# Patient Record
Sex: MALE | Race: WHITE | NOT HISPANIC OR LATINO | Employment: OTHER | URBAN - METROPOLITAN AREA
[De-identification: names, ages, dates, MRNs, and addresses within clinical notes are randomized per-mention and may not be internally consistent; named-entity substitution may affect disease eponyms.]

---

## 2017-04-24 ENCOUNTER — GENERIC CONVERSION - ENCOUNTER (OUTPATIENT)
Dept: OTHER | Facility: OTHER | Age: 82
End: 2017-04-24

## 2017-05-26 ENCOUNTER — GENERIC CONVERSION - ENCOUNTER (OUTPATIENT)
Dept: OTHER | Facility: OTHER | Age: 82
End: 2017-05-26

## 2017-06-29 ENCOUNTER — GENERIC CONVERSION - ENCOUNTER (OUTPATIENT)
Dept: OTHER | Facility: OTHER | Age: 82
End: 2017-06-29

## 2017-07-20 ENCOUNTER — ALLSCRIPTS OFFICE VISIT (OUTPATIENT)
Dept: OTHER | Facility: OTHER | Age: 82
End: 2017-07-20

## 2017-08-29 ENCOUNTER — GENERIC CONVERSION - ENCOUNTER (OUTPATIENT)
Dept: OTHER | Facility: OTHER | Age: 82
End: 2017-08-29

## 2017-12-29 ENCOUNTER — GENERIC CONVERSION - ENCOUNTER (OUTPATIENT)
Dept: FAMILY MEDICINE CLINIC | Facility: CLINIC | Age: 82
End: 2017-12-29

## 2018-01-13 VITALS
HEART RATE: 60 BPM | WEIGHT: 184 LBS | SYSTOLIC BLOOD PRESSURE: 140 MMHG | BODY MASS INDEX: 29.57 KG/M2 | DIASTOLIC BLOOD PRESSURE: 80 MMHG | HEIGHT: 66 IN

## 2018-02-01 DIAGNOSIS — I25.10 DISEASE OF CARDIOVASCULAR SYSTEM: ICD-10-CM

## 2018-02-01 DIAGNOSIS — I42.9 CARDIOMYOPATHY, UNSPECIFIED TYPE (HCC): Primary | ICD-10-CM

## 2018-04-20 DIAGNOSIS — Z95.0 PACEMAKER: Primary | ICD-10-CM

## 2018-05-31 DIAGNOSIS — Z13.89 SCREENING FOR SKIN CONDITION: Primary | ICD-10-CM

## 2018-07-11 ENCOUNTER — OFFICE VISIT (OUTPATIENT)
Dept: FAMILY MEDICINE CLINIC | Facility: CLINIC | Age: 83
End: 2018-07-11
Payer: COMMERCIAL

## 2018-07-11 VITALS
HEIGHT: 66 IN | WEIGHT: 184 LBS | HEART RATE: 60 BPM | SYSTOLIC BLOOD PRESSURE: 138 MMHG | BODY MASS INDEX: 29.57 KG/M2 | RESPIRATION RATE: 14 BRPM | DIASTOLIC BLOOD PRESSURE: 78 MMHG

## 2018-07-11 DIAGNOSIS — I10 ESSENTIAL HYPERTENSION: ICD-10-CM

## 2018-07-11 DIAGNOSIS — T14.8XXA HEMATOMA: Primary | ICD-10-CM

## 2018-07-11 DIAGNOSIS — H60.541 ECZEMA OF RIGHT EXTERNAL EAR: ICD-10-CM

## 2018-07-11 PROBLEM — H61.23 BILATERAL IMPACTED CERUMEN: Status: ACTIVE | Noted: 2017-07-20

## 2018-07-11 PROBLEM — I44.30 AV BLOCK: Status: ACTIVE | Noted: 2017-10-04

## 2018-07-11 PROBLEM — R31.29 OTHER MICROSCOPIC HEMATURIA: Status: ACTIVE | Noted: 2017-08-23

## 2018-07-11 PROCEDURE — 99214 OFFICE O/P EST MOD 30 MIN: CPT | Performed by: FAMILY MEDICINE

## 2018-07-11 PROCEDURE — 3078F DIAST BP <80 MM HG: CPT | Performed by: FAMILY MEDICINE

## 2018-07-11 PROCEDURE — 3075F SYST BP GE 130 - 139MM HG: CPT | Performed by: FAMILY MEDICINE

## 2018-07-11 RX ORDER — DOXAZOSIN 8 MG/1
1 TABLET ORAL DAILY
COMMUNITY

## 2018-07-11 RX ORDER — METOPROLOL SUCCINATE 50 MG/1
2 TABLET, EXTENDED RELEASE ORAL DAILY
COMMUNITY

## 2018-07-11 RX ORDER — DABIGATRAN ETEXILATE 150 MG/1
2 CAPSULE, COATED PELLETS ORAL DAILY
COMMUNITY
End: 2019-12-12 | Stop reason: ALTCHOICE

## 2018-07-11 RX ORDER — LOSARTAN POTASSIUM 50 MG/1
1 TABLET ORAL DAILY
COMMUNITY

## 2018-07-11 RX ORDER — SIMVASTATIN 20 MG
TABLET ORAL
COMMUNITY

## 2018-07-11 NOTE — PROGRESS NOTES
Assessment/Plan:    Diagnoses and all orders for this visit:    Hematoma    Eczema of right external ear  -     neomycin-polymyxin-hydrocortisone (CORTISPORIN) otic solution; 2 drops in R ear once or twice a day as needed    Essential hypertension  No treatment for hematoma  Will CT area if worsens or becomes symptomatic  Cortisporin otic for ear  Continue other meds other conditions are stable            Subjective:     Chief Complaint   Patient presents with    Mass     patient states that there is a lump on his head for past 1 month  states he did hit is head in that area prior to the lump  Patient ID: Leonel Clifton is a 80 y o  male  Patient hit head on underside of Deck   By accident  Had a lump that formed  This was 6 weeks ago and still has lump would like looked at  Also complaining of right ear itching he  He was on medicine in the past but does not remember what it is for this  Otherwise all other conditions are stable he has no other acute complaints today        The following portions of the patient's history were reviewed and updated as appropriate: allergies, current medications, past family history, past medical history, past social history, past surgical history and problem list     Review of Systems   Constitutional: Negative  HENT: Negative  Eyes: Negative  Respiratory: Negative  Cardiovascular: Negative  Gastrointestinal: Negative  Endocrine: Negative  Genitourinary: Negative  Musculoskeletal: Negative  Skin: Negative  Allergic/Immunologic: Negative  Neurological: Negative  Hematological: Negative  Psychiatric/Behavioral: Negative  All other systems reviewed and are negative          Objective:    Vitals:    07/11/18 1527   BP: 138/78   BP Location: Left arm   Patient Position: Sitting   Cuff Size: Standard   Pulse: 60   Resp: 14   Weight: 83 5 kg (184 lb)   Height: 5' 6" (1 676 m)          Physical Exam   Constitutional: He is oriented to person, place, and time  He appears well-developed and well-nourished  No distress  HENT:   Head: Normocephalic  Right Ear: External ear normal    Left Ear: External ear normal    Nose: Nose normal    Mouth/Throat: Oropharynx is clear and moist    Kirksey sized hematoma on top of scalp   Eyes: Conjunctivae and EOM are normal  Pupils are equal, round, and reactive to light  Right eye exhibits no discharge  Left eye exhibits no discharge  Neck: Normal range of motion  Cardiovascular: Normal rate, regular rhythm and normal heart sounds  Pulmonary/Chest: Effort normal and breath sounds normal    Abdominal: Soft  Bowel sounds are normal  He exhibits no distension  There is no tenderness  Musculoskeletal: Normal range of motion  Neurological: He is alert and oriented to person, place, and time  No cranial nerve deficit  Skin: Skin is warm and dry  No rash noted  Psychiatric: He has a normal mood and affect  His behavior is normal  Judgment and thought content normal    Nursing note and vitals reviewed

## 2018-08-23 DIAGNOSIS — N40.0 BENIGN PROSTATIC HYPERPLASIA, UNSPECIFIED WHETHER LOWER URINARY TRACT SYMPTOMS PRESENT: Primary | ICD-10-CM

## 2018-09-11 DIAGNOSIS — Z95.0 PACEMAKER: Primary | ICD-10-CM

## 2018-10-04 ENCOUNTER — OFFICE VISIT (OUTPATIENT)
Dept: FAMILY MEDICINE CLINIC | Facility: CLINIC | Age: 83
End: 2018-10-04
Payer: COMMERCIAL

## 2018-10-04 VITALS
HEIGHT: 66 IN | HEART RATE: 66 BPM | SYSTOLIC BLOOD PRESSURE: 112 MMHG | DIASTOLIC BLOOD PRESSURE: 62 MMHG | WEIGHT: 180.6 LBS | BODY MASS INDEX: 29.02 KG/M2

## 2018-10-04 DIAGNOSIS — H61.23 BILATERAL IMPACTED CERUMEN: Primary | ICD-10-CM

## 2018-10-04 DIAGNOSIS — H91.93 BILATERAL HEARING LOSS, UNSPECIFIED HEARING LOSS TYPE: ICD-10-CM

## 2018-10-04 PROCEDURE — 99213 OFFICE O/P EST LOW 20 MIN: CPT | Performed by: FAMILY MEDICINE

## 2018-10-04 PROCEDURE — 1036F TOBACCO NON-USER: CPT | Performed by: FAMILY MEDICINE

## 2018-10-04 PROCEDURE — 4040F PNEUMOC VAC/ADMIN/RCVD: CPT | Performed by: FAMILY MEDICINE

## 2018-10-04 PROCEDURE — 69209 REMOVE IMPACTED EAR WAX UNI: CPT | Performed by: FAMILY MEDICINE

## 2018-10-04 PROCEDURE — 1160F RVW MEDS BY RX/DR IN RCRD: CPT | Performed by: FAMILY MEDICINE

## 2018-10-04 NOTE — PROGRESS NOTES
Assessment/Plan:     Diagnoses and all orders for this visit:    Bilateral impacted cerumen  -     Ear cerumen removal    Bilateral hearing loss, unspecified hearing loss type      cerumen removed with irrigation as well as instrumentation bilaterally  No complications  See procedure note for any further questions      Subjective:     Chief Complaint   Patient presents with    Cerumen Impaction        Patient ID: Augustine Hull is a 80 y o  male  Patient here for bilateral cerumen impactions  He was getting hearing aids and realized that his ears are full of wax and impeding fitting of his hearing aids   He states he is having hard time hearing with or without his hearing aids as well        The following portions of the patient's history were reviewed and updated as appropriate: allergies, current medications, past family history, past medical history, past social history, past surgical history and problem list     Review of Systems   Constitutional: Negative  HENT: Negative  Eyes: Negative  Respiratory: Negative  Cardiovascular: Negative  Gastrointestinal: Negative  Endocrine: Negative  Genitourinary: Negative  Musculoskeletal: Negative  Skin: Negative  Allergic/Immunologic: Negative  Neurological: Negative  Hematological: Negative  Psychiatric/Behavioral: Negative  All other systems reviewed and are negative  Objective:    Vitals:    10/04/18 1341   BP: 112/62   BP Location: Left arm   Patient Position: Sitting   Cuff Size: Standard   Pulse: 66   Weight: 81 9 kg (180 lb 9 6 oz)   Height: 5' 6" (1 676 m)          Physical Exam   Constitutional: He is oriented to person, place, and time  He appears well-developed and well-nourished  No distress  HENT:   Head: Normocephalic     Right Ear: External ear normal    Left Ear: External ear normal    Nose: Nose normal    Mouth/Throat: Oropharynx is clear and moist    Eyes: Pupils are equal, round, and reactive to light  Conjunctivae and EOM are normal  Right eye exhibits no discharge  Left eye exhibits no discharge  Neck: Normal range of motion  Cardiovascular: Normal rate, regular rhythm and normal heart sounds  Pulmonary/Chest: Effort normal and breath sounds normal    Abdominal: Soft  Bowel sounds are normal  He exhibits no distension  There is no tenderness  Musculoskeletal: Normal range of motion  Neurological: He is alert and oriented to person, place, and time  No cranial nerve deficit  Skin: Skin is warm and dry  No rash noted  Psychiatric: He has a normal mood and affect  His behavior is normal  Judgment and thought content normal    Nursing note and vitals reviewed  Ear cerumen removal  Date/Time: 10/4/2018 5:06 PM  Performed by: Miguel Guerrero by: Gisella Ingram     Patient location:  Clinic  Other Assisting Provider: No    Consent:     Consent obtained:  Verbal    Risks discussed:  Incomplete removal, TM perforation, pain and dizziness    Alternatives discussed:  No treatment  Universal protocol:     Procedure explained and questions answered to patient or proxy's satisfaction: yes      Relevant documents present and verified: yes      Test results available and properly labeled: yes      Radiology Images displayed and confirmed  If images not available, report reviewed: yes      Required blood products, implants, devices and special equipment available: yes      Site/side marked: yes      Immediately prior to procedure a time out was called: yes      Patient identity confirmed:  Verbally with patient  Procedure details:     Location:  L ear and R ear    Procedure type: irrigation      Approach:  External  Post-procedure details:     Complication:  None    Patient tolerance of procedure:   Tolerated well, no immediate complications  Comments:      Here flushed with irrigation as well as alligator forceps

## 2019-02-18 DIAGNOSIS — H35.3132 INTERMEDIATE STAGE NONEXUDATIVE AGE-RELATED MACULAR DEGENERATION OF BOTH EYES: ICD-10-CM

## 2019-02-18 DIAGNOSIS — H43.811 POSTERIOR VITREOUS DETACHMENT OF RIGHT EYE: ICD-10-CM

## 2019-02-18 DIAGNOSIS — H35.63 RETINAL HEMORRHAGE OF BOTH EYES: Primary | ICD-10-CM

## 2019-03-26 DIAGNOSIS — Z95.0 PACEMAKER: Primary | ICD-10-CM

## 2019-05-13 DIAGNOSIS — Z13.89 SKIN CONDITION SCREENING: Primary | ICD-10-CM

## 2019-08-22 ENCOUNTER — TELEPHONE (OUTPATIENT)
Dept: FAMILY MEDICINE CLINIC | Facility: CLINIC | Age: 84
End: 2019-08-22

## 2019-08-22 NOTE — TELEPHONE ENCOUNTER
REFERRAL  Facility: [facility]  Doctor: Dr Miki Stallings  Phone #:[phone]  NPI: [id number]  [address] [place] [address] Code: [id number], [id number]  Procedure Code:  Appt Date: 08/22/19  Insurance: [address] [place] [address]

## 2019-08-23 DIAGNOSIS — H35.3131 EARLY STAGE NONEXUDATIVE AGE-RELATED MACULAR DEGENERATION OF BOTH EYES: Primary | ICD-10-CM

## 2019-08-23 DIAGNOSIS — H43.813 POSTERIOR VITREOUS DETACHMENT OF BOTH EYES: ICD-10-CM

## 2019-09-03 DIAGNOSIS — N40.1 BENIGN PROSTATIC HYPERPLASIA WITH LOWER URINARY TRACT SYMPTOMS, SYMPTOM DETAILS UNSPECIFIED: Primary | ICD-10-CM

## 2019-09-12 ENCOUNTER — TELEPHONE (OUTPATIENT)
Dept: FAMILY MEDICINE CLINIC | Facility: CLINIC | Age: 84
End: 2019-09-12

## 2019-09-12 DIAGNOSIS — Z95.0 CARDIAC PACEMAKER IN SITU: Primary | ICD-10-CM

## 2019-09-12 NOTE — TELEPHONE ENCOUNTER
Jennifer Rosas  Doctor:  [facility]   Phone #:[phone]  NYF:[id number]  DX Code:Z 95 0   Procedure Code:CONSULT AND TREAT   Appt Date: 09/17/19  Insurance: Luis E Medina

## 2019-09-16 ENCOUNTER — TELEPHONE (OUTPATIENT)
Dept: FAMILY MEDICINE CLINIC | Facility: CLINIC | Age: 84
End: 2019-09-16

## 2019-09-16 NOTE — TELEPHONE ENCOUNTER
REFERRAL  Facility: [facility]  Doctor:   Phone #:[phone]  NPI: [id number]  DX Code:Z95 0  Procedure Code:  Appt Date: 09/17/19  Insurance: [employer]

## 2019-11-05 ENCOUNTER — TELEPHONE (OUTPATIENT)
Dept: FAMILY MEDICINE CLINIC | Facility: CLINIC | Age: 84
End: 2019-11-05

## 2019-11-08 ENCOUNTER — TELEPHONE (OUTPATIENT)
Dept: FAMILY MEDICINE CLINIC | Facility: CLINIC | Age: 84
End: 2019-11-08

## 2019-11-08 NOTE — TELEPHONE ENCOUNTER
REFERRAL  Facility: [facility]  Doctor:  Dr Demetrius Fisher  Phone #:[phone]  DG  DX Code:Z95 0  Procedure Code:  Appt Date:19, 19  Insurance: Hilaria Nichole [facility]    Needs an extra referral

## 2019-11-13 ENCOUNTER — TELEPHONE (OUTPATIENT)
Dept: FAMILY MEDICINE CLINIC | Facility: CLINIC | Age: 84
End: 2019-11-13

## 2019-11-13 NOTE — TELEPHONE ENCOUNTER
REFERRAL  [facility]   Doctor: Flower Handley  Phone #:[phone]  OQU:[id number]  DX Code:Z 13 89  Procedure Code:CONSULT AND TREAT   Appt Date: 12/04/19  Insurance: Roy Aschoff

## 2019-11-16 DIAGNOSIS — Z13.89 SCREENING FOR SKIN CONDITION: Primary | ICD-10-CM

## 2019-12-04 ENCOUNTER — TELEPHONE (OUTPATIENT)
Dept: FAMILY MEDICINE CLINIC | Facility: CLINIC | Age: 84
End: 2019-12-04

## 2019-12-04 NOTE — TELEPHONE ENCOUNTER
REFERRAL  Facility:[facility]   Doctor:   Phone #:  [phone]  DX Code:ECHO   Procedure [id number]  Appt Date: 12/23/19  Insurance: Terri Martinez

## 2019-12-09 ENCOUNTER — DOCUMENTATION (OUTPATIENT)
Dept: FAMILY MEDICINE CLINIC | Facility: CLINIC | Age: 84
End: 2019-12-09

## 2019-12-09 NOTE — PROGRESS NOTES
[employer] referral done for [facility] for ECHO--ordered by Dr Omayra Naqvi   Referral #[id number]    trb

## 2019-12-12 ENCOUNTER — OFFICE VISIT (OUTPATIENT)
Dept: FAMILY MEDICINE CLINIC | Facility: CLINIC | Age: 84
End: 2019-12-12
Payer: COMMERCIAL

## 2019-12-12 VITALS
TEMPERATURE: 97.4 F | HEART RATE: 67 BPM | WEIGHT: 181.2 LBS | DIASTOLIC BLOOD PRESSURE: 82 MMHG | HEIGHT: 67 IN | SYSTOLIC BLOOD PRESSURE: 140 MMHG | BODY MASS INDEX: 28.44 KG/M2 | OXYGEN SATURATION: 97 %

## 2019-12-12 DIAGNOSIS — Z00.00 MEDICARE ANNUAL WELLNESS VISIT, INITIAL: Primary | ICD-10-CM

## 2019-12-12 DIAGNOSIS — I48.11 LONGSTANDING PERSISTENT ATRIAL FIBRILLATION (HCC): ICD-10-CM

## 2019-12-12 DIAGNOSIS — I42.9 CARDIOMYOPATHY, UNSPECIFIED TYPE (HCC): ICD-10-CM

## 2019-12-12 PROCEDURE — 3725F SCREEN DEPRESSION PERFORMED: CPT | Performed by: FAMILY MEDICINE

## 2019-12-12 PROCEDURE — 1125F AMNT PAIN NOTED PAIN PRSNT: CPT | Performed by: FAMILY MEDICINE

## 2019-12-12 PROCEDURE — G0438 PPPS, INITIAL VISIT: HCPCS | Performed by: FAMILY MEDICINE

## 2019-12-12 PROCEDURE — 1170F FXNL STATUS ASSESSED: CPT | Performed by: FAMILY MEDICINE

## 2019-12-12 PROCEDURE — 1160F RVW MEDS BY RX/DR IN RCRD: CPT | Performed by: FAMILY MEDICINE

## 2019-12-12 PROCEDURE — 1036F TOBACCO NON-USER: CPT | Performed by: FAMILY MEDICINE

## 2019-12-12 NOTE — PATIENT INSTRUCTIONS

## 2019-12-12 NOTE — PROGRESS NOTES
Assessment/Plan:       Diagnoses and all orders for this visit:    Medicare annual wellness visit, initial    Cardiomyopathy, unspecified type [facility])  Patient follows with Cardiology   this condition is stable    Longstanding persistent atrial fibrillation  Patient follows with Cardiology  His condition is stable  Other orders  -     apixaban (ELIQUIS) 5 mg; Take 5 mg by mouth 2 (two) times a day      Medicare wellness visit completed  He has no acute complaints today  He will continue his current medications and follow up with his cardiologist  He can follow up with me in 1 year or sooner if needed      Subjective:     Chief Complaint   Patient presents with   [facility] Wellness Visit     Medicare Wellness Exam 80year old        Patient ID: Bridget Sellers is a 80 y o  male  Patient is here for Medicare wellness visit  He has no acute complaints today  We did review his interval history and is insignificant  He is doing well with no major complaints today      The following portions of the patient's history were reviewed and updated as appropriate: allergies, current medications, past family history, past medical history, past social history, past surgical history and problem list     Review of Systems   Constitutional: Negative  HENT: Negative  Eyes: Negative  Respiratory: Negative  Cardiovascular: Negative  Gastrointestinal: Negative  Endocrine: Negative  Genitourinary: Negative  Musculoskeletal: Negative  Skin: Negative  Allergic/Immunologic: Negative  Neurological: Negative  Hematological: Negative  Psychiatric/Behavioral: Negative  All other systems reviewed and are negative          Objective:    Vitals:    12/12/19 1604   BP: 140/82   BP Location: Left arm   Patient Position: Sitting   Cuff Size: Standard   Pulse: 67   Temp: (!) 97 4 °F (36 3 °C)   TempSrc: Tympanic   SpO2: 97%   Weight: 82 2 kg (181 lb 3 2 oz)   Height: 5' 7" (1 702 m)          Physical Exam Constitutional: He is oriented to person, place, and time  He appears well-developed and well-nourished  No distress  HENT:   Head: Normocephalic  Right Ear: External ear normal    Left Ear: External ear normal    Nose: Nose normal    Mouth/Throat: Oropharynx is clear and moist    Eyes: Pupils are equal, round, and reactive to light  Conjunctivae and EOM are normal  Right eye exhibits no discharge  Left eye exhibits no discharge  Neck: Normal range of motion  Cardiovascular: Normal rate, regular rhythm and normal heart sounds  Pulmonary/Chest: Effort normal and breath sounds normal    Abdominal: Soft  Bowel sounds are normal  He exhibits no distension  There is no tenderness  Musculoskeletal: Normal range of motion  Neurological: He is alert and oriented to person, place, and time  No cranial nerve deficit  Skin: Skin is warm and dry  No rash noted  Psychiatric: He has a normal mood and affect  His behavior is normal  Judgment and thought content normal    Nursing note and vitals reviewed  Assessment and Plan:     Problem List Items Addressed This Visit        Cardiovascular and Mediastinum    Atrial fibrillation ([facility] [address] )    Cardiomyopathy ([facility] )      Other Visit Diagnoses     Medicare annual wellness visit, initial    -  Primary        BMI Counseling: Body mass index is 28 38 kg/m²  The BMI is above normal  Nutrition recommendations include decreasing portion sizes, encouraging healthy choices of fruits and vegetables, decreasing fast food intake, consuming healthier snacks, limiting drinks that contain sugar, moderation in carbohydrate intake, increasing intake of lean protein, reducing intake of saturated and trans fat and reducing intake of cholesterol  Exercise recommendations include exercising 3-5 times per week  Preventive health issues were discussed with patient, and age appropriate screening tests were ordered as noted in patient's After Visit Summary    [facility] health advice and appropriate referrals for health education or preventive services given if needed, as noted in patient's After Visit Summary  History of Present Illness:     Patient presents for Medicare Annual Wellness visit    Patient Care Team:  Waqas Ely DO as PCP - General     Problem List:     Patient Active Problem List   Diagnosis    Age-related macular degeneration    Aortic regurgitation    Atrial fibrillation (HCC)    AV block    Benign prostatic hyperplasia    Bilateral dry eyes    Bilateral impacted cerumen    Carcinoma in situ of skin    Cardiomyopathy ([address] )    Cataract    Disorder of skin    Hearing loss    Hyperlipidemia    Hypertension    Mitral prolapse    Other microscopic hematuria    Hematoma      Past Medical and Surgical History:     Past Medical History:   Diagnosis Date    Cataract     resolved: 10/19/15    Other microscopic hematuria     resolved: 1/5/18     Past Surgical History:   Procedure Laterality Date    CARDIAC PACEMAKER PLACEMENT      Dual-Chamber    HERNIA REPAIR      KNEE ARTHROSCOPY        Family History:     Family History   Problem Relation Age of Onset    No Known Problems Mother     No Known Problems Father       Social History:     Social History     Socioeconomic History    Marital status:       Spouse name: None    Number of children: None    Years of education: None    Highest education level: None   Occupational History    None   Social Needs    Financial resource strain: None    Food insecurity:     Worry: None     Inability: None    Transportation needs:     Medical: None     Non-medical: None   Tobacco Use    Smoking status: Never Smoker    Smokeless tobacco: Never Used   Substance and Sexual Activity    Alcohol use: Yes     Comment: social    Drug use: None    Sexual activity: None   Lifestyle    Physical activity:     Days per week: None     Minutes per session: None    Stress: None   Relationships    Social connections:     Talks on phone: None     Gets together: None     Attends Qu[employer] service: None     Active member of club or organization: None     Attends meetings of clubs or organizations: None     Relationship status: None    Intimate partner violence:     Fear of current or ex partner: None     Emotionally abused: None     Physically abused: None     Forced sexual activity: None   Other Topics Concern    None   Social History Narrative    Exercising regularly    Working full time       Medications and Allergies:     Current Outpatient Medications   Medication Sig Dispense Refill    apixaban (ELIQUIS) 5 mg Take 5 mg by mouth 2 (two) times a day      doxazosin (CARDURA) 8 MG tablet Take 1 tablet by mouth daily      losartan (COZAAR) 50 mg tablet Take 1 tablet by mouth daily      metoprolol succinate (TOPROL-XL) 50 mg 24 hr tablet Take 2 tablets by mouth daily      simvastatin (ZOCOR) 20 mg tablet Take by mouth daily       No current facility-administered medications for this visit  No Known Allergies   Immunizations:     Immunization History   Administered Date(s) Administered    INFLUENZA 11/13/2012, 08/17/2015, 11/02/2016, 11/03/2017    Influenza Split High Dose Preservative Free IM 11/02/2016    Influenza TIV (IM) 10/24/2013, 08/17/2015    Pneumococcal Polysaccharide PPV23 10/18/2003, 05/06/2011    influenza, trivalent, adjuvanted 08/29/2019      Health Maintenance: There are no preventive care reminders to display for this patient  There are no preventive care reminders to display for this patient  Medicare Health Risk Assessment:     /82 (BP Location: Left arm, Patient Position: Sitting, Cuff Size: Standard)   Pulse 67   Temp (!) 97 4 °F (36 3 °C) (Tympanic)   Ht 5' 7" (1 702 m)   Wt 82 2 kg (181 lb 3 2 oz)   SpO2 97%   BMI 28 38 kg/m²      Morgan Whiteside is here for his Subsequent Wellness visit   Last Medicare Wellness visit information reviewed, patient interviewed and updates made to the record today  Health Risk Assessment:   Patient rates overall health as good  Patient feels that their physical health rating is same  Eyesight was rated as same  Hearing was rated as same  Patient feels that their emotional and mental health rating is same  Pain experienced in the last 7 days has been none  Patient states that he has experienced no weight loss or gain in last 6 months  Depression Screening:   PHQ-2 Score: 0      Fall Risk Screening: In the past year, patient has experienced: no history of falling in past year      Home Safety:  Patient does not have trouble with stairs inside or outside of their home  Patient has working smoke alarms and has working carbon monoxide detector  Home safety hazards include: none  Nutrition:   Current diet is Regular  Medications:   Patient is currently taking over-the-counter supplements  OTC medications include: see medication list  Patient is able to manage medications  Activities of Daily Living (ADLs)/Instrumental Activities of Daily Living (IADLs):   Walk and transfer into and out of bed and chair?: Yes  Dress and groom yourself?: Yes    Bathe or shower yourself?: Yes    Feed yourself? Yes  Do your laundry/housekeeping?: Yes  Manage your money, pay your bills and track your expenses?: Yes  Make your own meals?: Yes    Do your own shopping?: Yes    ADL comments: Still works full time     Previous Hospitalizations:   Any hospitalizations or ED visits within the last 12 months?: Yes      Advance Care Planning:   Living will: Yes    Durable POA for healthcare:  Yes    Advanced directive: Yes    Advanced directive counseling given: Yes    Five wishes given: No    End of Life Decisions reviewed with patient: Yes    Provider agrees with end of life decisions: Yes      Cognitive Screening:   Provider or family/friend/caregiver concerned regarding cognition?: No    PREVENTIVE SCREENINGS      Cardiovascular Screening:    General: History Lipid Disorder and Screening Current      Diabetes Screening:     General: Screening Current      Colorectal Cancer Screening:     General: Screening Not Indicated      Prostate Cancer Screening:    General: Screening Not Indicated      Osteoporosis Screening:    General: Screening Not Indicated      Abdominal Aortic Aneurysm (AAA) Screening:        General: Screening Not Indicated      Lung Cancer Screening:     General: Screening Not Indicated      Hepatitis C Screening:    General: Screening Not Indicated    Other Counseling Topics:   Alcohol use counseling, car/seat belt/driving safety, skin self-exam, sunscreen and regular weightbearing exercise and calcium and vitamin D intake         Weston Page, DO

## 2019-12-23 ENCOUNTER — TELEPHONE (OUTPATIENT)
Dept: FAMILY MEDICINE CLINIC | Facility: CLINIC | Age: 84
End: 2019-12-23

## 2019-12-23 DIAGNOSIS — I34.8 MITRAL VALVE FILAMENTOUS STRANDS: Primary | ICD-10-CM

## 2019-12-23 DIAGNOSIS — I10 ESSENTIAL HYPERTENSION, MALIGNANT: ICD-10-CM

## 2019-12-23 NOTE — TELEPHONE ENCOUNTER
Iraida Collado from [facility] called  She asked if we could call with the referral number  They are having computer trouble    [phone] [id number]

## 2019-12-23 NOTE — TELEPHONE ENCOUNTER
REFERRAL  Facility:[facility]  Doctor: DR Valarie Pearl  Phone #:  IMN:[id number]  DX Code: A FIB  Procedure [id number]  ECHO  Appt Date: 12/23/19  Insurance: [id number]  Fabiola Rogers

## 2019-12-23 NOTE — TELEPHONE ENCOUNTER
REFERRAL  Facility:[facility] for [facility]  Doctor:   Phone #:[phone]  IXK:[id number]  DX Code:I34 8 & I10  Procedure ZDES:[id number]  Appt Date: 12/23/19  Insurance: Makayla Bills

## 2020-03-20 ENCOUNTER — OFFICE VISIT (OUTPATIENT)
Dept: FAMILY MEDICINE CLINIC | Facility: CLINIC | Age: 85
End: 2020-03-20
Payer: COMMERCIAL

## 2020-03-20 VITALS
DIASTOLIC BLOOD PRESSURE: 84 MMHG | SYSTOLIC BLOOD PRESSURE: 136 MMHG | HEART RATE: 61 BPM | BODY MASS INDEX: 28.97 KG/M2 | OXYGEN SATURATION: 96 % | HEIGHT: 67 IN | TEMPERATURE: 96.4 F | WEIGHT: 184.6 LBS

## 2020-03-20 DIAGNOSIS — I42.9 CARDIOMYOPATHY, UNSPECIFIED TYPE (HCC): ICD-10-CM

## 2020-03-20 DIAGNOSIS — S81.809A NON-HEALING WOUND OF LOWER EXTREMITY, INITIAL ENCOUNTER: Primary | ICD-10-CM

## 2020-03-20 DIAGNOSIS — L29.9 PRURITUS: ICD-10-CM

## 2020-03-20 DIAGNOSIS — B86 SCABIES: ICD-10-CM

## 2020-03-20 DIAGNOSIS — I48.91 ATRIAL FIBRILLATION, UNSPECIFIED TYPE (HCC): ICD-10-CM

## 2020-03-20 PROCEDURE — 3008F BODY MASS INDEX DOCD: CPT | Performed by: FAMILY MEDICINE

## 2020-03-20 PROCEDURE — 99214 OFFICE O/P EST MOD 30 MIN: CPT | Performed by: FAMILY MEDICINE

## 2020-03-20 PROCEDURE — 1160F RVW MEDS BY RX/DR IN RCRD: CPT | Performed by: FAMILY MEDICINE

## 2020-03-20 PROCEDURE — 3288F FALL RISK ASSESSMENT DOCD: CPT | Performed by: FAMILY MEDICINE

## 2020-03-20 PROCEDURE — 3079F DIAST BP 80-89 MM HG: CPT | Performed by: FAMILY MEDICINE

## 2020-03-20 PROCEDURE — 4040F PNEUMOC VAC/ADMIN/RCVD: CPT | Performed by: FAMILY MEDICINE

## 2020-03-20 PROCEDURE — 1101F PT FALLS ASSESS-DOCD LE1/YR: CPT | Performed by: FAMILY MEDICINE

## 2020-03-20 PROCEDURE — 1036F TOBACCO NON-USER: CPT | Performed by: FAMILY MEDICINE

## 2020-03-20 PROCEDURE — 3075F SYST BP GE 130 - 139MM HG: CPT | Performed by: FAMILY MEDICINE

## 2020-03-20 RX ORDER — METHYLPREDNISOLONE 4 MG/1
TABLET ORAL
Qty: 21 EACH | Refills: 0 | Status: SHIPPED | OUTPATIENT
Start: 2020-03-20 | End: 2020-06-08 | Stop reason: ALTCHOICE

## 2020-03-20 RX ORDER — PERMETHRIN 50 MG/G
CREAM TOPICAL ONCE
Qty: 60 G | Refills: 0 | Status: SHIPPED | OUTPATIENT
Start: 2020-03-20 | End: 2020-03-20

## 2020-03-20 NOTE — PROGRESS NOTES
Assessment/Plan:     Diagnoses and all orders for this visit:    Non-healing wound of lower extremity, initial encounter  -     Ambulatory referral to Wound Care; Future    Cardiomyopathy, unspecified type [facility])  This is currently stable  Patient follows with Cardiology    Atrial fibrillation, unspecified type [facility])  This is currently stable  Patient follows with Cardiology  Scabies  -     permethrin (ELIMITE) 5 % cream; Apply topically once for 1 dose    Pruritus  -     methylPREDNISolone 4 MG tablet therapy pack; Use as directed on package      will refer to wound care for leg wound that is not healing  Is not currently open but is not fully resolved  Otherwise as far as his itchy rash goes is been off and on for weeks  It is worsened shower with hot water  The rash is consistent with scabies type outbreak  Will treat with permethrin as well as a prednisone pack  Follow-up as needed with this      Subjective:     Chief Complaint   Patient presents with    Skin Ulcer        Patient ID: Anupama Hernandez is a 80 y o  male  Patient is here for multiple complaints  His biggest complaint is a wound on his right anterior shin  He banged against a piece of furniture over a month ago  The wound is still present is sore but is not losing or appearing infected at this time is simply not healing  He also has a rash on his arms torso stomach for the past month that is extremely itchy and extremely faint      The following portions of the patient's history were reviewed and updated as appropriate: allergies, current medications, past family history, past medical history, past social history, past surgical history and problem list     Review of Systems   Constitutional: Negative  HENT: Negative  Eyes: Negative  Respiratory: Negative  Cardiovascular: Negative  Gastrointestinal: Negative  Endocrine: Negative  Genitourinary: Negative  Musculoskeletal: Negative  Skin: Positive for rash and wound  Allergic/Immunologic: Negative  Neurological: Negative  Hematological: Negative  Psychiatric/Behavioral: Negative  All other systems reviewed and are negative  Objective:    Vitals:    03/20/20 1447   BP: 136/84   BP Location: Left arm   Patient Position: Sitting   Cuff Size: Standard   Pulse: 61   Temp: (!) 96 4 °F (35 8 °C)   TempSrc: Tympanic   SpO2: 96%   Weight: 83 7 kg (184 lb 9 6 oz)   Height: 5' 7" (1 702 m)          Physical Exam   Constitutional: He is oriented to person, place, and time  He appears well-developed and well-nourished  No distress  HENT:   Head: Normocephalic  Right Ear: External ear normal    Left Ear: External ear normal    Nose: Nose normal    Mouth/Throat: Oropharynx is clear and moist    Eyes: Pupils are equal, round, and reactive to light  Conjunctivae and EOM are normal  Right eye exhibits no discharge  Left eye exhibits no discharge  Neck: Normal range of motion  Cardiovascular: Normal rate, regular rhythm and normal heart sounds  Pulmonary/Chest: Effort normal and breath sounds normal    Abdominal: Soft  Bowel sounds are normal  He exhibits no distension  There is no tenderness  Musculoskeletal: Normal range of motion  Neurological: He is alert and oriented to person, place, and time  No cranial nerve deficit  Skin: Skin is warm and dry  No rash noted  Small macular lesions throughout body  That her extremely itchy  Patient also has a half-dollar size wound on his anterior right shin that is not currently open but not healing   Psychiatric: He has a normal mood and affect  His behavior is normal  Judgment and thought content normal    Nursing note and vitals reviewed

## 2020-03-23 ENCOUNTER — TELEPHONE (OUTPATIENT)
Dept: FAMILY MEDICINE CLINIC | Facility: CLINIC | Age: 85
End: 2020-03-23

## 2020-03-23 NOTE — TELEPHONE ENCOUNTER
REFERRAL  Facility:[facility]  Doctor: Dr Beverly Henriquez  Phone #:[phone]  NPI for facility: [id number], NPI for doctor: [id number]  DX Code:S81 [id number]  Procedure Code:  Appt Date: 03/25/2020  Insurance: Cat Gann [address] [place] [address]

## 2020-03-24 DIAGNOSIS — S81.009D OPEN WOUND OF KNEE, LEG, AND ANKLE, COMPLICATED, UNSPECIFIED LATERALITY, SUBSEQUENT ENCOUNTER: Primary | ICD-10-CM

## 2020-03-24 DIAGNOSIS — S91.009D OPEN WOUND OF KNEE, LEG, AND ANKLE, COMPLICATED, UNSPECIFIED LATERALITY, SUBSEQUENT ENCOUNTER: Primary | ICD-10-CM

## 2020-03-24 DIAGNOSIS — S81.809D OPEN WOUND OF KNEE, LEG, AND ANKLE, COMPLICATED, UNSPECIFIED LATERALITY, SUBSEQUENT ENCOUNTER: Primary | ICD-10-CM

## 2020-03-25 ENCOUNTER — APPOINTMENT (OUTPATIENT)
Dept: WOUND CARE | Facility: HOSPITAL | Age: 85
End: 2020-03-25
Payer: COMMERCIAL

## 2020-03-25 PROCEDURE — 99212 OFFICE O/P EST SF 10 MIN: CPT

## 2020-04-08 ENCOUNTER — APPOINTMENT (OUTPATIENT)
Dept: WOUND CARE | Facility: HOSPITAL | Age: 85
End: 2020-04-08
Payer: COMMERCIAL

## 2020-04-08 PROCEDURE — 29580 STRAPPING UNNA BOOT: CPT

## 2020-04-09 ENCOUNTER — TELEPHONE (OUTPATIENT)
Dept: FAMILY MEDICINE CLINIC | Facility: CLINIC | Age: 85
End: 2020-04-09

## 2020-04-09 DIAGNOSIS — S81.809D OPEN WOUND OF KNEE, LEG, AND ANKLE, COMPLICATED, UNSPECIFIED LATERALITY, SUBSEQUENT ENCOUNTER: Primary | ICD-10-CM

## 2020-04-09 DIAGNOSIS — Z00.8 HEALTH EXAMINATION IN POPULATION SURVEY: Primary | ICD-10-CM

## 2020-04-09 DIAGNOSIS — S91.009D OPEN WOUND OF KNEE, LEG, AND ANKLE, COMPLICATED, UNSPECIFIED LATERALITY, SUBSEQUENT ENCOUNTER: Primary | ICD-10-CM

## 2020-04-09 DIAGNOSIS — S81.009D OPEN WOUND OF KNEE, LEG, AND ANKLE, COMPLICATED, UNSPECIFIED LATERALITY, SUBSEQUENT ENCOUNTER: Primary | ICD-10-CM

## 2020-04-15 ENCOUNTER — APPOINTMENT (OUTPATIENT)
Dept: WOUND CARE | Facility: HOSPITAL | Age: 85
End: 2020-04-15
Payer: COMMERCIAL

## 2020-04-15 PROCEDURE — 99213 OFFICE O/P EST LOW 20 MIN: CPT

## 2020-04-22 ENCOUNTER — APPOINTMENT (OUTPATIENT)
Dept: WOUND CARE | Facility: HOSPITAL | Age: 85
End: 2020-04-22
Payer: COMMERCIAL

## 2020-04-22 PROCEDURE — 99212 OFFICE O/P EST SF 10 MIN: CPT

## 2020-04-23 DIAGNOSIS — I47.2 PAROXYSMAL VENTRICULAR TACHYCARDIA (HCC): Primary | ICD-10-CM

## 2020-06-05 ENCOUNTER — OFFICE VISIT (OUTPATIENT)
Dept: URGENT CARE | Facility: CLINIC | Age: 85
End: 2020-06-05
Payer: COMMERCIAL

## 2020-06-05 VITALS
RESPIRATION RATE: 16 BRPM | SYSTOLIC BLOOD PRESSURE: 140 MMHG | HEART RATE: 70 BPM | DIASTOLIC BLOOD PRESSURE: 70 MMHG | BODY MASS INDEX: 27.47 KG/M2 | OXYGEN SATURATION: 97 % | WEIGHT: 175 LBS | HEIGHT: 67 IN | TEMPERATURE: 98.2 F

## 2020-06-05 DIAGNOSIS — L03.115 CELLULITIS OF RIGHT LOWER EXTREMITY: Primary | ICD-10-CM

## 2020-06-05 PROCEDURE — 99213 OFFICE O/P EST LOW 20 MIN: CPT | Performed by: PHYSICIAN ASSISTANT

## 2020-06-05 PROCEDURE — S9083 URGENT CARE CENTER GLOBAL: HCPCS | Performed by: PHYSICIAN ASSISTANT

## 2020-06-05 RX ORDER — SULFAMETHOXAZOLE AND TRIMETHOPRIM 800; 160 MG/1; MG/1
1 TABLET ORAL 2 TIMES DAILY
Qty: 14 TABLET | Refills: 0 | Status: SHIPPED | OUTPATIENT
Start: 2020-06-05 | End: 2020-06-08

## 2020-06-05 RX ORDER — SULFAMETHOXAZOLE AND TRIMETHOPRIM 800; 160 MG/1; MG/1
1 TABLET ORAL 2 TIMES DAILY
Qty: 14 TABLET | Refills: 0 | Status: SHIPPED | OUTPATIENT
Start: 2020-06-05 | End: 2020-06-05

## 2020-06-08 ENCOUNTER — OFFICE VISIT (OUTPATIENT)
Dept: FAMILY MEDICINE CLINIC | Facility: CLINIC | Age: 85
End: 2020-06-08
Payer: COMMERCIAL

## 2020-06-08 VITALS
TEMPERATURE: 97.4 F | HEART RATE: 63 BPM | DIASTOLIC BLOOD PRESSURE: 72 MMHG | SYSTOLIC BLOOD PRESSURE: 134 MMHG | OXYGEN SATURATION: 97 % | BODY MASS INDEX: 27.91 KG/M2 | HEIGHT: 67 IN | WEIGHT: 177.8 LBS

## 2020-06-08 DIAGNOSIS — L03.115 CELLULITIS OF RIGHT LOWER EXTREMITY: Primary | ICD-10-CM

## 2020-06-08 PROCEDURE — 3078F DIAST BP <80 MM HG: CPT | Performed by: FAMILY MEDICINE

## 2020-06-08 PROCEDURE — 4040F PNEUMOC VAC/ADMIN/RCVD: CPT | Performed by: FAMILY MEDICINE

## 2020-06-08 PROCEDURE — 1160F RVW MEDS BY RX/DR IN RCRD: CPT | Performed by: FAMILY MEDICINE

## 2020-06-08 PROCEDURE — 99213 OFFICE O/P EST LOW 20 MIN: CPT | Performed by: FAMILY MEDICINE

## 2020-06-08 PROCEDURE — 3075F SYST BP GE 130 - 139MM HG: CPT | Performed by: FAMILY MEDICINE

## 2020-06-08 PROCEDURE — 3008F BODY MASS INDEX DOCD: CPT | Performed by: FAMILY MEDICINE

## 2020-06-08 PROCEDURE — 1036F TOBACCO NON-USER: CPT | Performed by: FAMILY MEDICINE

## 2020-06-08 RX ORDER — CLINDAMYCIN HYDROCHLORIDE 300 MG/1
300 CAPSULE ORAL 3 TIMES DAILY
Qty: 21 CAPSULE | Refills: 0 | Status: SHIPPED | OUTPATIENT
Start: 2020-06-08 | End: 2020-06-15

## 2020-09-30 ENCOUNTER — OFFICE VISIT (OUTPATIENT)
Dept: FAMILY MEDICINE CLINIC | Facility: CLINIC | Age: 85
End: 2020-09-30
Payer: COMMERCIAL

## 2020-09-30 VITALS
DIASTOLIC BLOOD PRESSURE: 80 MMHG | HEART RATE: 70 BPM | BODY MASS INDEX: 28.41 KG/M2 | OXYGEN SATURATION: 97 % | TEMPERATURE: 96.9 F | HEIGHT: 67 IN | SYSTOLIC BLOOD PRESSURE: 148 MMHG | WEIGHT: 181 LBS

## 2020-09-30 DIAGNOSIS — H60.501 ACUTE OTITIS EXTERNA OF RIGHT EAR, UNSPECIFIED TYPE: Primary | ICD-10-CM

## 2020-09-30 DIAGNOSIS — H61.22 IMPACTED CERUMEN OF LEFT EAR: ICD-10-CM

## 2020-09-30 PROCEDURE — 3725F SCREEN DEPRESSION PERFORMED: CPT | Performed by: FAMILY MEDICINE

## 2020-09-30 PROCEDURE — 69210 REMOVE IMPACTED EAR WAX UNI: CPT | Performed by: FAMILY MEDICINE

## 2020-09-30 PROCEDURE — 1036F TOBACCO NON-USER: CPT | Performed by: FAMILY MEDICINE

## 2020-09-30 PROCEDURE — 3288F FALL RISK ASSESSMENT DOCD: CPT | Performed by: FAMILY MEDICINE

## 2020-09-30 PROCEDURE — 99213 OFFICE O/P EST LOW 20 MIN: CPT | Performed by: FAMILY MEDICINE

## 2020-09-30 PROCEDURE — 1101F PT FALLS ASSESS-DOCD LE1/YR: CPT | Performed by: FAMILY MEDICINE

## 2020-09-30 PROCEDURE — 1160F RVW MEDS BY RX/DR IN RCRD: CPT | Performed by: FAMILY MEDICINE

## 2020-09-30 NOTE — PROGRESS NOTES
Assessment/Plan:     Diagnoses and all orders for this visit:    Acute otitis externa of right ear, unspecified type  -     neomycin-polymyxin-hydrocortisone (CORTISPORIN) otic solution; Administer 3 drops to the right ear every 6 (six) hours    Impacted cerumen of left ear      left ear cerumen removed with instrumentation only  Right ear has otitis externa  Will start Cortisporin otic as above  Patient will need an follow-up appointment in December for his Medicare wellness visit or sooner if needed      Subjective:     Chief Complaint   Patient presents with    Earache     x2 weeks         Patient ID: Antoine Siegel is a 80 y o  male  Patient is here for right ear pain  States his ears very tender  Feels kind of wet and has a discharge  His left ear also feels full  He has no other complaints today      The following portions of the patient's history were reviewed and updated as appropriate: allergies, current medications, past family history, past medical history, past social history, past surgical history and problem list     Review of Systems   Constitutional: Negative  HENT: Positive for ear discharge and ear pain  Eyes: Negative  Respiratory: Negative  Cardiovascular: Negative  Gastrointestinal: Negative  Endocrine: Negative  Genitourinary: Negative  Musculoskeletal: Negative  Skin: Negative  Allergic/Immunologic: Negative  Neurological: Negative  Hematological: Negative  Psychiatric/Behavioral: Negative  All other systems reviewed and are negative  Objective:    Vitals:    09/30/20 1313   BP: 148/80   BP Location: Left arm   Patient Position: Sitting   Cuff Size: Large   Pulse: 70   Temp: (!) 96 9 °F (36 1 °C)   TempSrc: Tympanic   SpO2: 97%   Weight: 82 1 kg (181 lb)   Height: 5' 7" (1 702 m)          Physical Exam  Vitals signs and nursing note reviewed  Constitutional:       General: He is not in acute distress       Appearance: He is well-developed  HENT:      Head: Normocephalic  Right Ear: External ear normal       Left Ear: External ear normal       Ears:      Comments: Right ear has an otitis externa new left ear has a cerumen impaction that is affecting his hearing     Nose: Nose normal    Eyes:      General:         Right eye: No discharge  Left eye: No discharge  Conjunctiva/sclera: Conjunctivae normal       Pupils: Pupils are equal, round, and reactive to light  Neck:      Musculoskeletal: Normal range of motion  Cardiovascular:      Rate and Rhythm: Normal rate and regular rhythm  Heart sounds: Normal heart sounds  Pulmonary:      Effort: Pulmonary effort is normal       Breath sounds: Normal breath sounds  Abdominal:      General: Bowel sounds are normal  There is no distension  Palpations: Abdomen is soft  Tenderness: There is no abdominal tenderness  Musculoskeletal: Normal range of motion  Skin:     General: Skin is warm and dry  Findings: No rash  Neurological:      Mental Status: He is alert and oriented to person, place, and time  Cranial Nerves: No cranial nerve deficit  Psychiatric:         Behavior: Behavior normal          Thought Content: Thought content normal          Judgment: Judgment normal          Ear cerumen removal    Date/Time: 9/30/2020 2:01 PM  Performed by: Danielle Jane DO  Authorized by: Danielle Jane DO     Patient location:  Bedside  Consent:     Consent obtained:  Verbal    Risks discussed:  Bleeding, infection, pain, incomplete removal, dizziness and TM perforation    Alternatives discussed:  Alternative treatment  Universal protocol:     Procedure explained and questions answered to patient or proxy's satisfaction: yes      Relevant documents present and verified: yes      Test results available and properly labeled: yes      Radiology Images displayed and confirmed    If images not available, report reviewed: yes      Required blood products, implants, devices and special equipment available: yes      Site/side marked: yes      Immediately prior to procedure a time out was called: yes      Patient identity confirmed:  Verbally with patient  Procedure details:     Location:  L ear    Procedure type: curette      Approach:  Natural orifice  Post-procedure details:     Complication:  None    Hearing quality:  Improved    Patient tolerance of procedure: Tolerated well, no immediate complications          BMI Counseling: Body mass index is 28 35 kg/m²  The BMI is above normal  Nutrition recommendations include reducing portion sizes, decreasing overall calorie intake, 3-5 servings of fruits/vegetables daily, reducing fast food intake, consuming healthier snacks, decreasing soda and/or juice intake, moderation in carbohydrate intake, increasing intake of lean protein, reducing intake of saturated fat and trans fat and reducing intake of cholesterol  Exercise recommendations include exercising 3-5 times per week

## 2020-10-22 ENCOUNTER — TELEPHONE (OUTPATIENT)
Dept: FAMILY MEDICINE CLINIC | Facility: CLINIC | Age: 85
End: 2020-10-22

## 2020-12-21 NOTE — PATIENT INSTRUCTIONS
Obesity   AMBULATORY CARE:   Obesity  is when your body mass index (BMI) is greater than 30  Your healthcare provider will use your height and weight to measure your BMI  The risks of obesity include  many health problems, such as injuries or physical disability  You may need tests to check for the following:  · Diabetes    · High blood pressure or high cholesterol    · Heart disease    · Gallbladder or liver disease    · Cancer of the colon, breast, prostate, liver, or kidney    · Sleep apnea    · Arthritis or gout    Seek care immediately if:   · You have a severe headache, confusion, or difficulty speaking  · You have weakness on one side of your body  · You have chest pain, sweating, or shortness of breath  Contact your healthcare provider if:   · You have symptoms of gallbladder or liver disease, such as pain in your upper abdomen  · You have knee or hip pain and discomfort while walking  · You have symptoms of diabetes, such as intense hunger and thirst, and frequent urination  · You have symptoms of sleep apnea, such as snoring or daytime sleepiness  · You have questions or concerns about your condition or care  Treatment for obesity  focuses on helping you lose weight to improve your health  Even a small decrease in BMI can reduce the risk for many health problems  Your healthcare provider will help you set a weight-loss goal   · Lifestyle changes  are the first step in treating obesity  These include making healthy food choices and getting regular physical activity  Your healthcare provider may suggest a weight-loss program that involves coaching, education, and therapy  · Medicine  may help you lose weight when it is used with a healthy diet and physical activity  · Surgery  can help you lose weight if you are very obese and have other health problems  There are several types of weight-loss surgery  Ask your healthcare provider for more information      Be successful losing weight:   · Set small, realistic goals  An example of a small goal is to walk for 20 minutes 5 days a week  Anther goal is to lose 5% of your body weight  · Tell friends, family members, and coworkers about your goals  and ask for their support  Ask a friend to lose weight with you, or join a weight-loss support group  · Identify foods or triggers that may cause you to overeat , and find ways to avoid them  Remove tempting high-calorie foods from your home and workplace  Place a bowl of fresh fruit on your kitchen counter  If stress causes you to eat, then find other ways to cope with stress  · Keep a diary to track what you eat and drink  Also write down how many minutes of physical activity you do each day  Weigh yourself once a week and record it in your diary  Eating changes: You will need to eat 500 to 1,000 fewer calories each day than you currently eat to lose 1 to 2 pounds a week  The following changes will help you cut calories:  · Eat smaller portions  Use small plates, no larger than 9 inches in diameter  Fill your plate half full of fruits and vegetables  Measure your food using measuring cups until you know what a serving size looks like  · Eat 3 meals and 1 or 2 snacks each day  Plan your meals in advance  [place] and eat at home most of the time  Eat slowly  Do not skip meals  Skipping meals can lead to overeating later in the day  This can make it harder for you to lose weight  Talk with a dietitian to help you make a meal plan and schedule that is right for you  · Eat fruits and vegetables at every meal   They are low in calories and high in fiber, which makes you feel full  Do not add butter, margarine, or cream sauce to vegetables  Use herbs to season steamed vegetables  · Eat less fat and fewer fried foods  Eat more baked or grilled chicken and fish  These protein sources are lower in calories and fat than red meat  Limit fast food   Dress your salads with olive oil and vinegar instead of bottled dressing  · Limit the amount of sugar you eat  Do not drink sugary beverages  Limit alcohol  Activity changes:  Physical activity is good for your body in many ways  It helps you burn calories and build strong muscles  It decreases stress and depression, and improves your mood  It can also help you sleep better  Talk to your healthcare provider before you begin an exercise program   · Exercise for at least 30 minutes 5 days a week  Start slowly  Set aside time each day for physical activity that you enjoy and that is convenient for you  It is best to do both weight training and an activity that increases your heart rate, such as walking, bicycling, or swimming  · Find ways to be more active  Do yard work and housecleaning  Walk up the stairs instead of using elevators  Spend your leisure time going to events that require walking, such as outdoor festivals or fairs  This extra physical activity can help you lose weight and keep it off  Follow up with your healthcare provider as directed: You may need to meet with a dietitian  Write down your questions so you remember to ask them during your visits  © Copyright [address] [place] [address] Drive Information is for End User's use only and may not be sold, redistributed or otherwise used for commercial purposes  All illustrations and images included in CareNotes® are the copyrighted property of A D A M , Inc  or [facility] [employer]   The above information is an  only  It is not intended as medical advice for individual conditions or treatments  Talk to your doctor, nurse or pharmacist before following any medical regimen to see if it is safe and effective for you  Weight Management   AMBULATORY CARE:   Why it is important to manage your weight:  Being overweight increases your risk of health conditions such as heart disease, high blood pressure, type 2 diabetes, and certain types of cancer   It can also increase your risk for osteoarthritis, sleep apnea, and other respiratory problems  Aim for a slow, steady weight loss  Even a small amount of weight loss can lower your risk of health problems  How to lose weight safely:  A safe and healthy way to lose weight is to eat fewer calories and get regular exercise  · You can lose up about 1 pound a week by decreasing the number of calories you eat by 500 calories each day  You can decrease calories by eating smaller portion sizes or by cutting out high-calorie foods  Read labels to find out how many calories are in the foods you eat  · You can also burn calories with exercise such as walking, swimming, or biking  You will be more likely to keep weight off if you make these changes part of your lifestyle  Exercise at least 30 minutes per day on most days of the week  You can also fit in more physical activity by taking the stairs instead of the elevator or parking farther away from stores  Ask your healthcare provider about the best exercise plan for you  Healthy meal plan for weight management:  A healthy meal plan includes a variety of foods, contains fewer calories, and helps you stay healthy  A healthy meal plan includes the following:     · Eat whole-grain foods more often  A healthy meal plan should contain fiber  Fiber is the part of grains, fruits, and vegetables that is not broken down by your body  Whole-grain foods are healthy and provide extra fiber in your diet  Some examples of whole-grain foods are whole-wheat breads and pastas, oatmeal, brown rice, and bulgur  · Eat a variety of vegetables every day  Include dark, leafy greens such as spinach, kale, raul greens, and mustard greens  Eat yellow and orange vegetables such as carrots, sweet potatoes, and winter squash  · Eat a variety of fruits every day  Choose fresh or canned fruit (canned in its own juice or light syrup) instead of juice  Fruit juice has very little or no fiber      · Eat low-fat dairy foods  Drink fat-free (skim) milk or 1% milk  Eat fat-free yogurt and low-fat cottage cheese  Try low-fat cheeses such as mozzarella and other reduced-fat cheeses  · Choose meat and other protein foods that are low in fat  Choose beans or other legumes such as split peas or lentils  Choose fish, skinless poultry (chicken or turkey), or lean cuts of red meat (beef or pork)  Before you cook meat or poultry, cut off any visible fat  · Use less fat and oil  Try baking foods instead of frying them  Add less fat, such as margarine, sour cream, regular salad dressing and mayonnaise to foods  Eat fewer high-fat foods  Some examples of high-fat foods include french fries, doughnuts, ice cream, and cakes  · Eat fewer sweets  Limit foods and drinks that are high in sugar  This includes candy, cookies, regular soda, and sweetened drinks  Ways to decrease calories:   · Eat smaller portions  ? Use a small plate with smaller servings  ? Do not eat second helpings  ? When you eat at a restaurant, ask for a box and place half of your meal in the box before you eat  ? Share an entrée with someone else  · Replace high-calorie snacks with healthy, low-calorie snacks  ? Choose fresh fruit, vegetables, fat-free rice cakes, or air-popped popcorn instead of potato chips, nuts, or chocolate  ? Choose water or calorie-free drinks instead of soda or sweetened drinks  · Do not shop for groceries when you are hungry  You may be more likely to make unhealthy food choices  Take a grocery list of healthy foods and shop after you have eaten  · Eat regular meals  Do not skip meals  Skipping meals can lead to overeating later in the day  This can make it harder for you to lose weight  Eat a healthy snack in place of a meal if you do not have time to eat a regular meal  Talk with a dietitian to help you create a meal plan and schedule that is right for you      Other things to consider as you try to lose weight:   · Be aware of situations that may give you the urge to overeat, such as eating while watching television  Find ways to avoid these situations  For example, read a book, go for a walk, or do crafts  · Meet with a weight loss support group or friends who are also trying to lose weight  This may help you stay motivated to continue working on your weight loss goals  © Copyright [address] Information is for End User's use only and may not be sold, redistributed or otherwise used for commercial purposes  All illustrations and images included in CareNotes® are the copyrighted property of [employer]  or [facility] [employer]   The above information is an  only  It is not intended as medical advice for individual conditions or treatments  Talk to your doctor, nurse or pharmacist before following any medical regimen to see if it is safe and effective for you

## 2021-01-30 ENCOUNTER — IMMUNIZATIONS (OUTPATIENT)
Dept: FAMILY MEDICINE CLINIC | Facility: HOSPITAL | Age: 86
End: 2021-01-30

## 2021-01-30 DIAGNOSIS — Z23 ENCOUNTER FOR IMMUNIZATION: Primary | ICD-10-CM

## 2021-01-30 PROCEDURE — 91301 SARS-COV-2 / COVID-19 MRNA VACCINE (MODERNA) 100 MCG: CPT

## 2021-01-30 PROCEDURE — 0011A SARS-COV-2 / COVID-19 MRNA VACCINE (MODERNA) 100 MCG: CPT

## 2021-02-25 ENCOUNTER — IMMUNIZATIONS (OUTPATIENT)
Dept: FAMILY MEDICINE CLINIC | Facility: HOSPITAL | Age: 86
End: 2021-02-25

## 2021-02-25 DIAGNOSIS — Z23 ENCOUNTER FOR IMMUNIZATION: Primary | ICD-10-CM

## 2021-02-25 PROCEDURE — 91301 SARS-COV-2 / COVID-19 MRNA VACCINE (MODERNA) 100 MCG: CPT

## 2021-02-25 PROCEDURE — 0012A SARS-COV-2 / COVID-19 MRNA VACCINE (MODERNA) 100 MCG: CPT

## 2021-05-19 ENCOUNTER — RA CDI HCC (OUTPATIENT)
Dept: OTHER | Facility: HOSPITAL | Age: 86
End: 2021-05-19

## 2021-05-19 NOTE — PROGRESS NOTES
[place] [address]  coding opportunities        dx used     Chart reviewed, (number of) suggestions sent to provider: 2     Problem listed updated  Provider Accepted, (number of) suggestions accepted: 2        Patients insurance company: [address]  (Medicare [employer] and [employer])     Visit status: Patient arrived for their scheduled appointment        [place] [address]  coding opportunities             Chart reviewed, (number of) suggestions sent to provider: 2     Problem listed updated   Provider Accepted, (number of) suggestions accepted: 2        Patients insurance company: [address]  (Medicare [employer] and [employer])           [place] [address]  coding opportunities     DX: [id number] Unspecified atrial fibrillation  DX: [id number] [phone] Cardiomyopathy, unspecified          Chart reviewed, (number of) suggestions sent to provider: 2           Patients insurance company: [address]  (Medicare [employer]vantage and [employer])

## 2021-05-25 ENCOUNTER — OFFICE VISIT (OUTPATIENT)
Dept: FAMILY MEDICINE CLINIC | Facility: CLINIC | Age: 86
End: 2021-05-25
Payer: COMMERCIAL

## 2021-05-25 VITALS
DIASTOLIC BLOOD PRESSURE: 80 MMHG | SYSTOLIC BLOOD PRESSURE: 138 MMHG | HEIGHT: 66 IN | HEART RATE: 72 BPM | WEIGHT: 183 LBS | TEMPERATURE: 98.5 F | OXYGEN SATURATION: 99 % | RESPIRATION RATE: 14 BRPM | BODY MASS INDEX: 29.41 KG/M2

## 2021-05-25 DIAGNOSIS — Z00.00 MEDICARE ANNUAL WELLNESS VISIT, SUBSEQUENT: Primary | ICD-10-CM

## 2021-05-25 DIAGNOSIS — I48.91 ATRIAL FIBRILLATION, UNSPECIFIED TYPE (HCC): ICD-10-CM

## 2021-05-25 DIAGNOSIS — I42.9 CARDIOMYOPATHY, UNSPECIFIED TYPE (HCC): ICD-10-CM

## 2021-05-25 PROCEDURE — G0439 PPPS, SUBSEQ VISIT: HCPCS | Performed by: FAMILY MEDICINE

## 2021-05-25 PROCEDURE — 3288F FALL RISK ASSESSMENT DOCD: CPT | Performed by: FAMILY MEDICINE

## 2021-05-25 PROCEDURE — 1160F RVW MEDS BY RX/DR IN RCRD: CPT | Performed by: FAMILY MEDICINE

## 2021-05-25 PROCEDURE — 1101F PT FALLS ASSESS-DOCD LE1/YR: CPT | Performed by: FAMILY MEDICINE

## 2021-05-25 PROCEDURE — 3725F SCREEN DEPRESSION PERFORMED: CPT | Performed by: FAMILY MEDICINE

## 2021-05-25 PROCEDURE — 1036F TOBACCO NON-USER: CPT | Performed by: FAMILY MEDICINE

## 2021-05-25 PROCEDURE — 1125F AMNT PAIN NOTED PAIN PRSNT: CPT | Performed by: FAMILY MEDICINE

## 2021-05-25 PROCEDURE — 1170F FXNL STATUS ASSESSED: CPT | Performed by: FAMILY MEDICINE

## 2021-05-25 NOTE — PATIENT INSTRUCTIONS

## 2021-05-25 NOTE — PROGRESS NOTES
Assessment/Plan:     Diagnoses and all orders for this visit:    Medicare annual wellness visit, subsequent    Cardiomyopathy, unspecified type [facility])  patient follows with Cardiology  Atrial fibrillation, unspecified type [facility])  Patient follows with Cardiology    Medicare wellness visit completed  Patient will continue his current medications  He is up-to-date on health maintenance  He can follow up in 6 months or sooner if needed  He has an upcoming cardiology appointment in Cardiology usually orders his lab work      Subjective:     Chief Complaint   Patient presents with   [facility] Wellness Visit     subsequent        Patient ID: Augustine Hull is a 80 y o  male  Patient presents today for Medicare wellness visit  He has no acute physical complaints today      The following portions of the patient's history were reviewed and updated as appropriate: allergies, current medications, past family history, past medical history, past social history, past surgical history and problem list     Review of Systems   Constitutional: Negative  HENT: Negative  Eyes: Negative  Respiratory: Negative  Cardiovascular: Negative  Gastrointestinal: Negative  Endocrine: Negative  Genitourinary: Negative  Musculoskeletal: Negative  Skin: Negative  Allergic/Immunologic: Negative  Neurological: Negative  Hematological: Negative  Psychiatric/Behavioral: Negative  All other systems reviewed and are negative  Objective:    Vitals:    05/25/21 1550   BP: 138/80   BP Location: Left arm   Patient Position: Sitting   Cuff Size: Standard   Pulse: 72   Resp: 14   Temp: 98 5 °F (36 9 °C)   TempSrc: Tympanic   SpO2: 99%   Weight: 83 kg (183 lb)   Height: 5' 6" (1 676 m)          Physical Exam  Vitals signs and nursing note reviewed  Constitutional:       General: He is not in acute distress  Appearance: He is well-developed  HENT:      Head: Normocephalic        Right Ear: External ear normal       Left Ear: External ear normal       Nose: Nose normal    Eyes:      General:         Right eye: No discharge  Left eye: No discharge  Conjunctiva/sclera: Conjunctivae normal       Pupils: Pupils are equal, round, and reactive to light  Neck:      Musculoskeletal: Normal range of motion  Cardiovascular:      Rate and Rhythm: Normal rate and regular rhythm  Heart sounds: Normal heart sounds  Comments: +systolic murmur  Pulmonary:      Effort: Pulmonary effort is normal       Breath sounds: Normal breath sounds  Abdominal:      General: Bowel sounds are normal  There is no distension  Palpations: Abdomen is soft  Tenderness: There is no abdominal tenderness  Musculoskeletal: Normal range of motion  Skin:     General: Skin is warm and dry  Findings: No rash  Neurological:      Mental Status: He is alert and oriented to person, place, and time  Cranial Nerves: No cranial nerve deficit  Psychiatric:         Behavior: Behavior normal          Thought Content: Thought content normal          Judgment: Judgment normal           Assessment and Plan:     Problem List Items Addressed This Visit        Cardiovascular and Mediastinum    Atrial fibrillation ([address] )    Cardiomyopathy ([facility] [address] )      Other Visit Diagnoses     Medicare annual wellness visit, subsequent    -  Primary        BMI Counseling: Body mass index is 29 54 kg/m²  The BMI is above normal  Nutrition recommendations include moderation in carbohydrate intake  BMI Counseling: Body mass index is 29 54 kg/m²  Follow-up plan was not completed due to elderly patient (72 years old) where weight reduction/weight gain would complicate underlying health condition such as: illness or physical disability  Preventive health issues were discussed with patient, and age appropriate screening tests were ordered as noted in patient's After Visit Summary    Personalized health advice and appropriate referrals for health education or preventive services given if needed, as noted in patient's After Visit Summary  History of Present Illness:     Patient presents for Medicare Annual Wellness visit    Patient Care Team:  Lorena Luis DO as PCP - General     Problem List:     Patient Active Problem List   Diagnosis    Age-related macular degeneration    Aortic regurgitation    Atrial fibrillation (HCC)    AV block    Benign prostatic hyperplasia    Bilateral dry eyes    Bilateral impacted cerumen    Carcinoma in situ of skin    Cardiomyopathy ([address] )    Cataract    Disorder of skin    Hearing loss    Hyperlipidemia    Hypertension    Mitral prolapse    Other microscopic hematuria    Hematoma      Past Medical and Surgical History:     Past Medical History:   Diagnosis Date    Cataract     resolved: 10/19/15    Other microscopic hematuria     resolved: 1/5/18     Past Surgical History:   Procedure Laterality Date    CARDIAC PACEMAKER PLACEMENT      Dual-Chamber    HERNIA REPAIR      KNEE ARTHROSCOPY        Family History:     Family History   Problem Relation Age of Onset    No Known Problems Mother     No Known Problems Father       Social History:     E-Cigarette/Vaping    E-Cigarette Use Never User      E-Cigarette/Vaping Substances    Nicotine No     THC No     CBD No     Flavoring No     Other No     Unknown No      Social History     Socioeconomic History    Marital status:       Spouse name: None    Number of children: 2    Years of education: None    Highest education level: None   Occupational History    None   Social Needs    Financial resource strain: Not hard at all   [employer] insecurity     Worry: Never true     Inability: Never true   [employer] needs     Medical: No     Non-medical: No   Tobacco Use    Smoking status: Never Smoker    Smokeless tobacco: Never Used   Substance and Sexual Activity    Alcohol use: Yes     Frequency: Monthly or less     Drinks per session: 1 or 2     Binge frequency: Never     Comment: social    Drug use: Never    Sexual activity: Not Currently   Lifestyle    Physical activity     Days per week: 5 days     Minutes per session: 60 min    Stress: Not at all   Relationships    Social connections     Talks on phone: More than three times a week     Gets together: More than three times a week     Attends Confucianist service: More than 4 times per year     Active member of club or organization: Yes     Attends meetings of clubs or organizations: More than 4 times per year     Relationship status:     Intimate partner violence     Fear of current or ex partner: No     Emotionally abused: No     Physically abused: No     Forced sexual activity: No   Other Topics Concern    None   Social History Narrative    Exercising regularly    Working full time      Medications and Allergies:     Current Outpatient Medications   Medication Sig Dispense Refill    apixaban (ELIQUIS) 5 mg Take 5 mg by mouth 2 (two) times a day      doxazosin (CARDURA) 8 MG tablet Take 1 tablet by mouth daily      losartan (COZAAR) 50 mg tablet Take 1 tablet by mouth daily      metoprolol succinate (TOPROL-XL) 50 mg 24 hr tablet Take 2 tablets by mouth daily      simvastatin (ZOCOR) 20 mg tablet Take by mouth daily      mupirocin (BACTROBAN) 2 % ointment Apply topically 3 (three) times a day for 10 days (Patient not taking: Reported on 6/8/2020) 22 g 0    neomycin-polymyxin-hydrocortisone (CORTISPORIN) otic solution Administer 3 drops to the right ear every 6 (six) hours (Patient not taking: Reported on 5/25/2021) 10 mL 1     No current facility-administered medications for this visit        No Known Allergies   Immunizations:     Immunization History   Administered Date(s) Administered    INFLUENZA 11/13/2012, 08/17/2015, 11/02/2016, 11/03/2017, 10/18/2018    Influenza Split High Dose Preservative Free IM 11/02/2016    Influenza, seasonal, injectable 10/24/2013, 08/17/2015    Pneumococcal Polysaccharide PPV23 10/18/2003, 05/06/2011    SARS-CoV-2 / COVID-19 mRNA IM (Moderna) 01/30/2021, 02/25/2021    influenza, trivalent, adjuvanted 08/29/2019      Health Maintenance: There are no preventive care reminders to display for this patient  There are no preventive care reminders to display for this patient  Medicare Health Risk Assessment:     /80 (BP Location: Left arm, Patient Position: Sitting, Cuff Size: Standard)   Pulse 72   Temp 98 5 °F (36 9 °C) (Tympanic)   Resp 14   Ht 5' 6" (1 676 m)   Wt 83 kg (183 lb)   SpO2 99%   BMI 29 54 kg/m²      Deonte Kang is here for his Subsequent Wellness visit  Last Medicare Wellness visit information reviewed, patient interviewed and updates made to the record today  Health Risk Assessment:   Patient rates overall health as good  Patient feels that their physical health rating is same  Patient is satisfied with their life  Eyesight was rated as same  Hearing was rated as same  Patient feels that their emotional and mental health rating is same  Patients states they are never, rarely angry  Patient states they are never, rarely unusually tired/fatigued  Pain experienced in the last 7 days has been none  Patient states that he has experienced no weight loss or gain in last 6 months  Depression Screening:   PHQ-2 Score: 0      Fall Risk Screening: In the past year, patient has experienced: no history of falling in past year      Home Safety:  Patient does not have trouble with stairs inside or outside of their home  Patient has working smoke alarms and has working carbon monoxide detector  Home safety hazards include: none  Nutrition:   Current diet is Regular  Medications:   Patient is currently taking over-the-counter supplements  OTC medications include: see medication list  Patient is able to manage medications       Activities of Daily Living (ADLs)/Instrumental Activities of Daily Living (IADLs): Walk and transfer into and out of bed and chair?: Yes  Dress and groom yourself?: Yes    Bathe or shower yourself?: Yes    Feed yourself? Yes  Do your laundry/housekeeping?: Yes  Manage your money, pay your bills and track your expenses?: Yes  Make your own meals?: Yes    Do your own shopping?: Yes    Previous Hospitalizations:   Any hospitalizations or ED visits within the last 12 months?: No      Advance Care Planning:   Living will: Yes    Durable POA for healthcare: Yes    Advanced directive: Yes    Advanced directive counseling given: Yes    Five wishes given: No    End of Life Decisions reviewed with patient: Yes    Provider agrees with end of life decisions: Yes      Cognitive Screening:   Provider or family/friend/caregiver concerned regarding cognition?: No    PREVENTIVE SCREENINGS      Cardiovascular Screening:    General: History Lipid Disorder and Screening Current      Diabetes Screening:     General: Screening Current      Colorectal Cancer Screening:     General: Screening Not Indicated      Prostate Cancer Screening:    General: Screening Not Indicated      Osteoporosis Screening:    General: Screening Not Indicated      Abdominal Aortic Aneurysm (AAA) Screening:        General: Screening Not Indicated      Lung Cancer Screening:     General: Screening Not Indicated      Hepatitis C Screening:    General: Screening Not Indicated    Screening, Brief Intervention, and Referral to Treatment (SBIRT)    Screening  Typical number of drinks in a day: 0    Single Item Drug Screening:  How often have you used an illegal drug (including marijuana) or a prescription medication for non-medical reasons in the past year? never    Single Item Drug Screen Score: 0  Interpretation: Negative screen for possible drug use disorder    Brief Intervention  Alcohol & drug use screenings were reviewed  No concerns regarding substance use disorder identified       Other Counseling Topics:   Car/seat belt/driving safety, skin self-exam, sunscreen and regular weightbearing exercise and calcium and vitamin D intake         Weston Page, DO

## 2021-09-20 ENCOUNTER — OFFICE VISIT (OUTPATIENT)
Dept: FAMILY MEDICINE CLINIC | Facility: CLINIC | Age: 86
End: 2021-09-20
Payer: COMMERCIAL

## 2021-09-20 ENCOUNTER — TELEPHONE (OUTPATIENT)
Dept: FAMILY MEDICINE CLINIC | Facility: CLINIC | Age: 86
End: 2021-09-20

## 2021-09-20 VITALS
HEART RATE: 75 BPM | DIASTOLIC BLOOD PRESSURE: 64 MMHG | RESPIRATION RATE: 16 BRPM | OXYGEN SATURATION: 96 % | BODY MASS INDEX: 28.77 KG/M2 | HEIGHT: 66 IN | SYSTOLIC BLOOD PRESSURE: 122 MMHG | WEIGHT: 179 LBS | TEMPERATURE: 96.9 F

## 2021-09-20 DIAGNOSIS — I83.899 BLEEDING FROM VARICOSE VEIN: Primary | ICD-10-CM

## 2021-09-20 DIAGNOSIS — I10 ESSENTIAL HYPERTENSION: ICD-10-CM

## 2021-09-20 DIAGNOSIS — H61.23 BILATERAL HEARING LOSS DUE TO CERUMEN IMPACTION: ICD-10-CM

## 2021-09-20 PROCEDURE — 3725F SCREEN DEPRESSION PERFORMED: CPT | Performed by: FAMILY MEDICINE

## 2021-09-20 PROCEDURE — 99214 OFFICE O/P EST MOD 30 MIN: CPT | Performed by: FAMILY MEDICINE

## 2021-09-20 PROCEDURE — 69210 REMOVE IMPACTED EAR WAX UNI: CPT | Performed by: FAMILY MEDICINE

## 2021-09-20 PROCEDURE — 1036F TOBACCO NON-USER: CPT | Performed by: FAMILY MEDICINE

## 2021-09-20 PROCEDURE — 1160F RVW MEDS BY RX/DR IN RCRD: CPT | Performed by: FAMILY MEDICINE

## 2021-09-20 NOTE — TELEPHONE ENCOUNTER
Patient called  Patient was in ER on 9/18/21 for bleeding wound that would not stop  Patient was advised to see PCP in 2 days from 9/18/21 for a wound check  Please advise where I can schedule patient   [phone]     Thank you

## 2021-09-20 NOTE — PROGRESS NOTES
Assessment/Plan:       Diagnoses and all orders for this visit:    Bleeding from varicose vein  -     Ambulatory referral to Vascular Surgery; Future    Bilateral hearing loss due to cerumen impaction       Wound is no longer bleeding  Patient referred to vascular surgery  Discussed local wound care  Bilateral cerumen impactions removed without complications  Discussed patient's blood pressure is well controlled today he will continue his losartan metoprolol and doxazosin      Subjective:     Chief Complaint   Patient presents with    Follow-up     [place] ER visit 09/18/21        Patient ID: Brent Causey is a 80 y o  male  Patient was seen in the urgent care over the weekend for a wound on his leg that would not stop bleeding  He is here for follow-up   He is also complaining of bilateral hearing loss he cannot uses hearing aids due to cerumen impaction      The following portions of the patient's history were reviewed and updated as appropriate: allergies, current medications, past family history, past medical history, past social history, past surgical history and problem list     Review of Systems   Constitutional: Negative  HENT: Negative  Eyes: Negative  Respiratory: Negative  Cardiovascular: Negative  Gastrointestinal: Negative  Endocrine: Negative  Genitourinary: Negative  Musculoskeletal: Negative  Skin: Negative  Allergic/Immunologic: Negative  Neurological: Negative  Hematological: Negative  Psychiatric/Behavioral: Negative  All other systems reviewed and are negative  Objective:    Vitals:    09/20/21 1601   BP: 122/64   BP Location: Right arm   Patient Position: Sitting   Cuff Size: Standard   Pulse: 75   Resp: 16   Temp: (!) 96 9 °F (36 1 °C)   TempSrc: Tympanic   SpO2: 96%   Weight: 81 2 kg (179 lb)   Height: 5' 6" (1 676 m)          Physical Exam  Vitals and nursing note reviewed     Constitutional:       General: He is not in acute distress  Appearance: He is well-developed  HENT:      Head: Normocephalic  Right Ear: External ear normal       Left Ear: External ear normal       Ears:      Comments:  Bilateral cerumen impactions     Nose: Nose normal    Eyes:      General:         Right eye: No discharge  Left eye: No discharge  Conjunctiva/sclera: Conjunctivae normal       Pupils: Pupils are equal, round, and reactive to light  Cardiovascular:      Rate and Rhythm: Normal rate and regular rhythm  Heart sounds: Normal heart sounds  Pulmonary:      Effort: Pulmonary effort is normal       Breath sounds: Normal breath sounds  Abdominal:      General: Bowel sounds are normal  There is no distension  Palpations: Abdomen is soft  Tenderness: There is no abdominal tenderness  Musculoskeletal:         General: Normal range of motion  Cervical back: Normal range of motion  Skin:     General: Skin is warm and dry  Findings: No rash  Neurological:      Mental Status: He is alert and oriented to person, place, and time  Cranial Nerves: No cranial nerve deficit  Psychiatric:         Behavior: Behavior normal          Thought Content: Thought content normal          Judgment: Judgment normal          Ear cerumen removal    Date/Time: 9/20/2021 4:55 PM  Performed by: Delroy Acevedo DO  Authorized by: Delroy Acevedo DO   Universal Protocol:  Consent: Verbal consent obtained  Consent given by: patient  Patient understanding: patient states understanding of the procedure being performed  Patient consent: the patient's understanding of the procedure matches consent given  Procedure consent: procedure consent matches procedure scheduled  Relevant documents: relevant documents present and verified  Test results: test results available and properly labeled  Site marked: the operative site was marked  Radiology Images displayed and confirmed   If images not available, report reviewed: imaging studies available  Patient identity confirmed: verbally with patient      Patient location:  Clinic  Procedure details:     Location:  L ear and R ear    Procedure type: irrigation with instrumentation      Approach:  Natural orifice  Post-procedure details:     Complication:  None    Hearing quality:  Improved    Patient tolerance of procedure:   Tolerated well, no immediate complications

## 2021-11-15 ENCOUNTER — CONSULT (OUTPATIENT)
Dept: VASCULAR SURGERY | Facility: CLINIC | Age: 86
End: 2021-11-15
Payer: COMMERCIAL

## 2021-11-15 VITALS
HEIGHT: 66 IN | WEIGHT: 183 LBS | BODY MASS INDEX: 29.41 KG/M2 | SYSTOLIC BLOOD PRESSURE: 132 MMHG | HEART RATE: 64 BPM | DIASTOLIC BLOOD PRESSURE: 70 MMHG

## 2021-11-15 DIAGNOSIS — I83.899 BLEEDING FROM VARICOSE VEIN: ICD-10-CM

## 2021-11-15 PROCEDURE — 1036F TOBACCO NON-USER: CPT | Performed by: NURSE PRACTITIONER

## 2021-11-15 PROCEDURE — 99203 OFFICE O/P NEW LOW 30 MIN: CPT | Performed by: NURSE PRACTITIONER

## 2021-11-15 RX ORDER — ZINC GLUCONATE 50 MG
50 TABLET ORAL
COMMUNITY

## 2021-11-15 RX ORDER — VITAMIN E (DL,TOCOPHERYL ACET) 45 MG/0.25
500 DROPS ORAL
COMMUNITY

## 2021-11-15 RX ORDER — MULTIVITAMIN WITH IRON
250 TABLET ORAL
COMMUNITY

## 2021-11-18 PROBLEM — I83.899 BLEEDING FROM VARICOSE VEIN: Status: ACTIVE | Noted: 2021-11-18

## 2021-12-13 ENCOUNTER — CLINICAL SUPPORT (OUTPATIENT)
Dept: VASCULAR SURGERY | Facility: CLINIC | Age: 86
End: 2021-12-13
Payer: COMMERCIAL

## 2021-12-13 VITALS
WEIGHT: 181 LBS | HEIGHT: 66 IN | HEART RATE: 60 BPM | DIASTOLIC BLOOD PRESSURE: 70 MMHG | SYSTOLIC BLOOD PRESSURE: 132 MMHG | BODY MASS INDEX: 29.09 KG/M2

## 2021-12-13 DIAGNOSIS — I83.899 BLEEDING FROM VARICOSE VEIN: Primary | ICD-10-CM

## 2021-12-13 PROCEDURE — 36468 NJX SCLRSNT SPIDER VEINS: CPT | Performed by: NURSE PRACTITIONER

## 2022-06-08 NOTE — TELEPHONE ENCOUNTER
Why is he getting this done? DX code please  Thank you    [email or web address] Expected Date Of Service: 06/08/2022 Billing Type: Third-Party Bill Performing Laboratory: [id number] Bill For Surgical Tray: no

## 2022-06-29 ENCOUNTER — RA CDI HCC (OUTPATIENT)
Dept: OTHER | Facility: HOSPITAL | Age: 87
End: 2022-06-29

## 2022-06-29 NOTE — PROGRESS NOTES
[facility]  coding opportunities       Chart reviewed, no opportunity found:   [address]        Patients Insurance     Medicare Insurance: [employer] Advantage

## 2022-07-06 ENCOUNTER — OFFICE VISIT (OUTPATIENT)
Dept: FAMILY MEDICINE CLINIC | Facility: CLINIC | Age: 87
End: 2022-07-06
Payer: COMMERCIAL

## 2022-07-06 VITALS
HEIGHT: 66 IN | WEIGHT: 176.8 LBS | HEART RATE: 70 BPM | DIASTOLIC BLOOD PRESSURE: 76 MMHG | RESPIRATION RATE: 18 BRPM | OXYGEN SATURATION: 91 % | BODY MASS INDEX: 28.42 KG/M2 | SYSTOLIC BLOOD PRESSURE: 116 MMHG

## 2022-07-06 DIAGNOSIS — H61.23 BILATERAL IMPACTED CERUMEN: Primary | ICD-10-CM

## 2022-07-06 PROCEDURE — 3725F SCREEN DEPRESSION PERFORMED: CPT | Performed by: NURSE PRACTITIONER

## 2022-07-06 PROCEDURE — 1160F RVW MEDS BY RX/DR IN RCRD: CPT | Performed by: NURSE PRACTITIONER

## 2022-07-06 PROCEDURE — 99213 OFFICE O/P EST LOW 20 MIN: CPT | Performed by: NURSE PRACTITIONER

## 2022-07-06 RX ORDER — LOSARTAN POTASSIUM 100 MG/1
TABLET ORAL
COMMUNITY
Start: 2022-06-23 | End: 2022-07-06 | Stop reason: DRUGHIGH

## 2022-07-06 NOTE — PATIENT INSTRUCTIONS
Earwax Blockage   AMBULATORY CARE:   Earwax  can build up in your ear canal and cause a blockage  Earwax blockage happens when your ear makes earwax faster than your body can remove it  Common symptoms include the following:   Trouble hearing    Earache    Ear fullness or a feeling that something is plugging up your ear    Itching or ringing in your ear    Dizziness    Seed immediate care if:   You feel dizzy  You have discharge or blood coming out of your ear  Your ear pain does not go away or gets worse  Call your doctor if:   You have a fever  You have trouble hearing or hear ringing noises  You have questions or concerns about your condition or care  Treatment for earwax blockage:   Medicines  placed in the ear canal can soften the earwax so it will come out  Flushing your ear canal  with warm water may flush out the earwax  Small medical tools  may be used to remove the earwax  How to prevent earwax blockage:  Do not stick anything into your ears to clean them  Use cotton swabs on the outside of your ear only  Ask your healthcare provider for more information on ways to prevent blockage  Follow up with your doctor as directed:  Write down your questions so you remember to ask them during your visits  © Copyright [employer] 2022 Information is for End User's use only and may not be sold, redistributed or otherwise used for commercial purposes  All illustrations and images included in CareNotes® are the copyrighted property of [employer]  or Pablo Zamora  The above information is an  only  It is not intended as medical advice for individual conditions or treatments  Talk to your doctor, nurse or pharmacist before following any medical regimen to see if it is safe and effective for you

## 2022-07-06 NOTE — PROGRESS NOTES
Assessment/Plan:    No problem-specific Assessment & Plan notes found for this encounter  Problem List Items Addressed This Visit        Nervous and Auditory    Bilateral impacted cerumen - Primary    Relevant Medications    neomycin-polymyxin-hydrocortisone (CORTISPORIN) otic solution    Other Relevant Orders    Ear cerumen removal            Subjective:      Patient ID: Rosie Abbott is a 80 y o  male  Patient here today to have ears cleaned out  Patient reports decreased hearing and muffled sounds in B/L ears  No ear pain  No ear drainage  Patient reports history of ear wax impaction  The following portions of the patient's history were reviewed and updated as appropriate: allergies, current medications, past family history, past medical history, past social history, past surgical history and problem list     Review of Systems   Constitutional: Negative  Negative for chills, fatigue and fever  HENT: Negative  Negative for congestion, ear discharge, ear pain, rhinorrhea, sinus pressure, sinus pain and sore throat  Eyes: Negative  Negative for pain, discharge and visual disturbance  Respiratory: Negative  Negative for cough, chest tightness, shortness of breath and wheezing  Cardiovascular: Negative  Negative for chest pain, palpitations and leg swelling  Gastrointestinal: Negative  Negative for abdominal pain, constipation, diarrhea, nausea and vomiting  Endocrine: Negative  Negative for polydipsia and polyuria  Genitourinary: Negative  Negative for difficulty urinating, dysuria and hematuria  Musculoskeletal: Positive for arthralgias (intermittent left knee pain )  Negative for back pain and myalgias  Skin: Negative  Negative for rash and wound  Allergic/Immunologic: Negative  Negative for environmental allergies  Neurological: Negative for dizziness, seizures, syncope, light-headedness and headaches  Hematological: Negative  Does not bruise/bleed easily  Psychiatric/Behavioral: Negative for dysphoric mood  The patient is not nervous/anxious  All other systems reviewed and are negative  Objective:      /76 (BP Location: Left arm, Patient Position: Sitting, Cuff Size: Large)   Pulse 70   Resp 18   Ht 5' 6" (1 676 m)   Wt 80 2 kg (176 lb 12 8 oz)   SpO2 91%   BMI 28 54 kg/m²          Physical Exam  Constitutional:       General: He is not in acute distress  Appearance: Normal appearance  He is normal weight  He is not ill-appearing  HENT:      Head: Normocephalic and atraumatic  Right Ear: Tympanic membrane, ear canal and external ear normal  There is no impacted cerumen  Left Ear: Tympanic membrane, ear canal and external ear normal  There is no impacted cerumen  Nose: Nose normal  No congestion or rhinorrhea  Mouth/Throat:      Mouth: Mucous membranes are moist       Pharynx: Oropharynx is clear  No oropharyngeal exudate or posterior oropharyngeal erythema  Eyes:      Extraocular Movements: Extraocular movements intact  Conjunctiva/sclera: Conjunctivae normal    Cardiovascular:      Rate and Rhythm: Normal rate  Rhythm irregular  Pulses: Normal pulses  Heart sounds: Murmur (2/6 ANDRZEJ ) heard  Pulmonary:      Effort: Pulmonary effort is normal  No respiratory distress  Breath sounds: Normal breath sounds  No wheezing  Abdominal:      General: Bowel sounds are normal  There is no distension  Palpations: Abdomen is soft  Tenderness: There is no abdominal tenderness  Musculoskeletal:         General: Normal range of motion  Cervical back: Normal range of motion and neck supple  Right lower leg: Edema (trace pitting edema in B/L LE's) present  Left lower leg: Edema (trace pitting edema in B/L LE's) present  Lymphadenopathy:      Cervical: No cervical adenopathy  Skin:     General: Skin is warm and dry  Capillary Refill: Capillary refill takes less than 2 seconds  Findings: No lesion or rash  Comments: Dark discoloration in skin to RLE shin  Neurological:      General: No focal deficit present  Mental Status: He is alert and oriented to person, place, and time  Psychiatric:         Mood and Affect: Mood normal          Behavior: Behavior normal          Thought Content: Thought content normal          Judgment: Judgment normal              Ear cerumen removal    Date/Time: 7/6/2022 2:55 PM  Performed by: RON Young  Authorized by: RON Young   Universal Protocol:  Consent: Verbal consent obtained  Risks and benefits: risks, benefits and alternatives were discussed  Consent given by: patient  Time out: Immediately prior to procedure a "time out" was called to verify the correct patient, procedure, equipment, support staff and site/side marked as required  Timeout called at: 7/6/2022 2:55 PM   Patient understanding: patient states understanding of the procedure being performed  Patient identity confirmed: verbally with patient      Patient location:  Clinic  Indications / Diagnosis:  Bilateral ear cerumen impaction   Procedure details:     Local anesthetic:  None    Location:  L ear and R ear    Procedure type: irrigation with instrumentation      Instrumentation: curette      Approach:  Natural orifice  Post-procedure details:     Complication:  None    Hearing quality:  Improved    Patient tolerance of procedure: Tolerated well, no immediate complications  Comments:      Removed almost all of ear wax to B/L ears  Patient tolerated procedure well without complications  Patient reported improvement in hearing in B/L ears

## 2022-08-24 ENCOUNTER — TELEMEDICINE (OUTPATIENT)
Dept: FAMILY MEDICINE CLINIC | Facility: CLINIC | Age: 87
End: 2022-08-24
Payer: COMMERCIAL

## 2022-08-24 VITALS — WEIGHT: 179 LBS | HEIGHT: 66 IN | BODY MASS INDEX: 28.77 KG/M2

## 2022-08-24 DIAGNOSIS — U07.1 COVID-19: Primary | ICD-10-CM

## 2022-08-24 PROCEDURE — 99442 PR PHYS/QHP TELEPHONE EVALUATION 11-20 MIN: CPT | Performed by: NURSE PRACTITIONER

## 2022-08-24 RX ORDER — NIRMATRELVIR AND RITONAVIR 300-100 MG
3 KIT ORAL 2 TIMES DAILY
Qty: 30 TABLET | Refills: 0 | Status: SHIPPED | OUTPATIENT
Start: 2022-08-24 | End: 2022-08-29

## 2022-08-24 NOTE — PROGRESS NOTES
COVID-19 Outpatient Progress Note    Assessment/Plan:    Problem List Items Addressed This Visit        Other    COVID-19 - Primary         Disposition:     Patient meets criteria for PAXLOVID and they have been counseled appropriately according to EUA documentation released by the FDA  After discussion, patient agrees to treatment  Nidia Warner is an investigational medicine used to treat mild-to-moderate COVID-19 in adults and children (15years of age and older weighing at least 80 pounds (40 kg)) with positive results of direct SARS-CoV-2 viral testing, and who are at high risk for progression to severe COVID-19, including hospitalization or death  PAXLOVID is investigational because it is still being studied  There is limited information about the safety and effectiveness of using PAXLOVID to treat people with mild-to-moderate COVID-19  The FDA has authorized the emergency use of PAXLOVID for the treatment of mild-tomoderate COVID-19 in adults and children (15years of age and older weighing at least 80 pounds (40 kg)) with a positive test for the virus that causes COVID-19, and who are at high risk for progression to severe COVID-19, including hospitalization or death, under an EUA  What should I tell my healthcare provider before I take PAXLOVID? Tell your healthcare provider if you:  - Have any allergies  - Have liver or kidney disease  - Are pregnant or plan to become pregnant  - Are breastfeeding a child  - Have any serious illnesses    Tell your healthcare provider about all the medicines you take, including prescription and over-the-counter medicines, vitamins, and herbal supplements  Some medicines may interact with PAXLOVID and may cause serious side effects  Keep a list of your medicines to show your healthcare provider and pharmacist when you get a new medicine  You can ask your healthcare provider or pharmacist for a list of medicines that interact with PAXLOVID   Do not start taking a new medicine without telling your healthcare provider  Your healthcare provider can tell you if it is safe to take PAXLOVID with other medicines  Tell your healthcare provider if you are taking combined hormonal contraceptive  PAXLOVID may affect how your birth control pills work  Females who are able to become pregnant should use another effective alternative form of contraception or an additional barrier method of contraception  Talk to your healthcare provider if you have any questions about contraceptive methods that might be right for you  How do I take PAXLOVID? PAXLOVID consists of 2 medicines: nirmatrelvir and ritonavir  - Take 2 pink tablets of nirmatrelvir with 1 white tablet of ritonavir by mouth 2 times each day (in the morning and in the evening) for 5 days  For each dose, take all 3 tablets at the same time  - If you have kidney disease, talk to your healthcare provider  You may need a different dose  - Swallow the tablets whole  Do not chew, break, or crush the tablets  - Take PAXLOVID with or without food  - Do not stop taking PAXLOVID without talking to your healthcare provider, even if you feel better  - If you miss a dose of PAXLOVID within 8 hours of the time it is usually taken, take it as soon as you remember  If you miss a dose by more than 8 hours, skip the missed dose and take the next dose at your regular time  Do not take 2 doses of PAXLOVID at the same time  - If you take too much PAXLOVID, call your healthcare provider or go to the nearest hospital emergency room right away  - If you are taking a ritonavir- or cobicistat-containing medicine to treat hepatitis C or Human Immunodeficiency Virus (HIV), you should continue to take your medicine as prescribed by your healthcare provider   - Talk to your healthcare provider if you do not feel better or if you feel worse after 5 days  Who should generally not take PAXLOVID?     Do not take PAXLOVID if:  You are allergic to nirmatrelvir, ritonavir, or any of the ingredients in PAXLOVID  You are taking any of the following medicines:  - Alfuzosin  - Pethidine, piroxicam, propoxyphene  - Ranolazine  - Amiodarone, dronedarone, flecainide, propafenone, quinidine  - Colchicine  - Lurasidone, pimozide, clozapine  - Dihydroergotamine, ergotamine, methylergonovine  - Lovastatin, simvastatin  - Sildenafil (Revatio®) for pulmonary arterial hypertension (PAH)  - Triazolam, oral midazolam  - Apalutamide  - Carbamazepine, phenobarbital, phenytoin  - Rifampin  - St  Adens Wort (hypericum perforatum)    What are the important possible side effects of PAXLOVID? Possible side effects of PAXLOVID are:  - Liver Problems  Tell your healthcare provider right away if you have any of these signs and symptoms of liver problems: loss of appetite, yellowing of your skin and the whites of eyes (jaundice), dark-colored urine, pale colored stools and itchy skin, stomach area (abdominal) pain  - Resistance to HIV Medicines  If you have untreated HIV infection, PAXLOVID may lead to some HIV medicines not working as well in the future  - Other possible side effects include: altered sense of taste, diarrhea, high blood pressure, or muscle aches    These are not all the possible side effects of PAXLOVID  Not many people have taken PAXLOVID  Serious and unexpected side effects may happen  [address] is still being studied, so it is possible that all of the risks are not known at this time  What other treatment choices are there? Like Cheko Betts may allow for the emergency use of other medicines to treat people with COVID-19  Go to https://My[employer]/ for information on the emergency use of other medicines that are authorized by FDA to treat people with COVID-19   Your healthcare provider may talk with you about clinical trials for which you may be eligible  It is your choice to be treated or not to be treated with PAXLOVID  Should you decide not to receive it or for your child not to receive it, it will not change your standard medical care  What if I am pregnant or breastfeeding? There is no experience treating pregnant women or breastfeeding mothers with PAXLOVID  For a mother and unborn baby, the benefit of taking PAXLOVID may be greater than the risk from the treatment  If you are pregnant, discuss your options and specific situation with your healthcare provider  It is recommended that you use effective barrier contraception or do not have sexual activity while taking PAXLOVID  If you are breastfeeding, discuss your options and specific situation with your healthcare provider  How do I report side effects with PAXLOVID? Contact your healthcare provider if you have any side effects that bother you or do not go away  Report side effects to FDA MedWatch at www [employer] gov/medwatch or call 3-558-ABU5750 or you can report side effects to [facility]  at the contact information provided below  Website Fax number Telephone number   [employer] [phone]     How should I store Audrea Mendoza? Store PAXLOVID tablets at room temperature between 68°F to 77°F (20°C to 25°C)  Full fact sheet for patients, parents, and caregivers can be found at: [employer]    I have spent 15 minutes directly with the patient  Greater than 50% of this time was spent in counseling/coordination of care regarding: prognosis, risks and benefits of treatment options, instructions for management, patient and family education, importance of treatment compliance, risk factor reductions and impressions         Encounter provider: Alveda Sandifer, CRNP     Provider located at: [address]  [address] , [address] [address]  [phone] Recent Visits  No visits were found meeting these conditions  Showing recent visits within past 7 days and meeting all other requirements  Today's Visits  Date Type Provider Dept   08/24/22 Telemedicine [facility]   Showing today's visits and meeting all other requirements  Future Appointments  No visits were found meeting these conditions  Showing future appointments within next 150 days and meeting all other requirements     This virtual check-in was done via telephone and he agrees to proceed  Patient agrees to participate in a virtual check in via telephone or video visit instead of presenting to the office to address urgent/immediate medical needs  Patient is aware this is a billable service  He acknowledged consent and understanding of privacy and security of the video platform  The patient has agreed to participate and understands they can discontinue the visit at any time  After connecting through Telephone, the patient was identified by name and date of birth  Brenda De Jesus was informed that this was a telemedicine visit and that the exam was being conducted confidentially over secure lines  My office door was closed  No one else was in the room  Brenda De Jesus acknowledged consent and understanding of privacy and security of the telemedicine visit  I informed the patient that I have reviewed his record in Epic and presented the opportunity for him to ask any questions regarding the visit today  The patient agreed to participate  Verification of patient location:  Patient is located in the following state in which I hold an active license: [place]    Subjective:   Brenda De Jesus is a 80 y o  male who is concerned about COVID-19  Patient's symptoms include chills, fatigue, malaise, rhinorrhea, cough (productive cough with clear mucus), myalgias (in both legs ) and headache (intermittent )   Patient denies fever, congestion, sore throat, anosmia, loss of taste, shortness of breath, chest tightness, abdominal pain, nausea, vomiting and diarrhea  - Date of symptom onset: 8/22/2022      COVID-19 vaccination status: Fully vaccinated with booster    Exposure:   Contact with a person who is under investigation (PUI) for or who is positive for COVID-19 within the last 14 days?: No    Hospitalized recently for fever and/or lower respiratory symptoms?: No      Currently a healthcare worker that is involved in direct patient care?: No      Works in a special setting where the risk of COVID-19 transmission may be high? (this may include long-term care, correctional and prison facilities; homeless shelters; assisted-living facilities and group homes ): No      Resident in a special setting where the risk of COVID-19 transmission may be high? (this may include long-term care, correctional and prison facilities; homeless shelters; assisted-living facilities and group homes ): No      Patient had positive home covid test yesterday 8/23/22  Obtained labs from Cardiology office from 7/7/2022 and BUN: 23, Creatinine: 1 09, GFR: 60  Patient was advised to hold Simvastatin for duration of Paxlovid dose  Patient may continue Eliquis at normal dose despite Paxlovid therapy       No results found for: SARSCOV2, [facility], SARSCORONAVI, CORONAVIRUSR, SARSCOVAG, [facility]  Past Medical History:   Diagnosis Date    Cataract     resolved: 10/19/15    Other microscopic hematuria     resolved: 1/5/18     Past Surgical History:   Procedure Laterality Date    CARDIAC PACEMAKER PLACEMENT      Dual-Chamber    HERNIA REPAIR      KNEE ARTHROSCOPY       Current Outpatient Medications   Medication Sig Dispense Refill    apixaban (ELIQUIS) 5 mg Take 5 mg by mouth 2 (two) times a day      Bee Pollen 500 MG CHEW Chew 500 mg      Calcium Carbonate (CALCIUM 600 PO) Take 600 mg by mouth      Coenzyme Q10 (Co Q 10) 100 MG CAPS Take 100 mg by mouth      doxazosin (CARDURA) 8 MG tablet Take 1 tablet by mouth daily      losartan (COZAAR) 50 mg tablet Take 1 tablet by mouth daily      Magnesium 250 MG TABS Take 250 mg by mouth      metoprolol succinate (TOPROL-XL) 50 mg 24 hr tablet Take 2 tablets by mouth daily      selenium 200 mcg Take 200 mcg by mouth daily      simvastatin (ZOCOR) 20 mg tablet Take by mouth daily      Zinc 50 MG TABS Take 50 mg by mouth       No current facility-administered medications for this visit  No Known Allergies    Review of Systems   Constitutional: Positive for chills and fatigue  Negative for diaphoresis and fever  HENT: Positive for rhinorrhea  Negative for congestion, ear discharge, ear pain, sinus pressure, sinus pain and sore throat  Eyes: Negative  Negative for pain, discharge and visual disturbance  Respiratory: Positive for cough (productive cough with clear mucus)  Negative for chest tightness, shortness of breath and wheezing  Cardiovascular: Negative  Negative for chest pain, palpitations and leg swelling  Gastrointestinal: Negative  Negative for abdominal pain, constipation, diarrhea, nausea and vomiting  Endocrine: Negative  Negative for polydipsia and polyuria  Genitourinary: Negative  Negative for difficulty urinating, dysuria and hematuria  Musculoskeletal: Positive for myalgias (in both legs )  Negative for arthralgias  Skin: Negative  Negative for rash and wound  Allergic/Immunologic: Negative  Negative for environmental allergies  Neurological: Positive for headaches (intermittent )  Negative for dizziness, seizures, syncope and light-headedness  Hematological: Negative  Does not bruise/bleed easily  Psychiatric/Behavioral: Negative  Negative for dysphoric mood  The patient is not nervous/anxious  Objective:    Vitals:    08/24/22 1139   Weight: 81 2 kg (179 lb)   Height: 5' 6" (1 676 m)       Physical Exam  Neurological:      General: No focal deficit present        Mental Status: He is oriented to person, place, and time  Psychiatric:         Mood and Affect: Mood normal          Behavior: Behavior normal          Thought Content:  Thought content normal          Judgment: Judgment normal

## 2022-09-21 ENCOUNTER — TELEPHONE (OUTPATIENT)
Dept: FAMILY MEDICINE CLINIC | Facility: CLINIC | Age: 87
End: 2022-09-21

## 2022-09-21 NOTE — TELEPHONE ENCOUNTER
[address] [facility]  Nimisha Hook       Reason for documentation: Patient was flagged by Third Party Payer and/or internal report as being due for a refill on the following medications:    Simvastatin 20 mg; pharmacy fill rate 72% (goal > 80%); refill due date 8/7/2022    Outcome:  Patient unavailable at time of call  Spoke with daughter and contact information for return call

## 2022-11-25 ENCOUNTER — APPOINTMENT (EMERGENCY)
Dept: CT IMAGING | Facility: HOSPITAL | Age: 87
End: 2022-11-25

## 2022-11-25 ENCOUNTER — APPOINTMENT (EMERGENCY)
Dept: RADIOLOGY | Facility: HOSPITAL | Age: 87
End: 2022-11-25

## 2022-11-25 ENCOUNTER — HOSPITAL ENCOUNTER (INPATIENT)
Facility: HOSPITAL | Age: 87
LOS: 6 days | Discharge: HOME | End: 2022-12-01
Attending: SURGERY | Admitting: SURGERY

## 2022-11-25 ENCOUNTER — HOSPITAL ENCOUNTER (EMERGENCY)
Facility: HOSPITAL | Age: 87
End: 2022-11-25
Attending: EMERGENCY MEDICINE

## 2022-11-25 VITALS
TEMPERATURE: 97.9 F | RESPIRATION RATE: 20 BRPM | HEART RATE: 79 BPM | OXYGEN SATURATION: 94 % | DIASTOLIC BLOOD PRESSURE: 65 MMHG | SYSTOLIC BLOOD PRESSURE: 136 MMHG

## 2022-11-25 DIAGNOSIS — S22.49XA RIB FRACTURES: Primary | ICD-10-CM

## 2022-11-25 DIAGNOSIS — J94.2 HEMOTHORAX: ICD-10-CM

## 2022-11-25 DIAGNOSIS — S22.39XA RIB FRACTURE: ICD-10-CM

## 2022-11-25 DIAGNOSIS — S22.41XA CLOSED FRACTURE OF MULTIPLE RIBS OF RIGHT SIDE, INITIAL ENCOUNTER: ICD-10-CM

## 2022-11-25 DIAGNOSIS — J94.2 HEMOTHORAX ON RIGHT: ICD-10-CM
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
## 2022-11-25 LAB
2HR DELTA HS TROPONIN: 1 NG/L
4HR DELTA HS TROPONIN: 1 NG/L
ANION GAP SERPL CALCULATED.3IONS-SCNC: 9 MMOL/L (ref 4–13)
APTT PPP: 31 SECONDS (ref 23–37)
ATRIAL RATE: 79 BPM
BASOPHILS # BLD AUTO: 0.02 THOUSANDS/ÂΜL (ref 0–0.1)
BASOPHILS NFR BLD AUTO: 0 % (ref 0–1)
BUN SERPL-MCNC: 20 MG/DL (ref 5–25)
CALCIUM SERPL-MCNC: 9.4 MG/DL (ref 8.3–10.1)
CARDIAC TROPONIN I PNL SERPL HS: 16 NG/L
CARDIAC TROPONIN I PNL SERPL HS: 17 NG/L
CARDIAC TROPONIN I PNL SERPL HS: 17 NG/L
CHLORIDE SERPL-SCNC: 105 MMOL/L (ref 96–108)
CO2 SERPL-SCNC: 26 MMOL/L (ref 21–32)
CREAT SERPL-MCNC: 0.94 MG/DL (ref 0.6–1.3)
EOSINOPHIL # BLD AUTO: 0.13 THOUSAND/ÂΜL (ref 0–0.61)
EOSINOPHIL NFR BLD AUTO: 1 % (ref 0–6)
ERYTHROCYTE [DISTWIDTH] IN BLOOD BY AUTOMATED COUNT: 13.6 % (ref 11.6–15.1)
GFR SERPL CREATININE-BSD FRML MDRD: 71 ML/MIN/1.73SQ M
GLUCOSE SERPL-MCNC: 118 MG/DL (ref 65–140)
HCT VFR BLD AUTO: 37.6 % (ref 36.5–49.3)
HGB BLD-MCNC: 12 G/DL (ref 12–17)
IMM GRANULOCYTES # BLD AUTO: 0.03 THOUSAND/UL (ref 0–0.2)
IMM GRANULOCYTES NFR BLD AUTO: 0 % (ref 0–2)
INR PPP: 1.26 (ref 0.84–1.19)
LYMPHOCYTES # BLD AUTO: 1.66 THOUSANDS/ÂΜL (ref 0.6–4.47)
LYMPHOCYTES NFR BLD AUTO: 18 % (ref 14–44)
MCH RBC QN AUTO: 31.3 PG (ref 26.8–34.3)
MCHC RBC AUTO-ENTMCNC: 31.9 G/DL (ref 31.4–37.4)
MCV RBC AUTO: 98 FL (ref 82–98)
MONOCYTES # BLD AUTO: 1.22 THOUSAND/ÂΜL (ref 0.17–1.22)
MONOCYTES NFR BLD AUTO: 13 % (ref 4–12)
NEUTROPHILS # BLD AUTO: 6.33 THOUSANDS/ÂΜL (ref 1.85–7.62)
NEUTS SEG NFR BLD AUTO: 68 % (ref 43–75)
NRBC BLD AUTO-RTO: 0 /100 WBCS
P AXIS: 66 DEGREES
PLATELET # BLD AUTO: 188 THOUSANDS/UL (ref 149–390)
PMV BLD AUTO: 10.6 FL (ref 8.9–12.7)
POTASSIUM SERPL-SCNC: 4 MMOL/L (ref 3.5–5.3)
PR INTERVAL: 214 MS
PROTHROMBIN TIME: 16.6 SECONDS (ref 11.6–14.5)
QRS AXIS: -57 DEGREES
QRSD INTERVAL: 144 MS
QT INTERVAL: 406 MS
QTC INTERVAL: 465 MS
RBC # BLD AUTO: 3.83 MILLION/UL (ref 3.88–5.62)
SODIUM SERPL-SCNC: 140 MMOL/L (ref 135–147)
T WAVE AXIS: 103 DEGREES
VENTRICULAR RATE: 79 BPM
WBC # BLD AUTO: 9.39 THOUSAND/UL (ref 4.31–10.16)

## 2022-11-25 PROCEDURE — 0W9930Z DRAINAGE OF RIGHT PLEURAL CAVITY WITH DRAINAGE DEVICE, PERCUTANEOUS APPROACH: ICD-10-PCS | Performed by: SURGERY

## 2022-11-25 RX ORDER — OXYCODONE HYDROCHLORIDE 10 MG/1
10 TABLET ORAL EVERY 6 HOURS PRN
Status: DISCONTINUED | OUTPATIENT
Start: 2022-11-25 | End: 2022-11-28

## 2022-11-25 RX ORDER — OXYCODONE HYDROCHLORIDE 5 MG/1
5 TABLET ORAL EVERY 6 HOURS PRN
Status: DISCONTINUED | OUTPATIENT
Start: 2022-11-25 | End: 2022-11-25

## 2022-11-25 RX ORDER — LIDOCAINE 50 MG/G
1 PATCH TOPICAL ONCE
Status: DISCONTINUED | OUTPATIENT
Start: 2022-11-25 | End: 2022-11-25 | Stop reason: HOSPADM

## 2022-11-25 RX ORDER — MUSCLE RUB CREAM 100; 150 MG/G; MG/G
CREAM TOPICAL 3 TIMES DAILY
Status: DISCONTINUED | OUTPATIENT
Start: 2022-11-25 | End: 2022-12-01 | Stop reason: HOSPADM

## 2022-11-25 RX ORDER — FENTANYL CITRATE 50 UG/ML
50 INJECTION, SOLUTION INTRAMUSCULAR; INTRAVENOUS ONCE
Status: COMPLETED | OUTPATIENT
Start: 2022-11-25 | End: 2022-11-25

## 2022-11-25 RX ORDER — LIDOCAINE 50 MG/G
1 PATCH TOPICAL ONCE
Status: COMPLETED | OUTPATIENT
Start: 2022-11-25 | End: 2022-11-26

## 2022-11-25 RX ORDER — METHOCARBAMOL 500 MG/1
500 TABLET, FILM COATED ORAL 2 TIMES DAILY
Status: DISCONTINUED | OUTPATIENT
Start: 2022-11-25 | End: 2022-11-28

## 2022-11-25 RX ORDER — CHOLECALCIFEROL (VITAMIN D3) 125 MCG
100 CAPSULE ORAL DAILY
Status: DISCONTINUED | OUTPATIENT
Start: 2022-11-25 | End: 2022-12-01 | Stop reason: HOSPADM

## 2022-11-25 RX ORDER — POLYETHYLENE GLYCOL 3350 17 G/17G
17 POWDER, FOR SOLUTION ORAL DAILY PRN
Status: DISCONTINUED | OUTPATIENT
Start: 2022-11-25 | End: 2022-11-28

## 2022-11-25 RX ORDER — METOPROLOL SUCCINATE 50 MG/1
100 TABLET, EXTENDED RELEASE ORAL DAILY
Status: DISCONTINUED | OUTPATIENT
Start: 2022-11-26 | End: 2022-11-26

## 2022-11-25 RX ORDER — OXYCODONE HYDROCHLORIDE 5 MG/1
5 TABLET ORAL EVERY 6 HOURS PRN
Status: DISCONTINUED | OUTPATIENT
Start: 2022-11-25 | End: 2022-11-28

## 2022-11-25 RX ORDER — PRAVASTATIN SODIUM 40 MG
40 TABLET ORAL DAILY
Status: DISCONTINUED | OUTPATIENT
Start: 2022-11-26 | End: 2022-12-01 | Stop reason: HOSPADM

## 2022-11-25 RX ORDER — METHOCARBAMOL 500 MG/1
500 TABLET, FILM COATED ORAL ONCE
Status: COMPLETED | OUTPATIENT
Start: 2022-11-25 | End: 2022-11-25

## 2022-11-25 RX ORDER — GABAPENTIN 100 MG/1
100 CAPSULE ORAL 3 TIMES DAILY
Status: DISCONTINUED | OUTPATIENT
Start: 2022-11-25 | End: 2022-11-28

## 2022-11-25 RX ORDER — SODIUM CHLORIDE, SODIUM GLUCONATE, SODIUM ACETATE, POTASSIUM CHLORIDE, MAGNESIUM CHLORIDE, SODIUM PHOSPHATE, DIBASIC, AND POTASSIUM PHOSPHATE .53; .5; .37; .037; .03; .012; .00082 G/100ML; G/100ML; G/100ML; G/100ML; G/100ML; G/100ML; G/100ML
75 INJECTION, SOLUTION INTRAVENOUS CONTINUOUS
Status: DISCONTINUED | OUTPATIENT
Start: 2022-11-25 | End: 2022-11-26

## 2022-11-25 RX ORDER — LOSARTAN POTASSIUM 50 MG/1
50 TABLET ORAL DAILY
Status: DISCONTINUED | OUTPATIENT
Start: 2022-11-26 | End: 2022-11-26

## 2022-11-25 RX ORDER — LIDOCAINE HYDROCHLORIDE 10 MG/ML
15 INJECTION, SOLUTION EPIDURAL; INFILTRATION; INTRACAUDAL; PERINEURAL ONCE
Status: COMPLETED | OUTPATIENT
Start: 2022-11-25 | End: 2022-11-25

## 2022-11-25 RX ORDER — AMOXICILLIN 250 MG
1 CAPSULE ORAL 2 TIMES DAILY
Status: DISCONTINUED | OUTPATIENT
Start: 2022-11-25 | End: 2022-12-01 | Stop reason: HOSPADM

## 2022-11-25 RX ORDER — ACETAMINOPHEN 325 MG/1
650 TABLET ORAL EVERY 6 HOURS SCHEDULED
Status: DISCONTINUED | OUTPATIENT
Start: 2022-11-25 | End: 2022-12-01 | Stop reason: HOSPADM

## 2022-11-25 RX ORDER — METHOCARBAMOL 500 MG/1
500 TABLET, FILM COATED ORAL EVERY 8 HOURS SCHEDULED
Status: DISCONTINUED | OUTPATIENT
Start: 2022-11-25 | End: 2022-11-25

## 2022-11-25 RX ORDER — DOXAZOSIN MESYLATE 4 MG/1
8 TABLET ORAL DAILY
Status: DISCONTINUED | OUTPATIENT
Start: 2022-11-26 | End: 2022-11-26

## 2022-11-25 RX ADMIN — Medication 100 MG: at 21:13

## 2022-11-25 RX ADMIN — ACETAMINOPHEN 650 MG: 325 TABLET ORAL at 21:13

## 2022-11-25 RX ADMIN — SENNOSIDES AND DOCUSATE SODIUM 1 TABLET: 8.6; 5 TABLET ORAL at 21:13

## 2022-11-25 RX ADMIN — LIDOCAINE 5% 1 PATCH: 700 PATCH TOPICAL at 16:13

## 2022-11-25 RX ADMIN — METHOCARBAMOL 500 MG: 500 TABLET ORAL at 16:13

## 2022-11-25 RX ADMIN — FENTANYL CITRATE 50 MCG: 50 INJECTION INTRAMUSCULAR; INTRAVENOUS at 18:37

## 2022-11-25 RX ADMIN — GABAPENTIN 100 MG: 300 CAPSULE ORAL at 21:13

## 2022-11-25 RX ADMIN — SODIUM CHLORIDE, SODIUM GLUCONATE, SODIUM ACETATE, POTASSIUM CHLORIDE, MAGNESIUM CHLORIDE, SODIUM PHOSPHATE, DIBASIC, AND POTASSIUM PHOSPHATE 75 ML/HR: .53; .5; .37; .037; .03; .012; .00082 INJECTION, SOLUTION INTRAVENOUS at 21:14

## 2022-11-25 RX ADMIN — LIDOCAINE HYDROCHLORIDE 15 ML: 10 INJECTION, SOLUTION EPIDURAL; INFILTRATION; INTRACAUDAL; PERINEURAL at 18:25

## 2022-11-25 RX ADMIN — METHOCARBAMOL 500 MG: 500 TABLET ORAL at 21:13

## 2022-11-25 RX ADMIN — IOHEXOL 100 ML: 350 INJECTION, SOLUTION INTRAVENOUS at 14:00

## 2022-11-25 RX ADMIN — LIDOCAINE 5% 1 PATCH: 700 PATCH TOPICAL at 21:13

## 2022-11-25 NOTE — ED PROVIDER NOTES
Emergency Department Trauma Note  Maryan Burkitt 80 y o  male MRN: [id number]  Unit/Bed#: ED 04/ED 04 Encounter: [id number]      Trauma Alert: Trauma Acuity: Trauma Evaluation  Model of Arrival: Mode of Arrival: Other (Comment) (Family) via    Trauma Team: Current Providers  Attending Provider: Jesus Mejias DO  Registered Nurse: Rhett Kenyon RN  Consultants:     None      History of Present Illness     Chief Complaint:   Chief Complaint   Patient presents with   • Fall     Fall yesterday in the afternoon  Denies hitting head or LOC  On eliquis     HPI:  Maryan Burkitt is a 80 y o  male who presents with mechanical fall that occurred yesterday     Mechanism:Details of Incident: [place] on the side of a snowblower Injury Date: 11/25/22 Injury Time: 1234 Injury Occurence Location - [address] [place] [address] Way: Tova Minaya     80year-old male presents for evaluation mechanical fall that occurred yesterday  Patient was in his garage and leaning over to reach for an item fell onto his right side  Patient states he hit the right side of his back on a   Denies head trauma  Reports immediate pain at that time which continued throughout the day today  Patient reports taking ibuprofen prior to arrival with mild relief  Started with shortness of breath a few hours ago which prompted him to present to the emergency department  Denies any further complaints or injuries  Patient is on Eliquis daily  Review of Systems   Constitutional: Negative for fever  Musculoskeletal: Positive for back pain  All other systems reviewed and are negative        Historical Information     Immunizations:   Immunization History   Administered Date(s) Administered   • COVID-19 MODERNA VACC 0 5 ML IM 01/30/2021, 02/25/2021, 11/23/2021   • INFLUENZA 11/13/2012, 08/17/2015, 11/02/2016, 11/03/2017, 10/18/2018   • Influenza Split High Dose Preservative Free IM 11/02/2016   • Influenza, seasonal, injectable 10/24/2013, 08/17/2015 • Pneumococcal Polysaccharide PPV23 10/18/2003, 05/06/2011   • influenza, trivalent, adjuvanted 08/29/2019       Past Medical History:   Diagnosis Date   • Cataract     resolved: 10/19/15   • Other microscopic hematuria     resolved: 1/5/18       Family History   Problem Relation Age of Onset   • No Known Problems Mother    • No Known Problems Father      Past Surgical History:   Procedure Laterality Date   • CARDIAC PACEMAKER PLACEMENT      Dual-Chamber   • HERNIA REPAIR     • KNEE ARTHROSCOPY       Social History     Tobacco Use   • Smoking status: Never   • Smokeless tobacco: Never   Vaping Use   • Vaping Use: Never used   Substance Use Topics   • Alcohol use: Yes     Comment: social   • Drug use: Never     E-Cigarette/Vaping   • E-Cigarette Use Never User      E-Cigarette/Vaping Substances   • Nicotine No    • THC No    • CBD No    • Flavoring No    • Other No    • Unknown No        Family History: non-contributory    Meds/Allergies   Prior to Admission Medications   Prescriptions Last Dose Informant Patient Reported? Taking?    Bee Pollen 500 MG CHEW   Yes No   Sig: Chew 500 mg   Calcium Carbonate (CALCIUM 600 PO)   Yes No   Sig: Take 600 mg by mouth   Coenzyme Q10 (Co Q 10) 100 MG CAPS   Yes No   Sig: Take 100 mg by mouth   Magnesium 250 MG TABS   Yes No   Sig: Take 250 mg by mouth   Zinc 50 MG TABS   Yes No   Sig: Take 50 mg by mouth   apixaban (ELIQUIS) 5 mg   Yes No   Sig: Take 5 mg by mouth 2 (two) times a day   doxazosin (CARDURA) 8 MG tablet   Yes No   Sig: Take 1 tablet by mouth daily   losartan (COZAAR) 50 mg tablet   Yes No   Sig: Take 1 tablet by mouth daily   metoprolol succinate (TOPROL-XL) 50 mg 24 hr tablet   Yes No   Sig: Take 2 tablets by mouth daily   selenium 200 mcg   Yes No   Sig: Take 200 mcg by mouth daily   simvastatin (ZOCOR) 20 mg tablet   Yes No   Sig: Take by mouth daily      Facility-Administered Medications: None       No Known Allergies    PHYSICAL EXAM    PE limited by: none    Objective   Vitals:   First set: Temperature: 97 9 °F (36 6 °C) (11/25/22 1228)  Pulse: 81 (11/25/22 1225)  Respirations: (!) 27 (11/25/22 1225)  Blood Pressure: 148/74 (11/25/22 1225)  SpO2: 95 % (11/25/22 1225)    Primary Survey:   (A) Airway: intact  (B) Breathing: b/l breath sounds  (C) Circulation: Pulses:   normal  (D) Disabliity:  GCS Total:  15  (E) Expose:  Completed    Secondary Survey: (Click on Physical Exam tab above)  Physical Exam  Vitals and nursing note reviewed  Constitutional:       General: He is not in acute distress  Appearance: He is well-developed  HENT:      Head: Normocephalic and atraumatic  Right Ear: External ear normal       Left Ear: External ear normal       Nose: Nose normal    Eyes:      General: No scleral icterus  Neck:      Comments: No C spine tenderness  Pulmonary:      Effort: Pulmonary effort is normal  No respiratory distress  Breath sounds: Normal breath sounds  Abdominal:      General: There is no distension  Palpations: Abdomen is soft  Musculoskeletal:         General: No deformity  Normal range of motion  Cervical back: Normal range of motion and neck supple  Comments: Small area of erythema right lateral thoracic region around T8-10 with associated tenderness to palpation   Skin:     General: Skin is warm  Findings: Erythema present  No rash  Neurological:      General: No focal deficit present  Mental Status: He is alert  Gait: Gait normal    Psychiatric:         Mood and Affect: Mood normal          Cervical spine cleared by clinical criteria?  No (imaging required)      Invasive Devices     Peripheral Intravenous Line  Duration           Peripheral IV 11/25/22 Right;Ventral (anterior) Forearm <1 day                Lab Results:   Results Reviewed     Procedure Component Value Units Date/Time    HS Troponin I 2hr [[id number]]  (Normal) Collected: 11/25/22 1436    Lab Status: Final result Specimen: Blood from Arm, Right Updated: 11/25/22 1517     hs TnI 2hr 17 ng/L      Delta 2hr hsTnI 1 ng/L     HS Troponin I 4hr [[id number]]     Lab Status: No result Specimen: Blood     HS Troponin 0hr (reflex protocol) [[id number]]  (Normal) Collected: 11/25/22 1236    Lab Status: Final result Specimen: Blood from Arm, Right Updated: 11/25/22 1309     hs TnI 0hr 16 ng/L     Protime-INR [[id number]]  (Abnormal) Collected: 11/25/22 1236    Lab Status: Final result Specimen: Blood from Arm, Right Updated: 11/25/22 1259     Protime 16 6 seconds      INR 1 26    APTT [[id number]]  (Normal) Collected: 11/25/22 1236    Lab Status: Final result Specimen: Blood from Arm, Right Updated: 11/25/22 1259     PTT 31 seconds     Basic metabolic panel [[id number]] Collected: 11/25/22 1236    Lab Status: Final result Specimen: Blood from Arm, Right Updated: 11/25/22 1257     Sodium 140 mmol/L      Potassium 4 0 mmol/L      Chloride 105 mmol/L      CO2 26 mmol/L      ANION GAP 9 mmol/L      BUN 20 mg/dL      Creatinine 0 94 mg/dL      Glucose 118 mg/dL      Calcium 9 4 mg/dL      eGFR 71 ml/min/1 73sq m     Narrative:      [place] guidelines for Chronic Kidney Disease (CKD):   •  Stage 1 with normal or high GFR (GFR > 90 mL/min/1 73 square meters)  •  Stage 2 Mild CKD (GFR = 60-89 mL/min/1 73 square meters)  •  Stage 3A Moderate CKD (GFR = 45-59 mL/min/1 73 square meters)  •  Stage 3B Moderate CKD (GFR = 30-44 mL/min/1 73 square meters)  •  Stage 4 Severe CKD (GFR = 15-29 mL/min/1 73 square meters)  •  Stage 5 End Stage CKD (GFR <15 mL/min/1 73 square meters)  Note: GFR calculation is accurate only with a steady state creatinine    CBC and differential [[id number]]  (Abnormal) Collected: 11/25/22 1236    Lab Status: Final result Specimen: Blood from Arm, Right Updated: 11/25/22 1240     WBC 9 39 Thousand/uL      RBC 3 83 Million/uL      Hemoglobin 12 0 g/dL      Hematocrit 37 6 %      MCV 98 fL      MCH 31 3 pg      MCHC 31 9 g/dL RDW 13 6 %      MPV 10 6 fL      Platelets 843 Thousands/uL      nRBC 0 /100 WBCs      Neutrophils Relative 68 %      Immat GRANS % 0 %      Lymphocytes Relative 18 %      Monocytes Relative 13 %      Eosinophils Relative 1 %      Basophils Relative 0 %      Neutrophils Absolute 6 33 Thousands/µL      Immature Grans Absolute 0 03 Thousand/uL      Lymphocytes Absolute 1 66 Thousands/µL      Monocytes Absolute 1 22 Thousand/µL      Eosinophils Absolute 0 13 Thousand/µL      Basophils Absolute 0 02 Thousands/µL                  Imaging Studies:   Direct to CT: No  TRAUMA - CT head wo contrast   Final Result by Margie Torrez MD (11/25 1429)      No acute intracranial pathology  In particular, no intracranial hemorrhage or calvarial fracture  Mild chronic microvascular ischemic changes  The study was marked in [facility] for immediate notification  Workstation performed: [id number]         TRAUMA - CT spine cervical wo contrast   Final Result by Margie Torrez MD (11/25 4065)      No cervical spine fracture or traumatic malalignment  Mild multilevel degenerative changes  The study was marked in [facility] for immediate notification  Workstation performed: [id number]         TRAUMA - CT chest abdomen pelvis w contrast   Final Result by Margie Torrez MD (11/25 6028)      Acute, mildly displaced fracture of right 10th rib posteriorly and nondisplaced fracture of right 11th rib posteriorly  Moderate right pleural effusion measuring mildly higher than simple fluid in density, likely due to a component of hemorrhage given the aforementioned rib fracture  Resultant atelectasis in the right lower and middle lobes  No evidence of acute trauma in the abdomen or pelvis  Moderate cardiomegaly  Fusiform ectasia of ascending thoracic aorta measuring 4 1 cm in diameter  No aortic dissection  Recommend follow-up low radiation chest CT in 12 months        Multiple additional findings as above  The study was marked in [facility] for immediate notification  Workstation performed: [id number]         XR Trauma chest portable   Final Result by Geetha Llanes MD (11/25 1302)      Small right pleural effusion and right lower lung opacity, which may represent atelectasis, or pneumonia in the appropriate clinical setting  The study was marked in [facility] for immediate notification  Workstation performed: [id number]               Procedures  Procedures         ED Course           MDM  Number of Diagnoses or Management Options  Hemothorax: new and requires workup  Rib fractures: new and requires workup  Diagnosis management comments: 80 yom with mech fall yesterday on eliquis  Trauma evaluation called  CXR, CTH, C spine, CAP  Symptom control  Incentive spirometry  D/w trauma team  Transfer to [facility]  Patient remains stable 98% on RA  Amount and/or Complexity of Data Reviewed  Clinical lab tests: reviewed and ordered  Tests in the radiology section of CPT®: ordered and reviewed  Tests in the medicine section of CPT®: ordered and reviewed  Decide to obtain previous medical records or to obtain history from someone other than the patient: yes  Review and summarize past medical records: yes            Disposition  [facility] Transfer: No  Final diagnoses:   Rib fractures   Hemothorax     Time reflects when diagnosis was documented in both MDM as applicable and the Disposition within this note     Time User Action Codes Description Comment    11/25/2022  3:10 PM Clint Lowe Add [Q08 56LM] Rib fractures     11/25/2022  3:10 PM Clint Lowe Add [J94 2] Hemothorax       ED Disposition     ED Disposition   Transfer to Another Facility-In Network    Condition   --    Date/Time   Fri Nov 25, 2022  3:10 PM    Comment   Nelsonfifi Aryan should be transferred out to [facility]             MD Documentation    Flowsheet Row Most Recent Value   Patient Condition The patient has been stabilized such that within reasonable medical probability, no material deterioration of the patient condition or the condition of the unborn child(rama) is likely to result from the transfer   Reason for Transfer Level of Care needed not available at this facility   Benefits of Transfer Specialized equipment and/or services available at the receiving facility (Include comment)________________________   Risks of Transfer Potential for delay in receiving treatment   Accepting Physician Dr Olivares   [address] Name, Mariola Becerra   Provider Certification General risk, such as traffic hazards, adverse weather conditions, rough terrain or turbulence, possible failure of equipment (including vehicle or aircraft), or consequences of actions of persons outside the control of the transport personnel      RN Documentation    [address] Name, [address]  [facility]      Follow-up Information    None       Patient's Medications   Discharge Prescriptions    No medications on file     No discharge procedures on file      PDMP Review     None          ED Provider  Electronically Signed by         Milena Becerra DO  11/25/22 1544

## 2022-11-25 NOTE — ED NOTES
Transport [facility]  B ED TRAUMA  Dr  TO  [address] [place] [address], RN  11/25/22 2714

## 2022-11-25 NOTE — ED NOTES
Oxygen dropped to 91% on RA, patient placed on 1L via nasal canula   Oxygen at 96%     Ana Asher  11/25/22 0169

## 2022-11-25 NOTE — EMTALA/ACUTE CARE TRANSFER
[facility] EMERGENCY DEPARTMENT  [address]  [place] [facility] [address]  Dept: [phone]      UIELJG TRANSFER CONSENT    NAME Sagrario Briggs                                         10/21/1933                              MRN [id number]    I have been informed of my rights regarding examination, treatment, and transfer   by Dr Magnolia Dennis DO    Benefits: Specialized equipment and/or services available at the receiving facility (Include comment)________________________    Risks: Potential for delay in receiving treatment      Consent for Transfer:  I acknowledge that my medical condition has been evaluated and explained to me by the emergency department physician or other qualified medical person and/or my attending physician, who has recommended that I be transferred to the service of  Accepting Physician: Dr Olivares at [address] Name, [address] : [facility]  The above potential benefits of such transfer, the potential risks associated with such transfer, and the probable risks of not being transferred have been explained to me, and I fully understand them  The doctor has explained that, in my case, the benefits of transfer outweigh the risks  I agree to be transferred  I authorize the performance of emergency medical procedures and treatments upon me in both transit and upon arrival at the receiving facility  Additionally, I authorize the release of any and all medical records to the receiving facility and request they be transported with me, if possible  I understand that the safest mode of transportation during a medical emergency is an ambulance and that the Hospital advocates the use of this mode of transport  Risks of traveling to the receiving facility by car, including absence of medical control, life sustaining equipment, such as oxygen, and medical personnel has been explained to me and I fully understand them      (SENIA CORRECT BOX BELOW)  [  ]  I consent to the stated transfer and to be transported by ambulance/helicopter  [  ]  I consent to the stated transfer, but refuse transportation by ambulance and accept full responsibility for my transportation by car  I understand the risks of non-ambulance transfers and I exonerate the Hospital and its staff from any deterioration in my condition that results from this refusal     X___________________________________________    DATE  22  TIME________  Signature of patient or legally responsible individual signing on patient behalf           RELATIONSHIP TO PATIENT_________________________          Provider Certification    NAME Doreen Segura                                         10/21/1933                              MRN [id number]    A medical screening exam was performed on the above named patient  Based on the examination:    Condition Necessitating Transfer The primary encounter diagnosis was Rib fractures  A diagnosis of Hemothorax was also pertinent to this visit  Patient Condition: The patient has been stabilized such that within reasonable medical probability, no material deterioration of the patient condition or the condition of the unborn child(rama) is likely to result from the transfer    Reason for Transfer: Level of Care needed not available at this facility    Transfer Requirements: Facility [facility]   · Space available and qualified personnel available for treatment as acknowledged by    · Agreed to accept transfer and to provide appropriate medical treatment as acknowledged by       Dr Olivares  · Appropriate medical records of the examination and treatment of the patient are provided at the time of transfer   [address] [facility], [address] _______  · Transfer will be performed by qualified personnel from    and appropriate transfer equipment as required, including the use of necessary and appropriate life support measures      Provider Certification: I have examined the patient and explained the following risks and benefits of being transferred/refusing transfer to the patient/family:  General risk, such as traffic hazards, adverse weather conditions, rough terrain or turbulence, possible failure of equipment (including vehicle or aircraft), or consequences of actions of persons outside the control of the transport personnel      Based on these reasonable risks and benefits to the patient and/or the unborn child(rama), and based upon the information available at the time of the patient’s examination, I certify that the medical benefits reasonably to be expected from the provision of appropriate medical treatments at another medical facility outweigh the increasing risks, if any, to the individual’s medical condition, and in the case of labor to the unborn child, from effecting the transfer      X____________________________________________ DATE 11/25/22        TIME_______      ORIGINAL - SEND TO MEDICAL RECORDS   COPY - SEND WITH PATIENT DURING TRANSFER

## 2022-11-25 NOTE — TRAUMA DOCUMENTATION
Belongings given to transport team  No signs of distress   Report called and given to [facility]

## 2022-11-26 ENCOUNTER — APPOINTMENT (INPATIENT)
Dept: RADIOLOGY | Facility: HOSPITAL | Age: 87
End: 2022-11-26

## 2022-11-26 PROBLEM — S22.41XA CLOSED FRACTURE OF MULTIPLE RIBS OF RIGHT SIDE: Status: ACTIVE | Noted: 2022-11-26

## 2022-11-26 PROBLEM — G89.11 ACUTE PAIN DUE TO TRAUMA: Status: ACTIVE | Noted: 2022-11-26

## 2022-11-26 PROBLEM — J94.2 HEMOTHORAX: Status: ACTIVE | Noted: 2022-11-26

## 2022-11-26 LAB
ANION GAP SERPL CALCULATED.3IONS-SCNC: 8 MMOL/L (ref 4–13)
APTT PPP: 30 SECONDS (ref 23–37)
BASOPHILS # BLD AUTO: 0.02 THOUSANDS/ÂΜL (ref 0–0.1)
BASOPHILS NFR BLD AUTO: 0 % (ref 0–1)
BUN SERPL-MCNC: 15 MG/DL (ref 5–25)
CA-I BLD-SCNC: 1.19 MMOL/L (ref 1.12–1.32)
CALCIUM SERPL-MCNC: 8.9 MG/DL (ref 8.3–10.1)
CHLORIDE SERPL-SCNC: 109 MMOL/L (ref 96–108)
CO2 SERPL-SCNC: 23 MMOL/L (ref 21–32)
CREAT SERPL-MCNC: 0.77 MG/DL (ref 0.6–1.3)
EOSINOPHIL # BLD AUTO: 0.03 THOUSAND/ÂΜL (ref 0–0.61)
EOSINOPHIL NFR BLD AUTO: 0 % (ref 0–6)
ERYTHROCYTE [DISTWIDTH] IN BLOOD BY AUTOMATED COUNT: 13.5 % (ref 11.6–15.1)
GFR SERPL CREATININE-BSD FRML MDRD: 80 ML/MIN/1.73SQ M
GLUCOSE SERPL-MCNC: 121 MG/DL (ref 65–140)
HCT VFR BLD AUTO: 34.5 % (ref 36.5–49.3)
HGB BLD-MCNC: 10.9 G/DL (ref 12–17)
HGB BLD-MCNC: 11.1 G/DL (ref 12–17)
IMM GRANULOCYTES # BLD AUTO: 0.03 THOUSAND/UL (ref 0–0.2)
IMM GRANULOCYTES NFR BLD AUTO: 0 % (ref 0–2)
INR PPP: 1.27 (ref 0.84–1.19)
LYMPHOCYTES # BLD AUTO: 0.99 THOUSANDS/ÂΜL (ref 0.6–4.47)
LYMPHOCYTES NFR BLD AUTO: 12 % (ref 14–44)
MAGNESIUM SERPL-MCNC: 2.1 MG/DL (ref 1.6–2.6)
MCH RBC QN AUTO: 30.6 PG (ref 26.8–34.3)
MCHC RBC AUTO-ENTMCNC: 31.6 G/DL (ref 31.4–37.4)
MCV RBC AUTO: 97 FL (ref 82–98)
MONOCYTES # BLD AUTO: 0.93 THOUSAND/ÂΜL (ref 0.17–1.22)
MONOCYTES NFR BLD AUTO: 11 % (ref 4–12)
NEUTROPHILS # BLD AUTO: 6.21 THOUSANDS/ÂΜL (ref 1.85–7.62)
NEUTS SEG NFR BLD AUTO: 77 % (ref 43–75)
NRBC BLD AUTO-RTO: 0 /100 WBCS
PHOSPHATE SERPL-MCNC: 2.7 MG/DL (ref 2.3–4.1)
PLATELET # BLD AUTO: 191 THOUSANDS/UL (ref 149–390)
PMV BLD AUTO: 11 FL (ref 8.9–12.7)
POTASSIUM SERPL-SCNC: 4 MMOL/L (ref 3.5–5.3)
PROTHROMBIN TIME: 16.1 SECONDS (ref 11.6–14.5)
RBC # BLD AUTO: 3.56 MILLION/UL (ref 3.88–5.62)
SODIUM SERPL-SCNC: 140 MMOL/L (ref 135–147)
WBC # BLD AUTO: 8.21 THOUSAND/UL (ref 4.31–10.16)

## 2022-11-26 RX ORDER — METOPROLOL TARTRATE 50 MG/1
50 TABLET, FILM COATED ORAL EVERY 12 HOURS SCHEDULED
Status: DISCONTINUED | OUTPATIENT
Start: 2022-11-26 | End: 2022-11-28

## 2022-11-26 RX ORDER — PROPOFOL 10 MG/ML
INJECTION, EMULSION INTRAVENOUS
Status: DISPENSED
Start: 2022-11-26 | End: 2022-11-26

## 2022-11-26 RX ADMIN — ACETAMINOPHEN 650 MG: 325 TABLET ORAL at 17:02

## 2022-11-26 RX ADMIN — GABAPENTIN 100 MG: 300 CAPSULE ORAL at 09:21

## 2022-11-26 RX ADMIN — PRAVASTATIN SODIUM 40 MG: 40 TABLET ORAL at 09:21

## 2022-11-26 RX ADMIN — GABAPENTIN 100 MG: 300 CAPSULE ORAL at 17:00

## 2022-11-26 RX ADMIN — MENTHOL, METHYL SALICYLATE: 10; 15 CREAM TOPICAL at 09:23

## 2022-11-26 RX ADMIN — METOPROLOL TARTRATE 50 MG: 50 TABLET, FILM COATED ORAL at 21:15

## 2022-11-26 RX ADMIN — ACETAMINOPHEN 650 MG: 325 TABLET ORAL at 05:23

## 2022-11-26 RX ADMIN — GABAPENTIN 100 MG: 300 CAPSULE ORAL at 21:15

## 2022-11-26 RX ADMIN — ACETAMINOPHEN 650 MG: 325 TABLET ORAL at 13:00

## 2022-11-26 RX ADMIN — SENNOSIDES AND DOCUSATE SODIUM 1 TABLET: 8.6; 5 TABLET ORAL at 09:21

## 2022-11-26 RX ADMIN — SENNOSIDES AND DOCUSATE SODIUM 1 TABLET: 8.6; 5 TABLET ORAL at 17:02

## 2022-11-26 RX ADMIN — METOPROLOL TARTRATE 50 MG: 50 TABLET, FILM COATED ORAL at 09:21

## 2022-11-26 RX ADMIN — METHOCARBAMOL 500 MG: 500 TABLET ORAL at 09:21

## 2022-11-26 RX ADMIN — METHOCARBAMOL 500 MG: 500 TABLET ORAL at 17:03

## 2022-11-26 RX ADMIN — Medication 100 MG: at 09:21

## 2022-11-26 RX ADMIN — ACETAMINOPHEN 650 MG: 325 TABLET ORAL at 23:14

## 2022-11-26 NOTE — QUICK NOTE
I went to see the patient at bedside after he was transferred from the unit to room 803  The patient states that he is doing well overall  He states that he has minimal right-sided chest pain  He denies having fevers, shortness of breath, chills, nausea, vomiting, diarrhea  He states that he has been tolerating a PO diet without difficulty  He has been urinating without difficulty  States that he has had bowel movements within the past 24 hours  At this point time has no complaints  Patient is hemodynamically stable and afebrile  Early a m  labs are within normal limits  On physical exam patient has tenderness to palpation around the right chest tube insertion site  The chest tube is currently set to suction and has no air leak  Output is bloody  All questions were appropriately answered  We will continue to monitor the patient

## 2022-11-26 NOTE — ASSESSMENT & PLAN NOTE
· Continue home lopressor (though converted to shorter acting metoprolol 50 mg BID   · Hold home losartan (Given possible role for toradol for acute pain)

## 2022-11-26 NOTE — PROGRESS NOTES
Wilber Palacios 10/21/1933, 80 y o  male MRN: [id number]  Unit/Bed#: [facility] 803-01 Encounter: [id number]  Primary Care Provider: Starr Jesus DO   Date and time admitted to hospital: 11/25/2022  5:46 PM    * Hemothorax  Assessment & Plan  Right Hemothorax  11/25 S/P Thoracostomy tube placement, initially 500 cc drainage  - CT to suction day 1   - 100 cc's output over night SS   - Daily CXR's, am CXR 11/27 pending  - recheck Hemoglobin at 4 pm and consider restarting eliquis 11/27    Closed fracture of multiple ribs of right side  Assessment & Plan  Right sided rib fractures 10 mildly displaced, 11 nondisplaced  - APS Consult appreciated  - Multimodal pain control  - Respiratory Protocol  - Encourage IS  - PT/OT  - Geriatric Consult    Acute pain due to trauma  Assessment & Plan  APS consulted  - Patient on multimodal pain regime  - Not a candidate for Block/Epidural due to timing of last Eliquis dose    Hypertension  Assessment & Plan  · Continue home lopressor (though converted to shorter acting metoprolol 50 mg BID   · Hold home losartan (Given possible role for toradol for acute pain)      Cardiomyopathy ([facility] [address] )  Assessment & Plan  · History of CMP with last ECHO on 8/21 showing EF 55-60% grade 1 DD, mildly decreased LV filling internal cavity size, mild AI, Moderate TR, Moderate MR with MVP  · Monitor fluid status closely  · Continue PO metoprolol    Atrial fibrillation (HCC)  Assessment & Plan  Home Eliquis on hold  - Continue Metoprolol  - Patient take       Reason for ICU admission:   Rib fracture, hemothorax on eliquis, high PIC score    Active problems:   Principal Problem:    Hemothorax  Active Problems:    Closed fracture of multiple ribs of right side    Atrial fibrillation ([address] )    Cardiomyopathy ([facility] [address] )    Hyperlipidemia    Hypertension    Acute pain due to trauma  Resolved Problems:    * No resolved hospital problems   *      Consultants:   YOSVANY    History of Present IllnessSummary of clinical course:   "80 y o  male who presents with R flank pain and dyspnea  Fall yesterday  Patient states he lost balance while working in his garage falling and striking his right flank on a   Denies head strike, loss of consciousness, or neck pain  Patient reports immediate pain on the affected side the last throughout the day  The following day he noted dyspnea at rest and proceeded to the ER  Denies any other injuries  Patient is compliant with daily Eliquis for atrial fibrillation      On trauma evaluation patient noted to have right-sided hemothorax and multiple closed rib fractures  Thoracostomy tube inserted at bedside with 1 L sanguinous fluid initial output"    Patient was improvement in his incentive spirometry with addition of multimodal pain control  He had a 1 g drop in his hemoglobin, will put in for 16:00 hemoglobin, if stable, consider restarting anticoagulation tonight versus tomorrow, 11/27/2022    Holding losartan and Cardura but restarted Lopressor, converting from long-acting to short-acting while in hospital    Recent or scheduled procedures:   1600 CBC    Outstanding/pending diagnostics:   Restarting anticoagulation  Chest x-ray 11-27  Chest tube Plan    Cultures:   N/A       Mobilization Plan:   Out of bed per PT OT    Nutrition Plan:   Regular diet    Invasive Devices Review  Invasive Devices     Peripheral Intravenous Line  Duration           Peripheral IV 11/25/22 Right;Ventral (anterior) Forearm 1 day    Peripheral IV 11/26/22 Left;Dorsal (posterior) Forearm <1 day          Drain  Duration           Chest Tube Right <1 day                Rationale for remaining devices:  Continue chest tube    VTE Pharmacologic Prophylaxis: Pharmacologic VTE Prophylaxis contraindicated due to hemothorax, consider restarting tonight versus tomorrow 11-27  VTE Mechanical Prophylaxis: sequential compression device    Discharge Plan:   Patient should be ready for discharge once chest tube out and PTOT make recommendations    Initial Physical Therapy Recommendations:  Pending  Initial Occupational Therapy Recommendations:  Pending  Initial /Plan:  Pending    Home medications that are not reordered and reason why:   Losartan-possible bleeding, possible role for Toradol for pain control    Cardura-possible bleeding    Eliquis-ongoing bleeding    Spoke with [place] RON Rivera regarding transfer  Please contact critical care via [employer] with any questions or concerns  Portions of the record may have been created with voice recognition software  Occasional wrong word or "sound a like" substitutions may have occurred due to the inherent limitations of voice recognition software  Read the chart carefully and recognize, using context, where substitutions have occurred  -

## 2022-11-26 NOTE — PLAN OF CARE
Problem: Potential for Falls  Goal: Patient will remain free of falls  Description: INTERVENTIONS:  - Educate patient/family on patient safety including physical limitations  - Instruct patient to call for assistance with activity   - Consult OT/PT to assist with strengthening/mobility   - Keep Call bell within reach  - Keep bed low and locked with side rails adjusted as appropriate  - Keep care items and personal belongings within reach  - Initiate and maintain comfort rounds  - Make Fall Risk Sign visible to staff  - Apply yellow socks and bracelet for high fall risk patients  - Consider moving patient to room near nurses station  Outcome: Progressing     Problem: MOBILITY - ADULT  Goal: Maintain or return to baseline ADL function  Description: INTERVENTIONS:  -  Assess patient's ability to carry out ADLs; assess patient's baseline for ADL function and identify physical deficits which impact ability to perform ADLs (bathing, care of mouth/teeth, toileting, grooming, dressing, etc )  - Assess/evaluate cause of self-care deficits   - Assess range of motion  - Assess patient's mobility; develop plan if impaired  - Assess patient's need for assistive devices and provide as appropriate  - Encourage maximum independence but intervene and supervise when necessary  - Involve family in performance of ADLs  - Assess for home care needs following discharge   - Consider OT consult to assist with ADL evaluation and planning for discharge  - Provide patient education as appropriate  Outcome: Progressing  Goal: Maintains/Returns to pre admission functional level  Description: INTERVENTIONS:  - Perform BMAT or MOVE assessment daily    - Set and communicate daily mobility goal to care team and patient/family/caregiver     - Collaborate with rehabilitation services on mobility goals if consulted  - Out of bed for toileting  - Record patient progress and toleration of activity level   Outcome: Progressing     Problem: PAIN - ADULT  Goal: Verbalizes/displays adequate comfort level or baseline comfort level  Description: Interventions:  - Encourage patient to monitor pain and request assistance  - Assess pain using appropriate pain scale  - Administer analgesics based on type and severity of pain and evaluate response  - Implement non-pharmacological measures as appropriate and evaluate response  - Consider cultural and social influences on pain and pain management  - Notify physician/advanced practitioner if interventions unsuccessful or patient reports new pain  Outcome: Progressing     Problem: INFECTION - ADULT  Goal: Absence or prevention of progression during hospitalization  Description: INTERVENTIONS:  - Assess and monitor for signs and symptoms of infection  - Monitor lab/diagnostic results  - Monitor all insertion sites, i e  indwelling lines, tubes, and drains  - Monitor endotracheal if appropriate and nasal secretions for changes in amount and color  - [place] appropriate cooling/warming therapies per order  - Administer medications as ordered  - Instruct and encourage patient and family to use good hand hygiene technique  - Identify and instruct in appropriate isolation precautions for identified infection/condition  Outcome: Progressing  Goal: Absence of fever/infection during neutropenic period  Description: INTERVENTIONS:  - Monitor WBC    Outcome: Progressing     Problem: SAFETY ADULT  Goal: Patient will remain free of falls  Description: INTERVENTIONS:  - Educate patient/family on patient safety including physical limitations  - Instruct patient to call for assistance with activity   - Consult OT/PT to assist with strengthening/mobility   - Keep Call bell within reach  - Keep bed low and locked with side rails adjusted as appropriate  - Keep care items and personal belongings within reach  - Initiate and maintain comfort rounds  - Apply yellow socks and bracelet for high fall risk patients  - Consider moving patient to room near nurses station  Outcome: Progressing  Goal: Maintain or return to baseline ADL function  Description: INTERVENTIONS:  -  Assess patient's ability to carry out ADLs; assess patient's baseline for ADL function and identify physical deficits which impact ability to perform ADLs (bathing, care of mouth/teeth, toileting, grooming, dressing, etc )  - Assess/evaluate cause of self-care deficits   - Assess range of motion  - Assess patient's mobility; develop plan if impaired  - Assess patient's need for assistive devices and provide as appropriate  - Encourage maximum independence but intervene and supervise when necessary  - Involve family in performance of ADLs  - Assess for home care needs following discharge   - Consider OT consult to assist with ADL evaluation and planning for discharge  - Provide patient education as appropriate  Outcome: Progressing  Goal: Maintains/Returns to pre admission functional level  Description: INTERVENTIONS:  - Perform BMAT or MOVE assessment daily    - Set and communicate daily mobility goal to care team and patient/family/caregiver     - Collaborate with rehabilitation services on mobility goals if consulted  - Out of bed for toileting  - Record patient progress and toleration of activity level   Outcome: Progressing     Problem: DISCHARGE PLANNING  Goal: Discharge to home or other facility with appropriate resources  Description: INTERVENTIONS:  - Identify barriers to discharge w/patient and caregiver  - Arrange for needed discharge resources and transportation as appropriate  - Identify discharge learning needs (meds, wound care, etc )  - Arrange for interpretive services to assist at discharge as needed  - Refer to Case Management Department for coordinating discharge planning if the patient needs post-hospital services based on physician/advanced practitioner order or complex needs related to functional status, cognitive ability, or social support system  Outcome: Progressing     Problem: Knowledge Deficit  Goal: Patient/family/caregiver demonstrates understanding of disease process, treatment plan, medications, and discharge instructions  Description: Complete learning assessment and assess knowledge base    Interventions:  - Provide teaching at level of understanding  - Provide teaching via preferred learning methods  Outcome: Progressing     Problem: CARDIOVASCULAR - ADULT  Goal: Maintains optimal cardiac output and hemodynamic stability  Description: INTERVENTIONS:  - Monitor I/O, vital signs and rhythm  - Monitor for S/S and trends of decreased cardiac output  - Administer and titrate ordered vasoactive medications to optimize hemodynamic stability  - Assess quality of pulses, skin color and temperature  - Assess for signs of decreased coronary artery perfusion  - Instruct patient to report change in severity of symptoms  Outcome: Progressing  Goal: Absence of cardiac dysrhythmias or at baseline rhythm  Description: INTERVENTIONS:  - Continuous cardiac monitoring, vital signs, obtain 12 lead EKG if ordered  - Administer antiarrhythmic and heart rate control medications as ordered  - Monitor electrolytes and administer replacement therapy as ordered  Outcome: Progressing     Problem: RESPIRATORY - ADULT  Goal: Achieves optimal ventilation and oxygenation  Description: INTERVENTIONS:  - Assess for changes in respiratory status  - Assess for changes in mentation and behavior  - Position to facilitate oxygenation and minimize respiratory effort  - Oxygen administered by appropriate delivery if ordered  - Initiate smoking cessation education as indicated  - Encourage broncho-pulmonary hygiene including cough, deep breathe, Incentive Spirometry  - Assess the need for suctioning and aspirate as needed  - Assess and instruct to report SOB or any respiratory difficulty  - Respiratory Therapy support as indicated  Outcome: Progressing     Problem: MUSCULOSKELETAL - ADULT  Goal: Maintain or return mobility to safest level of function  Description: INTERVENTIONS:  - Assess patient's ability to carry out ADLs; assess patient's baseline for ADL function and identify physical deficits which impact ability to perform ADLs (bathing, care of mouth/teeth, toileting, grooming, dressing, etc )  - Assess/evaluate cause of self-care deficits   - Assess range of motion  - Assess patient's mobility  - Assess patient's need for assistive devices and provide as appropriate  - Encourage maximum independence but intervene and supervise when necessary  - Involve family in performance of ADLs  - Assess for home care needs following discharge   - Consider OT consult to assist with ADL evaluation and planning for discharge  - Provide patient education as appropriate  Outcome: Progressing  Goal: Maintain proper alignment of affected body part  Description: INTERVENTIONS:  - Support, maintain and protect limb and body alignment  - Provide patient/ family with appropriate education  Outcome: Progressing     Problem: Prexisting or High Potential for Compromised Skin Integrity  Goal: Skin integrity is maintained or improved  Description: INTERVENTIONS:  - Identify patients at risk for skin breakdown  - Assess and monitor skin integrity  - Assess and monitor nutrition and hydration status  - Monitor labs   - Assess for incontinence   - Turn and reposition patient  - Assist with mobility/ambulation  - Relieve pressure over bony prominences  - Avoid friction and shearing  - Provide appropriate hygiene as needed including keeping skin clean and dry  - Evaluate need for skin moisturizer/barrier cream  - Collaborate with interdisciplinary team   - Patient/family teaching  - Consider wound care consult   Outcome: Progressing

## 2022-11-26 NOTE — ASSESSMENT & PLAN NOTE
Right Hemothorax  11/25 S/P Thoracostomy tube placement, initially 500 cc drainage  - CT to suction day 1   - 100 cc's output over night SS   - Daily CXR's, am CXR 11/27 shows slight retraction of right CT sidehole, but still in good placement   Small Right PTX, Small right Pleural effusion  - Continue CT to Suction today  - CXR in am 11/28

## 2022-11-26 NOTE — ASSESSMENT & PLAN NOTE
Right sided rib fractures 10 mildly displaced, 11 nondisplaced  - APS Consult appreciated  - Multimodal pain control  - Respiratory Protocol  - Encourage IS  - PT/OT  - Geriatric Consult

## 2022-11-26 NOTE — H&P
[facility] MRN: [id number]  Unit/Bed#: ICU 05 Encounter: [id number]      -------------------------------------------------------------------------------------------------------------  Chief Complaint: flank pain, dyspnea    History of Present Illness   HX and PE limited by: none  Leonila Tse is a 80 y o  male who presents with R flank pain and dyspnea  Fall yesterday  Patient states he lost balance while working in his garage falling and striking his right flank on a   Denies head strike, loss of consciousness, or neck pain  Patient reports immediate pain on the affected side the last throughout the day  The following day he noted dyspnea at rest and proceeded to the ER  Denies any other injuries  Patient is compliant with daily Eliquis for atrial fibrillation  On trauma evaluation patient noted to have right-sided hemothorax and multiple closed rib fractures  Thoracostomy tube inserted at bedside with 1 L sanguinous fluid initial output  CT head and CT C-spine negative for acute injuries  On my initial evaluation patient was comfortable  Reports dull right flank pain exacerbated by movement  Denies dyspnea  History obtained from the patient   -------------------------------------------------------------------------------------------------------------  Assessment and Plan:    Neuro:   • Diagnosis:  Analgesia  o Plan:  Scheduled Tylenol, Robaxin, gabapentin, lidocaine patch, Abram-Banda  o P r n   Oxycodone 5, 10  o APS consult      CV:   • Diagnosis:  Atrial fibrillation s/p pacemaker  o Plan: hold home eliquis, continue statin, metoprolol  • Diagnosis: HTN  o Plan: continue home losartan      Pulm:  • Diagnosis:  Hemothorax, right, s/p thoracostomy tube  o Plan:  Daily chest x-ray  o Monitor chest tube output      GI:   • Diagnosis: no active issues, bowel regimen      :   • Diagnosis: hx of urinary retention  o Plan: continue home doxazosin      F/E/N:   • Plan:  75 ml/hr isolyte maintenance fluids, replete electrolytes as needed, NPO      Heme/Onc:   • Diagnosis: DVT prophylaxis  o Plan: SCDs      Endo:   • Diagnosis: no active issues      ID:   • Diagnosis: no active issues  o Plan: daily CBC      MSK/Skin:   • Diagnosis:  Acute, mildly displaced fracture of right 10th rib posteriorly and nondisplaced fracture of right 11th rib posteriorly  o Plan: Rib fracture protocol  o Incentive spirometry  o Analgesia    Lines: R thoracostomy tube placed 11/25  Peripheral IV, right      Disposition: Admit to Stepdown Level 1  Code Status: Level 1 - Full Code  --------------------------------------------------------------------------------------------------------------  Review of Systems   Constitutional: Negative for chills and fever  HENT: Negative for ear pain and sore throat  Eyes: Negative for pain and visual disturbance  Respiratory: Positive for shortness of breath  Negative for cough  Cardiovascular: Negative for chest pain and palpitations  Gastrointestinal: Negative for abdominal pain and vomiting  Genitourinary: Negative for dysuria and hematuria  Musculoskeletal: Positive for back pain  Negative for arthralgias  Skin: Negative for color change and rash  Neurological: Negative for seizures and syncope  All other systems reviewed and are negative  A 12-point, complete review of systems was reviewed and negative except as stated above     Physical Exam  Vitals and nursing note reviewed  Constitutional:       General: He is not in acute distress  Appearance: Normal appearance  He is well-developed and normal weight  He is not ill-appearing, toxic-appearing or diaphoretic  HENT:      Head: Normocephalic and atraumatic        Right Ear: External ear normal       Left Ear: External ear normal       Nose: Nose normal       Mouth/Throat:      Mouth: Mucous membranes are moist    Eyes:      General:         Right eye: No discharge  Left eye: No discharge  Extraocular Movements: Extraocular movements intact  Conjunctiva/sclera: Conjunctivae normal       Pupils: Pupils are equal, round, and reactive to light  Cardiovascular:      Rate and Rhythm: Normal rate and regular rhythm  Pulses: Normal pulses  Heart sounds: No murmur heard  Pulmonary:      Effort: Pulmonary effort is normal  No respiratory distress  Breath sounds: Normal breath sounds  No stridor  No wheezing or rales  Comments: Chest tube in place right thorax  1 L sanguinous fluid in atrium  Chest:      Chest wall: Tenderness present  Abdominal:      General: Abdomen is flat  Bowel sounds are normal  There is no distension  Palpations: Abdomen is soft  Tenderness: There is no abdominal tenderness  There is no guarding  Musculoskeletal:         General: No swelling, tenderness or signs of injury  Normal range of motion  Cervical back: Normal range of motion and neck supple  No rigidity or tenderness  Skin:     General: Skin is warm and dry  Capillary Refill: Capillary refill takes less than 2 seconds  Neurological:      Mental Status: He is alert and oriented to person, place, and time  Psychiatric:         Mood and Affect: Mood normal          Behavior: Behavior normal        --------------------------------------------------------------------------------------------------------------  Vitals:   Vitals:    11/25/22 1920 11/25/22 1930 11/25/22 1930 11/25/22 2000   BP: 136/73 154/73 154/73 149/81   BP Location: Right arm   Right arm   Pulse: 74 72 72 68   Resp:  20 21 17   Temp:       TempSrc:       SpO2: 97% 96% 96% 96%   Weight:         Temp  Min: 97 9 °F (36 6 °C)  Max: 98 °F (36 7 °C)        Body mass index is 29 05 kg/m²    N/A    Laboratory and Diagnostics:  Results from last 7 days   Lab Units 11/25/22  1236   WBC Thousand/uL 9 39   HEMOGLOBIN g/dL 12 0   HEMATOCRIT % 37 6   PLATELETS Thousands/uL 188   NEUTROS PCT % 68   MONOS PCT % 13*     Results from last 7 days   Lab Units 11/25/22  1236   SODIUM mmol/L 140   POTASSIUM mmol/L 4 0   CHLORIDE mmol/L 105   CO2 mmol/L 26   ANION GAP mmol/L 9   BUN mg/dL 20   CREATININE mg/dL 0 94   CALCIUM mg/dL 9 4   GLUCOSE RANDOM mg/dL 118          Results from last 7 days   Lab Units 11/25/22  1236   INR  1 26*   PTT seconds 31              ABG:    VBG:          Micro:        EKG: a fib on tele  Imaging: I have personally reviewed pertinent reports        Historical Information   Past Medical History:   Diagnosis Date   • Cataract     resolved: 10/19/15   • Other microscopic hematuria     resolved: 1/5/18     Past Surgical History:   Procedure Laterality Date   • CARDIAC PACEMAKER PLACEMENT      Dual-Chamber   • HERNIA REPAIR     • KNEE ARTHROSCOPY       Social History   Social History     Substance and Sexual Activity   Alcohol Use Yes    Comment: social     Social History     Substance and Sexual Activity   Drug Use Never     Social History     Tobacco Use   Smoking Status Never   Smokeless Tobacco Never     Exercise History:   Family History:   Family History   Problem Relation Age of Onset   • No Known Problems Mother    • No Known Problems Father      Family history unknown and I have reviewed this patient's family history and commented on sigificant items within the HPI      Medications:  Current Facility-Administered Medications   Medication Dose Route Frequency   • acetaminophen (TYLENOL) tablet 650 mg  650 mg Oral Q6H [address]   • co-enzyme Q-10 capsule 100 mg  100 mg Oral Daily   • [START ON 11/26/2022] doxazosin (CARDURA) tablet 8 mg  8 mg Oral Daily   • gabapentin (NEURONTIN) capsule 100 mg  100 mg Oral TID   • lidocaine (LIDODERM) 5 % patch 1 patch  1 patch Topical Once   • [START ON 11/26/2022] losartan (COZAAR) tablet 50 mg  50 mg Oral Daily   • menthol-methyl salicylate (BENGAY) 70-51 % cream   Apply externally TID   • methocarbamol (ROBAXIN) tablet 500 mg  500 mg Oral BID   • [START ON 11/26/2022] metoprolol succinate (TOPROL-XL) 24 hr tablet 100 mg  100 mg Oral Daily   • multi-electrolyte (PLASMALYTE-A/ISOLYTE-S PH 7 4) IV solution  75 mL/hr Intravenous Continuous   • oxyCODONE (ROXICODONE) immediate release tablet 10 mg  10 mg Oral Q6H PRN   • oxyCODONE (ROXICODONE) IR tablet 5 mg  5 mg Oral Q6H PRN   • polyethylene glycol (MIRALAX) packet 17 g  17 g Oral Daily PRN   • [START ON 11/26/2022] pravastatin (PRAVACHOL) tablet 40 mg  40 mg Oral Daily   • senna-docusate sodium (SENOKOT S) 8 6-50 mg per tablet 1 tablet  1 tablet Oral BID     Home medications:  Prior to Admission Medications   Prescriptions Last Dose Informant Patient Reported? Taking?    Bee Pollen 500 MG CHEW   Yes No   Sig: Chew 500 mg   Calcium Carbonate (CALCIUM 600 PO)   Yes No   Sig: Take 600 mg by mouth   Coenzyme Q10 (Co Q 10) 100 MG CAPS   Yes No   Sig: Take 100 mg by mouth   Magnesium 250 MG TABS   Yes No   Sig: Take 250 mg by mouth   Zinc 50 MG TABS   Yes No   Sig: Take 50 mg by mouth   apixaban (ELIQUIS) 5 mg   Yes No   Sig: Take 5 mg by mouth 2 (two) times a day   doxazosin (CARDURA) 8 MG tablet   Yes No   Sig: Take 1 tablet by mouth daily   losartan (COZAAR) 50 mg tablet   Yes No   Sig: Take 1 tablet by mouth daily   metoprolol succinate (TOPROL-XL) 50 mg 24 hr tablet   Yes No   Sig: Take 2 tablets by mouth daily   selenium 200 mcg   Yes No   Sig: Take 200 mcg by mouth daily   simvastatin (ZOCOR) 20 mg tablet   Yes No   Sig: Take by mouth daily      Facility-Administered Medications: None     Allergies:  No Known Allergies  ------------------------------------------------------------------------------------------------------------  Advance Directive and Living Will:      Power of :    POLST:    ------------------------------------------------------------------------------------------------------------  Anticipated Length of Stay is > 2 midnights    Care Time Delivered:   No Critical Care time spent       Kati Gutiérrez MD, PGY-2        Portions of the record may have been created with voice recognition software  Occasional wrong word or "sound a like" substitutions may have occurred due to the inherent limitations of voice recognition software    Read the chart carefully and recognize, using context, where substitutions have occurred

## 2022-11-26 NOTE — CASE MANAGEMENT
Case Management Assessment & Discharge Planning Note    Patient name Amna Burgess  Location [facility] 115-99 MRN [id number]  : 10/21/1933 Date 2022       Current Admission Date: 2022  Current Admission Diagnosis:Hemothorax   Patient Active Problem List    Diagnosis Date Noted   • Hemothorax 2022   • Acute pain due to trauma 2022   • Closed fracture of multiple ribs of right side 2022   • COVID-19 2022   • Bleeding from varicose vein 2021   • Hematoma 2018   • AV block 10/04/2017   • Other microscopic hematuria 2017   • Bilateral impacted cerumen 2017   • Age-related macular degeneration 10/19/2016   • Bilateral dry eyes 10/19/2016   • Cataract 10/19/2015   • Mitral prolapse 10/19/2015   • Carcinoma in situ of skin 2015   • Disorder of skin 2014   • Cardiomyopathy ([address] ) 2013   • Hypertension 2013   • Hearing loss 2012   • Aortic regurgitation 2012   • Atrial fibrillation ([address] ) 2012   • Hyperlipidemia 2012   • Benign prostatic hyperplasia 2012      LOS (days): 1  Geometric Mean LOS (GMLOS) (days):   Days to GMLOS:     OBJECTIVE:    Risk of Unplanned Readmission Score: 13 93         Current admission status: Inpatient       Preferred Pharmacy:   [facility] [address] [facility], [address]  [address] [place] [address]  Phone: [phone] Fax: [phone]    [facility] [address], [address]   [address] [facility]  [facility]  Phone: [phone] Fax: [phone]    Primary Care Provider: Susana De Leon DO    Primary Insurance: [facility]  Secondary Insurance:     ASSESSMENT:  Cynthia Erwin Proxies    There are no active Health Care Proxies on file         Advance Directives  Does patient have a Health Care POA?: Yes  Does patient have Advance Directives?: Yes  Advance Directives: Living will  Primary Contact: children Royer Gaspar delfin Almanzar              Patient Information  Admitted from[de-identified] Home  Mental Status: Alert  During Assessment patient was accompanied by: Son  Assessment information provided by[de-identified] Patient  Primary Caregiver: Self  Support Systems: Son, Daughter, Family members  Home entry access options   Select all that apply : Stairs  Number of steps to enter home : One Flight  Do the steps have railings?: Yes  Type of Current Residence: [place]  In the last 12 months, was there a time when you were not able to pay the mortgage or rent on time?: No  In the last 12 months, how many places have you lived?: 1  In the last 12 months, was there a time when you did not have a steady place to sleep or slept in a shelter (including now)?: No  Living Arrangements: Lives Alone    Activities of Daily Living Prior to Admission  Functional Status: Independent  Completes ADLs independently?: Yes  Ambulates independently?: Yes  Does patient use assisted devices?: No  Does patient currently own DME?: No  Does patient have a history of Outpatient Therapy (PT/OT)?: No  Does the patient have a history of Short-Term Rehab?: No  Does patient have a history of HHC?: No  Does patient currently have [address]?: No         Patient Information Continued  Income Source: Employed  Does patient have prescription coverage?: Yes  Within the past 12 months, you worried that your food would run out before you got the money to buy more : Never true  Within the past 12 months, the food you bought just didn't last and you didn't have money to get more : Never true  Does patient receive dialysis treatments?: No  Does patient have a history of substance abuse?: No  Does patient have a history of Mental Health Diagnosis?: No         Means of Transportation  Means of Transport to Appts[de-identified] Drives Self  In the past 12 months, has lack of transportation kept you from medical appointments or from getting medications?: No  In the past 12 months, has lack of transportation kept you from meetings, work, or from getting things needed for daily living?: No        DISCHARGE DETAILS:    Discharge planning discussed with[de-identified] pt and son  Freedom of Choice: Yes                   Contacts  Patient Contacts: son Ruth Poole or karla Almanzar  Relationship to Patient[de-identified] Family  Contact Method: Phone  Phone Number: Karime Griffin  Reason/Outcome: Continuity of Care, Emergency Contact, Discharge Planning                        Treatment Team Recommendation: Home                                            Additional Comments: resides alone, raised ranch, works full time     CM reviewed d/c planning process including the following: identifying help at home, patient preference for d/c planning needs, Discharge Lounge, [facility] Meds to Bed program, availability of treatment team to discuss questions or concerns patient and/or family may have regarding understanding medications and recognizing signs and symptoms once discharged  CM also encouraged patient to follow up with all recommended appointments after discharge  Patient advised of importance for patient and family to participate in managing patient’s medical well being  Patient/caregiver received discharge checklist   Content reviewed  Patient/caregiver encouraged to participate in discharge plan of care prior to discharge home

## 2022-11-26 NOTE — H&P
H&P - Trauma   Burgess Motley 80 y o  male MRN: [id number]  Unit/Bed#: ICU 05 Encounter: [id number]    Trauma Alert: Other Transfer   Model of Arrival: Ambulance    Trauma Team: Attending To and Residents OLIVIA  Consultants:     None     Assessment/Plan   Active Problems / Assessment:   R 10 and 11 rib fractures  Hemothorax     Plan:   Chest tube  Admit SD1 given PIC score  AM CXR  Rib fx protocol  Rest of care per ICU team    History of Present Illness     Chief Complaint: Right chest/rib pain  Mechanism:Fall     HPI:    Burgess Motley is a 80 y o  male, on eliquis, who presents as a trauma transfer from [facility]  Patient states that 2 days ago he was loading a snowblower into the back of his truck when he accidentally fell  He states he ended up landing on the  with his right lateral/posterior ribs  He initially felt fine, but today developed SOB prompting visit to ED  [facility] scan performed at [facility] showed acute, mildly displaced fracture of right 10th rib posteriorly and nondisplaced fracture of right 11th rib posteriorly, as well as a probable hemothorax  CT also incidentally revealed ascending thoracic aortic aneurysm  Patient then [facility] for further management    Review of Systems   Respiratory: Positive for shortness of breath  Cardiovascular: Positive for chest pain  Gastrointestinal: Negative for nausea and vomiting  Neurological: Negative for dizziness and headaches  All other systems reviewed and are negative  12-point, complete review of systems was reviewed and negative except as stated above       Historical Information     Past Medical History:   Diagnosis Date   • Cataract     resolved: 10/19/15   • Other microscopic hematuria     resolved: 1/5/18     Past Surgical History:   Procedure Laterality Date   • CARDIAC PACEMAKER PLACEMENT      Dual-Chamber   • HERNIA REPAIR     • KNEE ARTHROSCOPY          Social History     Tobacco Use   • Smoking status: Never   • Smokeless tobacco: Never   Vaping Use   • Vaping Use: Never used   Substance Use Topics   • Alcohol use: Yes     Comment: social   • Drug use: Never     Immunization History   Administered Date(s) Administered   • COVID-19 MODERNA VACC 0 5 ML IM 01/30/2021, 02/25/2021, 11/23/2021   • INFLUENZA 11/13/2012, 08/17/2015, 11/02/2016, 11/03/2017, 10/18/2018   • Influenza Split High Dose Preservative Free IM 11/02/2016   • Influenza, seasonal, injectable 10/24/2013, 08/17/2015   • Pneumococcal Polysaccharide PPV23 10/18/2003, 05/06/2011   • influenza, trivalent, adjuvanted 08/29/2019     Last Tetanus: Unkown  Family History: Non-contributory     Meds/Allergies   all current active meds have been reviewed   No Known Allergies    Objective   Initial Vitals:   Temperature: 98 °F (36 7 °C) (11/25/22 1750)  Pulse: 77 (11/25/22 1750)  Respirations: 20 (11/25/22 1750)  Blood Pressure: 157/79 (11/25/22 1750)    Primary Survey:   Airway:        Status: patent;        Pre-hospital Interventions: none        Hospital Interventions: none  Breathing:        Pre-hospital Interventions: none       Effort: normal       Right breath sounds: normal       Left breath sounds: normal  Circulation:        Rhythm: regular       Rate: regular   Right Pulses Left Pulses    R radial: 2+    R pedal: 2+     L radial: 2+    L pedal: 2+       Disability:        GCS: Eye: 4; Verbal: 5 Motor: 6 Total: 15       Right Pupil:       Left Pupil:     R Motor Strength L Motor Strength    R : 5/5   L : 5/5            Exposure:       Completed: Yes      Secondary Survey:  Physical Exam  Vitals and nursing note reviewed  Constitutional:       Appearance: Normal appearance  He is not ill-appearing or toxic-appearing  HENT:      Head: Normocephalic and atraumatic        Right Ear: External ear normal       Left Ear: External ear normal       Nose: Nose normal       Mouth/Throat:      Mouth: Mucous membranes are moist    Eyes:      General: No scleral icterus  Right eye: No discharge  Left eye: No discharge  Cardiovascular:      Rate and Rhythm: Normal rate and regular rhythm  Pulses: Normal pulses  Heart sounds: Normal heart sounds  No murmur heard  No friction rub  No gallop  Pulmonary:      Effort: Pulmonary effort is normal  No respiratory distress  Breath sounds: Normal breath sounds  Chest:      Chest wall: Tenderness present  Abdominal:      Palpations: Abdomen is soft  Tenderness: There is no abdominal tenderness  Musculoskeletal:         General: No tenderness, deformity or signs of injury  Normal range of motion  Cervical back: Normal range of motion and neck supple  No tenderness  Right lower leg: No edema  Left lower leg: No edema  Skin:     General: Skin is warm and dry  Capillary Refill: Capillary refill takes less than 2 seconds  Neurological:      General: No focal deficit present  Mental Status: He is alert and oriented to person, place, and time  Psychiatric:         Mood and Affect: Mood normal          Behavior: Behavior normal          Invasive Devices     Peripheral Intravenous Line  Duration           Peripheral IV 11/25/22 Right;Ventral (anterior) Forearm <1 day          Drain  Duration           Chest Tube Right <1 day              Lab Results: Results: I have personally reviewed all pertinent laboratory/tests results    Imaging Results: I have personally reviewed pertinent reports      Chest Xray(s): positive for acute findings: Small right pleural effusion and right lower lung opacity   FAST exam(s): N/A   CT Scan(s): positive for acute findings: R 10+11 rib fx, hemothorax, thoracic aortic aneurysm   Additional Xray(s): N/A     Other Studies: N/A    Code Status: Level 1 - Full Code  Advance Directive and Living Will:      Power of :    POLST:    I have spent 30 minutes with Patient  today in which greater than 50% of this time was spent in counseling/coordination of care regarding Diagnostic results, Risks and benefits of tx options, Risk factor reductions and Impressions

## 2022-11-26 NOTE — PLAN OF CARE
Problem: Potential for Falls  Goal: Patient will remain free of falls  Description: INTERVENTIONS:  - Educate patient/family on patient safety including physical limitations  - Instruct patient to call for assistance with activity   - Consult OT/PT to assist with strengthening/mobility   - Keep Call bell within reach  - Keep bed low and locked with side rails adjusted as appropriate  - Keep care items and personal belongings within reach  - Initiate and maintain comfort rounds  - Make Fall Risk Sign visible to staff  - Apply yellow socks and bracelet for high fall risk patients  - Consider moving patient to room near nurses station  Outcome: Progressing     Problem: MOBILITY - ADULT  Goal: Maintain or return to baseline ADL function  Description: INTERVENTIONS:  -  Assess patient's ability to carry out ADLs; assess patient's baseline for ADL function and identify physical deficits which impact ability to perform ADLs (bathing, care of mouth/teeth, toileting, grooming, dressing, etc )  - Assess/evaluate cause of self-care deficits   - Assess range of motion  - Assess patient's mobility; develop plan if impaired  - Assess patient's need for assistive devices and provide as appropriate  - Encourage maximum independence but intervene and supervise when necessary  - Involve family in performance of ADLs  - Assess for home care needs following discharge   - Consider OT consult to assist with ADL evaluation and planning for discharge  - Provide patient education as appropriate  Outcome: Progressing  Goal: Maintains/Returns to pre admission functional level  Description: INTERVENTIONS:  - Perform BMAT or MOVE assessment daily    - Set and communicate daily mobility goal to care team and patient/family/caregiver     - Collaborate with rehabilitation services on mobility goals if consulted  - Out of bed for toileting  - Record patient progress and toleration of activity level   Outcome: Progressing     Problem: PAIN - ADULT  Goal: Verbalizes/displays adequate comfort level or baseline comfort level  Description: Interventions:  - Encourage patient to monitor pain and request assistance  - Assess pain using appropriate pain scale  - Administer analgesics based on type and severity of pain and evaluate response  - Implement non-pharmacological measures as appropriate and evaluate response  - Consider cultural and social influences on pain and pain management  - Notify physician/advanced practitioner if interventions unsuccessful or patient reports new pain  Outcome: Progressing     Problem: INFECTION - ADULT  Goal: Absence or prevention of progression during hospitalization  Description: INTERVENTIONS:  - Assess and monitor for signs and symptoms of infection  - Monitor lab/diagnostic results  - Monitor all insertion sites, i e  indwelling lines, tubes, and drains  - Monitor endotracheal if appropriate and nasal secretions for changes in amount and color  - Casey appropriate cooling/warming therapies per order  - Administer medications as ordered  - Instruct and encourage patient and family to use good hand hygiene technique  - Identify and instruct in appropriate isolation precautions for identified infection/condition  Outcome: Progressing  Goal: Absence of fever/infection during neutropenic period  Description: INTERVENTIONS:  - Monitor WBC    Outcome: Progressing     Problem: SAFETY ADULT  Goal: Patient will remain free of falls  Description: INTERVENTIONS:  - Educate patient/family on patient safety including physical limitations  - Instruct patient to call for assistance with activity   - Consult OT/PT to assist with strengthening/mobility   - Keep Call bell within reach  - Keep bed low and locked with side rails adjusted as appropriate  - Keep care items and personal belongings within reach  - Initiate and maintain comfort rounds  - Make Fall Risk Sign visible to staff  - Apply yellow socks and bracelet for high fall risk patients  - Consider moving patient to room near nurses station  Outcome: Progressing  Goal: Maintain or return to baseline ADL function  Description: INTERVENTIONS:  -  Assess patient's ability to carry out ADLs; assess patient's baseline for ADL function and identify physical deficits which impact ability to perform ADLs (bathing, care of mouth/teeth, toileting, grooming, dressing, etc )  - Assess/evaluate cause of self-care deficits   - Assess range of motion  - Assess patient's mobility; develop plan if impaired  - Assess patient's need for assistive devices and provide as appropriate  - Encourage maximum independence but intervene and supervise when necessary  - Involve family in performance of ADLs  - Assess for home care needs following discharge   - Consider OT consult to assist with ADL evaluation and planning for discharge  - Provide patient education as appropriate  Outcome: Progressing  Goal: Maintains/Returns to pre admission functional level  Description: INTERVENTIONS:  - Perform BMAT or MOVE assessment daily    - Set and communicate daily mobility goal to care team and patient/family/caregiver     - Collaborate with rehabilitation services on mobility goals if consulted  - Out of bed for toileting  - Record patient progress and toleration of activity level   Outcome: Progressing     Problem: DISCHARGE PLANNING  Goal: Discharge to home or other facility with appropriate resources  Description: INTERVENTIONS:  - Identify barriers to discharge w/patient and caregiver  - Arrange for needed discharge resources and transportation as appropriate  - Identify discharge learning needs (meds, wound care, etc )  - Arrange for interpretive services to assist at discharge as needed  - Refer to Case Management Department for coordinating discharge planning if the patient needs post-hospital services based on physician/advanced practitioner order or complex needs related to functional status, cognitive ability, or social support system  Outcome: Progressing     Problem: Knowledge Deficit  Goal: Patient/family/caregiver demonstrates understanding of disease process, treatment plan, medications, and discharge instructions  Description: Complete learning assessment and assess knowledge base    Interventions:  - Provide teaching at level of understanding  - Provide teaching via preferred learning methods  Outcome: Progressing     Problem: CARDIOVASCULAR - ADULT  Goal: Maintains optimal cardiac output and hemodynamic stability  Description: INTERVENTIONS:  - Monitor I/O, vital signs and rhythm  - Monitor for S/S and trends of decreased cardiac output  - Administer and titrate ordered vasoactive medications to optimize hemodynamic stability  - Assess quality of pulses, skin color and temperature  - Assess for signs of decreased coronary artery perfusion  - Instruct patient to report change in severity of symptoms  Outcome: Progressing  Goal: Absence of cardiac dysrhythmias or at baseline rhythm  Description: INTERVENTIONS:  - Continuous cardiac monitoring, vital signs, obtain 12 lead EKG if ordered  - Administer antiarrhythmic and heart rate control medications as ordered  - Monitor electrolytes and administer replacement therapy as ordered  Outcome: Progressing     Problem: RESPIRATORY - ADULT  Goal: Achieves optimal ventilation and oxygenation  Description: INTERVENTIONS:  - Assess for changes in respiratory status  - Assess for changes in mentation and behavior  - Position to facilitate oxygenation and minimize respiratory effort  - Oxygen administered by appropriate delivery if ordered  - Initiate smoking cessation education as indicated  - Encourage broncho-pulmonary hygiene including cough, deep breathe, Incentive Spirometry  - Assess the need for suctioning and aspirate as needed  - Assess and instruct to report SOB or any respiratory difficulty  - Respiratory Therapy support as indicated  Outcome: Progressing     Problem: MUSCULOSKELETAL - ADULT  Goal: Maintain or return mobility to safest level of function  Description: INTERVENTIONS:  - Assess patient's ability to carry out ADLs; assess patient's baseline for ADL function and identify physical deficits which impact ability to perform ADLs (bathing, care of mouth/teeth, toileting, grooming, dressing, etc )  - Assess/evaluate cause of self-care deficits   - Assess range of motion  - Assess patient's mobility  - Assess patient's need for assistive devices and provide as appropriate  - Encourage maximum independence but intervene and supervise when necessary  - Involve family in performance of ADLs  - Assess for home care needs following discharge   - Consider OT consult to assist with ADL evaluation and planning for discharge  - Provide patient education as appropriate  Outcome: Progressing  Goal: Maintain proper alignment of affected body part  Description: INTERVENTIONS:  - Support, maintain and protect limb and body alignment  - Provide patient/ family with appropriate education  Outcome: Progressing     Problem: Prexisting or High Potential for Compromised Skin Integrity  Goal: Skin integrity is maintained or improved  Description: INTERVENTIONS:  - Identify patients at risk for skin breakdown  - Assess and monitor skin integrity  - Assess and monitor nutrition and hydration status  - Monitor labs   - Assess for incontinence   - Turn and reposition patient  - Assist with mobility/ambulation  - Relieve pressure over bony prominences  - Avoid friction and shearing  - Provide appropriate hygiene as needed including keeping skin clean and dry  - Evaluate need for skin moisturizer/barrier cream  - Collaborate with interdisciplinary team   - Patient/family teaching  - Consider wound care consult   Outcome: Progressing

## 2022-11-26 NOTE — PROCEDURES
Chest Tube Insertion    Date/Time: 11/25/2022 8:13 PM  Performed by: Juan Burks DO  Authorized by: Juan Burks DO     Patient location:  ED  Other Assisting Provider: Yes (comment) (Dr Lura Holstein)    Consent:     Consent obtained:  Written    Consent given by:  Patient    Risks discussed:  Bleeding, incomplete drainage, nerve damage, pain, infection and damage to surrounding structures    Alternatives discussed:  No treatment  Universal protocol:     Procedure explained and questions answered to patient or proxy's satisfaction: yes      Relevant documents present and verified: yes      Radiology Images displayed and confirmed  If images not available, report reviewed: yes      Required blood products, implants, devices, and special equipment available: yes      Site/side marked: yes      Immediately prior to procedure a time out was called: yes      Patient identity confirmed:  Hospital-assigned identification number  Pre-procedure details:     Skin preparation:  ChloraPrep    Preparation: Patient was prepped and draped in the usual sterile fashion    Indications:     Indications: hemothorax    Anesthesia (see MAR for exact dosages): Anesthesia method:  Local infiltration    Local anesthetic:  Lidocaine 1% w/o epi  Procedure details:     Placement location:  Lateral    Laterality:  Right    Approach:  Open    Scalpel size:  10    Tube size (Fr):  20    Dissection instrument:  Finger and Ania clamp    Ultrasound guidance: no      Tension pneumothorax: no      Needle Decompression: no      Tube connected to:  Suction    Drainage characteristics:  Bloody    Suture material:  0 silk    Dressing:  4x4 sterile gauze  Post-procedure details:     Post-insertion x-ray findings: tube in good position      Patient tolerance of procedure: Tolerated well, no immediate complications  Comments:      Approximately 3-400cc of blood encountered after puncturing pleura  About 100cc in chamber

## 2022-11-26 NOTE — CONSULTS
Rib Fracture Consultation - Acute Pain Service   Amna Burgess 80 y o  male MRN: [id number]  Unit/Bed#: ICU 05 Encounter: [id number]               Assessment/Plan     Assessment:   Patient Active Problem List   Diagnosis   • Age-related macular degeneration   • Aortic regurgitation   • Atrial fibrillation (HCC)   • AV block   • Benign prostatic hyperplasia   • Bilateral dry eyes   • Bilateral impacted cerumen   • Carcinoma in situ of skin   • Cardiomyopathy [facility])   • Cataract   • Disorder of skin   • Hearing loss   • Hyperlipidemia   • Hypertension   • Mitral prolapse   • Other microscopic hematuria   • Hematoma   • Bleeding from varicose vein   • COVID-19      Amna Burgess is a 80y o  year old male with PMH s/f cardiomyopathy, pacemaker, HTN, A-Fib on eliquis (last dose 11/25) presents after mehanical fall on 11/25 with right 10th and 11th rib fx c/b hemothorax s/p chest tube  Acute Pain Service consulted for rib fx annd pain management  Patient reports falling on 11/23 after trying to move his  onto his truck  He reports tolerable pain in his right chest, worse with movement  He is on room air with O2 sats 96  He is able to pull 750 on IS although somewhat limited by pain  He is reluctant to try oxycodone due concerns it could be habit forming  Plan:     - Recommend geriatric oral opioid regimen with oxycodone 2 5 mg/5 mg PO q4hrs PRN for moderate/severe pain    - Multimodal analgesia with:  - Tylenol 975 mg PO q8hrs standing  - Robaxin 500 mg PO q6hrs standing  - Lidocaine patches to affected areas 12 hours on, 12 hours off  - Consider risk/benefit of IV Toradol 15mg q6hrs IV for 2 days, eGFR 80    Recommended interventions: Neuraxial block contraindicated due to Eliquis dose on 11/25  Could consider peripheral block if pain or respiratory status worsen  Currently tolerating PO pain regimen  [facility] will continue to follow   Please contact Acute Pain Service - [facility] via [place] book from [phone] with additional questions or concerns  See Yulisa Pisano for additional contacts and after hours information  Plan discussed with primary team    History of Present Illness    Admit Date:  11/25/2022  Hospital Day:  1 day  Primary Service:  Trauma  Attending Provider:  Mg Olivares DO  Reason for Consult / Principal Problem: Rib Fracture  HPI: Yomi Berrios is a 80 y o  male who presents with (see HPI above)    Rib Fracture Evaluation:  Injuries: Right 10th and 11th rib fx  Chest tube: Placed 11/25  Respiratory Co-morbidities: n/a  SpO2:   SPO2 RA Rest    Flowsheet Row ED to Hosp-Admission (Current) from 11/25/2022 in [address] [place] [address] Intensive Care Unit   SpO2 94 %   SpO2 Activity At Rest   O2 Device None (Room air)   O2 Flow Rate --        Incentive Spirometer: >750 mL  Platelet Count:   Results from last 7 days   Lab Units 11/26/22  0522   PLATELETS Thousands/uL 191     Coags:   Results from last 7 days   Lab Units 11/26/22  0522   INR  1 27*   PROTIME seconds 16 1*     Home anticoagulants: eliquis  DVT prophylaxis: held in the setting of hemothorax    Current pain location(s): right rib  Pain Scale: none at rest, mild to moderate with movement  Quality: sharp  Current Analgesic regimen: multimodal oral regimen    Review of Systems   Constitutional: Negative  HENT: Negative  Eyes: Negative  Respiratory: Negative for chest tightness and shortness of breath  Cardiovascular: Negative  Gastrointestinal: Negative  Negative for constipation  Endocrine: Negative  Genitourinary: Negative  Musculoskeletal: Negative  Skin: Negative  Allergic/Immunologic: Negative  Neurological: Negative  Hematological: Negative  Psychiatric/Behavioral: Negative          Historical Information   Past Medical History:   Diagnosis Date   • Bradycardia    • Cataract     resolved: 10/19/15   • Other microscopic hematuria     resolved: 1/5/18   • Pacemaker      Past Surgical History: Procedure Laterality Date   • CARDIAC PACEMAKER PLACEMENT      Dual-Chamber   • HERNIA REPAIR     • KNEE ARTHROSCOPY       Social History   Social History     Substance and Sexual Activity   Alcohol Use Yes    Comment: social     Social History     Substance and Sexual Activity   Drug Use Never     Social History     Tobacco Use   Smoking Status Never   Smokeless Tobacco Never     Family History: non-contributory    Meds/Allergies   all current active meds have been reviewed    No Known Allergies    Objective   Temp:  [97 9 °F (36 6 °C)-98 5 °F (36 9 °C)] 98 5 °F (36 9 °C)  HR:  [68-81] 74  Resp:  [13-27] 15  BP: (120-161)/(62-87) 155/85    Intake/Output Summary (Last 24 hours) at 11/26/2022 0839  Last data filed at 11/26/2022 0601  Gross per 24 hour   Intake 658 75 ml   Output 695 ml   Net -36 25 ml       Physical Exam  Constitutional:       General: He is not in acute distress  Appearance: Normal appearance  Eyes:      Extraocular Movements: Extraocular movements intact  Conjunctiva/sclera: Conjunctivae normal    Cardiovascular:      Rate and Rhythm: Normal rate and regular rhythm  Pulmonary:      Effort: Pulmonary effort is normal  No respiratory distress  Chest:      Chest wall: Tenderness present  Abdominal:      General: Abdomen is flat  There is no distension  Musculoskeletal:         General: Normal range of motion  Cervical back: Normal range of motion and neck supple  Skin:     General: Skin is warm and dry  Neurological:      General: No focal deficit present  Mental Status: He is alert and oriented to person, place, and time  Psychiatric:         Mood and Affect: Mood normal          Behavior: Behavior normal          Lab Results: I have personally reviewed pertinent labs  Imaging Studies: I have personally reviewed pertinent reports  EKG, Pathology, and Other Studies: I have personally reviewed pertinent reports        Please note that the [facility] provides consultative services regarding pain management only  With the exception of ketamine, peripheral nerve catheters, and epidural infusions (and except when indicated), final decisions regarding starting or changing doses of analgesic medications are at the discretion of the consulting service  Off hours consultation and/or medication management is generally not available      Dante Mason MD  Acute Pain Service

## 2022-11-26 NOTE — PROGRESS NOTES
Daily Progress Note - Critical Care   Khadar Trotter 80 y o  male MRN: [id number]  Unit/Bed#: ICU 05 Encounter: [id number]        ----------------------------------------------------------------------------------------  HPI:  A 68-year-old male with history of AFib on Eliquis presents with right flank pain after mechanical fall  Multiple rib fractures and right hemothorax  Chest tube placed     ---------------------------------------------------------------------------------------  SUBJECTIVE  Pain mild this AM  No PRN oxycodone overnight  Denies dyspnea  Review of Systems   Constitutional: Negative for chills and fever  HENT: Negative for ear pain and sore throat  Eyes: Negative for pain and visual disturbance  Respiratory: Negative for cough and shortness of breath  Cardiovascular: Negative for chest pain and palpitations  Gastrointestinal: Negative for abdominal pain and vomiting  Genitourinary: Negative for dysuria and hematuria  Musculoskeletal: Negative for arthralgias and back pain  Skin: Negative for color change and rash  Neurological: Negative for seizures and syncope  All other systems reviewed and are negative  Review of systems was reviewed and negative unless stated above in HPI/24-hour events   ---------------------------------------------------------------------------------------  Assessment and Plan:    Neuro:   • Diagnosis:  Analgesia  ? Plan:  Scheduled Tylenol, Robaxin, gabapentin, lidocaine patch, Abram-Banda  ? P r n  Oxycodone 5, 10  ? APS consult        CV:   • Diagnosis:  Atrial fibrillation s/p pacemaker  ? Plan: hold home eliquis, continue statin, metoprolol  • Diagnosis: HTN  ? Plan: continue home losartan  ? /70  ? HR 70        Pulm:  • Diagnosis:  Hemothorax, right, s/p thoracostomy tube set to suction, 500 cc initial output, 100 cc serosanguinous overnight  ? Plan:  Daily chest x-ray, AM CXR - no new effusions, consolidations, or infiltrates  ?  Monitor chest tube output  ? SpO2 94 % on RA        GI:   • Diagnosis: no active issues, bowel regimen        :   • Diagnosis: hx of urinary retention  ? Plan: continue home doxazosin        F/E/N:   • Plan:  d/c mIVF, replete electrolytes as needed, cardiac diet, BMP WNL this AM, Cr 0 77        Heme/Onc:   • Diagnosis: DVT prophylaxis  ? Plan: SCDs  ? Hold anticoagulation until repeat Hgb  ? Hgb 12 --> 10 9  ? INR 1 27        Endo:   • Diagnosis: no active issues        ID:   • Diagnosis: no active issues  ? Plan: daily CBC  ? WBC 8  ? afebrile        MSK/Skin:   • Diagnosis:  Acute, mildly displaced fracture of right 10th rib posteriorly and nondisplaced fracture of right 11th rib posteriorly  ? Plan: Rib fracture protocol  ? Incentive spirometry  ? Analgesia  ? PT/OT consult     Lines: R thoracostomy tube placed 11/25  Peripheral IV, right       Disposition: Transfer to Stepdown Level 2  Code Status: Level 1 - Full Code  ---------------------------------------------------------------------------------------  ICU CORE MEASURES    Prophylaxis   VTE Pharmacologic Prophylaxis: Pharmacologic VTE Prophylaxis contraindicated due to hemothorax  VTE Mechanical Prophylaxis: sequential compression device  Stress Ulcer Prophylaxis: Prophylaxis Not Indicated     ABCDE Protocol (if indicated)  Plan to perform spontaneous awakening trial today? Not applicable  Plan to perform spontaneous breathing trial today? Not applicable  Obvious barriers to extubation? Not applicable  CAM-ICU: Negative    Invasive Devices Review  Invasive Devices     Peripheral Intravenous Line  Duration           Peripheral IV 11/25/22 Right;Ventral (anterior) Forearm <1 day    Peripheral IV 11/26/22 Left;Dorsal (posterior) Forearm <1 day          Drain  Duration           Chest Tube Right <1 day              Can any invasive devices be discontinued today? No  ---------------------------------------------------------------------------------------  OBJECTIVE    Vitals   Vitals:    22 0400 22 0500 22 0600 22 0700   BP: 154/80 154/84 161/81 155/85   Pulse: 76 76 76 74   Resp: 19 18 13 15   Temp:       TempSrc:       SpO2: 94% 93% 94% 94%   Weight:       Height:         Temp (24hrs), Av 2 °F (36 8 °C), Min:97 9 °F (36 6 °C), Max:98 5 °F (36 9 °C)  Current: Temperature: 98 5 °F (36 9 °C)      Respiratory:  SpO2: SpO2: 94 %  Nasal Cannula O2 Flow Rate (L/min): 1 L/min    Invasive/non-invasive ventilation settings   Respiratory    Lab Data (Last 4 hours)    None         O2/Vent Data (Last 4 hours)    None                Physical Exam  Vitals and nursing note reviewed  Constitutional:       General: He is not in acute distress  Appearance: Normal appearance  He is well-developed and normal weight  He is not ill-appearing, toxic-appearing or diaphoretic  HENT:      Head: Normocephalic and atraumatic  Right Ear: External ear normal       Left Ear: External ear normal       Nose: Nose normal       Mouth/Throat:      Mouth: Mucous membranes are moist    Eyes:      General:         Right eye: No discharge  Left eye: No discharge  Conjunctiva/sclera: Conjunctivae normal    Cardiovascular:      Rate and Rhythm: Normal rate  Rhythm irregular  Pulses: Normal pulses  Heart sounds: No murmur heard  Pulmonary:      Effort: Pulmonary effort is normal  No respiratory distress  Breath sounds: Normal breath sounds  Chest:      Chest wall: Tenderness present  Abdominal:      General: Abdomen is flat  There is no distension  Palpations: Abdomen is soft  Tenderness: There is no abdominal tenderness  Musculoskeletal:         General: No swelling  Normal range of motion  Cervical back: Normal range of motion and neck supple  Skin:     General: Skin is warm and dry        Capillary Refill: Capillary refill takes less than 2 seconds  Neurological:      Mental Status: He is alert and oriented to person, place, and time  Psychiatric:         Mood and Affect: Mood normal          Behavior: Behavior normal          Laboratory and Diagnostics:  Results from last 7 days   Lab Units 11/26/22  0522 11/25/22  1236   WBC Thousand/uL 8 21 9 39   HEMOGLOBIN g/dL 10 9* 12 0   HEMATOCRIT % 34 5* 37 6   PLATELETS Thousands/uL 191 188   NEUTROS PCT % 77* 68   MONOS PCT % 11 13*     Results from last 7 days   Lab Units 11/26/22  0522 11/25/22  1236   SODIUM mmol/L 140 140   POTASSIUM mmol/L 4 0 4 0   CHLORIDE mmol/L 109* 105   CO2 mmol/L 23 26   ANION GAP mmol/L 8 9   BUN mg/dL 15 20   CREATININE mg/dL 0 77 0 94   CALCIUM mg/dL 8 9 9 4   GLUCOSE RANDOM mg/dL 121 118     Results from last 7 days   Lab Units 11/26/22  0522   MAGNESIUM mg/dL 2 1   PHOSPHORUS mg/dL 2 7      Results from last 7 days   Lab Units 11/26/22  0522 11/25/22  1236   INR  1 27* 1 26*   PTT seconds 30 31              ABG:    VBG:          Micro        EKG: a fib on tele  Imaging:  I have personally reviewed pertinent films in PACS    Intake and Output  I/O       11/24 0701 11/25 0700 11/25 0701 11/26 0700    I V  (mL/kg)  202 5 (2 7)    Total Intake(mL/kg)  202 5 (2 7)    Urine (mL/kg/hr)  400    Chest Tube  195    Total Output  595    Net  -392 5                Height and Weights   Height: 5' 6" (167 6 cm)  IBW (Ideal Body Weight): 63 8 kg  Body mass index is 26 79 kg/m²  Weight (last 2 days)     Date/Time Weight    11/25/22 1948 75 3 (166 01)    11/25/22 1750 81 6 (180)            Nutrition       Diet Orders   (From admission, onward)             Start     Ordered    11/26/22 0716  Diet Cardiovascular; Cardiac  Diet effective now        References:    Nutrtion Support Algorithm Enteral vs  Parenteral   Question Answer Comment   Diet Type Cardiovascular    Cardiac Cardiac    RD to adjust diet per protocol?  No        11/26/22 0717                    Active Medications  Scheduled Meds:  Current Facility-Administered Medications   Medication Dose Route Frequency Provider Last Rate   • acetaminophen  650 mg Oral Q6H Brinda Elizalde MD     • co-enzyme Q-10  100 mg Oral Daily Lenin Alvares MD     • doxazosin  8 mg Oral Daily Lenin Alvares MD     • gabapentin  100 mg Oral TID Lenin Alvares MD     • lidocaine  1 patch Topical Once Lenin Alvares MD     • losartan  50 mg Oral Daily Lenin Avlares MD     • menthol-methyl salicylate   Apply externally TID Lenin Alvares MD     • methocarbamol  500 mg Oral BID Lenin Alvares MD     • metoprolol succinate  100 mg Oral Daily Lenin Alvares MD     • oxyCODONE  10 mg Oral Q6H PRN Lenin Alvares MD     • oxyCODONE  5 mg Oral Q6H PRN Lenin Alvares MD     • polyethylene glycol  17 g Oral Daily PRN Lenin Alvares MD     • pravastatin  40 mg Oral Daily Lenin Alvares MD     • propofol          • senna-docusate sodium  1 tablet Oral BID Lenin Alvares MD       Continuous Infusions:     PRN Meds:   oxyCODONE, 10 mg, Q6H PRN  oxyCODONE, 5 mg, Q6H PRN  polyethylene glycol, 17 g, Daily PRN        Allergies   No Known Allergies  ---------------------------------------------------------------------------------------  Advance Directive and Living Will:      Power of :    POLST:    ---------------------------------------------------------------------------------------  Care Time Delivered:   No Critical Care time spent     Lenin Alvares MD, PGY-2      Portions of the record may have been created with voice recognition software  Occasional wrong word or "sound a like" substitutions may have occurred due to the inherent limitations of voice recognition software    Read the chart carefully and recognize, using context, where substitutions have occurred

## 2022-11-26 NOTE — ED NOTES
Family member Manjula [place] asked to be contacted for an update [address]  11/25/22 2054

## 2022-11-26 NOTE — ASSESSMENT & PLAN NOTE
Right Hemothorax  11/25 S/P Thoracostomy tube placement, initially 500 cc drainage  - CT to suction day 1   - 100 cc's output over night SS   - Daily CXR's, am CXR 11/27 pending  - recheck Hemoglobin at 4 pm and consider restarting eliquis 11/27

## 2022-11-26 NOTE — RESPIRATORY THERAPY NOTE
RT Protocol Note  Khadar Trotter 80 y o  male MRN: [id number]  Unit/Bed#: ICU 05 Encounter: [id number]    Assessment    Active Problems:    * No active hospital problems  *      Home Pulmonary Medications:  NA       Past Medical History:   Diagnosis Date    Bradycardia     Cataract     resolved: 10/19/15    Other microscopic hematuria     resolved: 1/5/18    Pacemaker      Social History     Socioeconomic History    Marital status:      Spouse name: None    Number of children: 2    Years of education: None    Highest education level: None   Occupational History    None   Tobacco Use    Smoking status: Never    Smokeless tobacco: Never   Vaping Use    Vaping Use: Never used   Substance and Sexual Activity    Alcohol use: Yes     Comment: social    Drug use: Never    Sexual activity: Not Currently   Other Topics Concern    None   Social History Narrative    Exercising regularly    Working full time     Social Determinants of Health     Financial Resource Strain: Not on file   Food Insecurity: Not on file   Transportation Needs: Not on file   Physical Activity: Not on file   Stress: Not on file   Social Connections: Not on file   Intimate Partner Violence: Not on file   Housing Stability: Not on file       Subjective         Objective    Physical Exam:   Assessment Type: (P) Assess only  General Appearance: (P) Awake, Alert  Respiratory Pattern: (P) Normal  Chest Assessment: (P) Chest expansion symmetrical  Bilateral Breath Sounds: (P) Clear    Vitals:  Blood pressure 160/83, pulse 80, temperature 98 2 °F (36 8 °C), temperature source Oral, resp  rate 20, weight 75 3 kg (166 lb 0 1 oz), SpO2 (P) 94 %  Imaging and other studies: I have personally reviewed pertinent reports  Plan       Airway Clearance Plan: (P) Incentive Spirometer     Resp Comments: (P) Pt evalauted for rib fracture protocol  Pt has clear breathe sounds  Instructed on incentive spirometer

## 2022-11-26 NOTE — ASSESSMENT & PLAN NOTE
· History of CMP with last ECHO on 8/21 showing EF 55-60% grade 1 DD, mildly decreased LV filling internal cavity size, mild AI, Moderate TR, Moderate MR with MVP  · Monitor fluid status closely  · Continue PO metoprolol

## 2022-11-26 NOTE — PLAN OF CARE
Problem: Potential for Falls  Goal: Patient will remain free of falls  Description: INTERVENTIONS:  - Educate patient/family on patient safety including physical limitations  - Instruct patient to call for assistance with activity   - Consult OT/PT to assist with strengthening/mobility   - Keep Call bell within reach  - Keep bed low and locked with side rails adjusted as appropriate  - Keep care items and personal belongings within reach  - Initiate and maintain comfort rounds  - Make Fall Risk Sign visible to staff  - Offer Toileting every 2 Hours, in advance of need  - Initiate/Maintain bed alarm  - Apply yellow socks and bracelet for high fall risk patients  - Consider moving patient to room near nurses station  11/25/2022 2214 by Girma Radford RN  Outcome: Progressing  11/25/2022 2213 by Girma Radford RN  Outcome: Progressing     Problem: MOBILITY - ADULT  Goal: Maintain or return to baseline ADL function  Description: INTERVENTIONS:  -  Assess patient's ability to carry out ADLs; assess patient's baseline for ADL function and identify physical deficits which impact ability to perform ADLs (bathing, care of mouth/teeth, toileting, grooming, dressing, etc )  - Assess/evaluate cause of self-care deficits   - Assess range of motion  - Assess patient's mobility; develop plan if impaired  - Assess patient's need for assistive devices and provide as appropriate  - Encourage maximum independence but intervene and supervise when necessary  - Involve family in performance of ADLs  - Assess for home care needs following discharge   - Consider OT consult to assist with ADL evaluation and planning for discharge  - Provide patient education as appropriate  11/25/2022 2214 by Girma Radford RN  Outcome: Progressing  11/25/2022 2213 by Girma Radford RN  Outcome: Progressing     Problem: PAIN - ADULT  Goal: Verbalizes/displays adequate comfort level or baseline comfort level  Description: Interventions:  - Encourage patient to monitor pain and request assistance  - Assess pain using appropriate pain scale  - Administer analgesics based on type and severity of pain and evaluate response  - Implement non-pharmacological measures as appropriate and evaluate response  - Consider cultural and social influences on pain and pain management  - Notify physician/advanced practitioner if interventions unsuccessful or patient reports new pain  Outcome: Progressing     Problem: INFECTION - ADULT  Goal: Absence or prevention of progression during hospitalization  Description: INTERVENTIONS:  - Assess and monitor for signs and symptoms of infection  - Monitor lab/diagnostic results  - Monitor all insertion sites, i e  indwelling lines, tubes, and drains  - Monitor endotracheal if appropriate and nasal secretions for changes in amount and color  - [place] appropriate cooling/warming therapies per order  - Administer medications as ordered  - Instruct and encourage patient and family to use good hand hygiene technique  - Identify and instruct in appropriate isolation precautions for identified infection/condition  Outcome: Progressing     Problem: SAFETY ADULT  Goal: Patient will remain free of falls  Description: INTERVENTIONS:  - Educate patient/family on patient safety including physical limitations  - Instruct patient to call for assistance with activity   - Consult OT/PT to assist with strengthening/mobility   - Keep Call bell within reach  - Keep bed low and locked with side rails adjusted as appropriate  - Keep care items and personal belongings within reach  - Initiate and maintain comfort rounds  - Make Fall Risk Sign visible to staff  - Offer Toileting every 2 Hours, in advance of need  - Initiate/Maintain bed alarm  - Apply yellow socks and bracelet for high fall risk patients  - Consider moving patient to room near nurses station  11/25/2022 2214 by Carter Rocha RN  Outcome: Progressing  11/25/2022 2213 by Carter Rocha RN  Outcome: Progressing Problem: CARDIOVASCULAR - ADULT  Goal: Maintains optimal cardiac output and hemodynamic stability  Description: INTERVENTIONS:  - Monitor I/O, vital signs and rhythm  - Monitor for S/S and trends of decreased cardiac output  - Administer and titrate ordered vasoactive medications to optimize hemodynamic stability  - Assess quality of pulses, skin color and temperature  - Assess for signs of decreased coronary artery perfusion  - Instruct patient to report change in severity of symptoms  Outcome: Progressing     Problem: RESPIRATORY - ADULT  Goal: Achieves optimal ventilation and oxygenation  Description: INTERVENTIONS:  - Assess for changes in respiratory status  - Assess for changes in mentation and behavior  - Position to facilitate oxygenation and minimize respiratory effort  - Oxygen administered by appropriate delivery if ordered  - Initiate smoking cessation education as indicated  - Encourage broncho-pulmonary hygiene including cough, deep breathe, Incentive Spirometry  - Assess the need for suctioning and aspirate as needed  - Assess and instruct to report SOB or any respiratory difficulty  - Respiratory Therapy support as indicated  Outcome: Progressing

## 2022-11-26 NOTE — ASSESSMENT & PLAN NOTE
Home Eliquis on hold  - Continue Metoprolol  - Patient take's Toprol XL at home, currently on 50 mg tartate--> cont  - HR stable

## 2022-11-26 NOTE — ASSESSMENT & PLAN NOTE
APS consulted  - Patient on multimodal pain regime  - Not a candidate for Block/Epidural due to timing of last Eliquis dose

## 2022-11-26 NOTE — UTILIZATION REVIEW
Initial Clinical Review    Admission: Date/Time/Statement:   Admission Orders (From admission, onward)     Ordered        11/25/22 1947  Inpatient Admission  Once                      Orders Placed This Encounter   Procedures   • Inpatient Admission     Standing Status:   Standing     Number of Occurrences:   1     Order Specific Question:   Level of Care     Answer:   Level 1 Stepdown [13]     Order Specific Question:   Estimated length of stay     Answer:   More than 2 Midnights     Order Specific Question:   Certification     Answer:   I certify that inpatient services are medically necessary for this patient for a duration of greater than two midnights  See H&P and MD Progress Notes for additional information about the patient's course of treatment  ED Arrival Information     Expected   11/25/2022     Arrival   11/25/2022 17:46    Acuity   Emergent            Means of arrival   Ambulance    Escorted by   Classie Severs [place])    Service   Trauma    Admission type   Emergency            Arrival complaint   trauma           Chief Complaint   Patient presents with   • Trauma     Trauma transfer from [facility]         Initial Presentation: transferred from [id number] [facility] y o  male, on eliquis, who presents as a trauma transfer from [facility]  [address]  Patient states that 2 days ago he was loading a snowblower into the back of his truck when he accidentally fell  He states he ended up landing on the  with his right lateral/posterior ribs  He initially felt fine, but today developed SOB prompting visit to ED  CT scan performed at [facility] showed acute, mildly displaced fracture of right 10th rib posteriorly and nondisplaced fracture of right 11th rib posteriorly, as well as a probable hemothorax  CT also incidentally revealed ascending thoracic aortic aneurysm  Transferred to [address] [place] [address] admitted to inpatient status for rib fx with R sided hemothorax requiring chest tube       Date: 11/26/22   Day 2:   R sided chest tube in place s/p fall with rib fx & hemothorax  Elquiis remains on hold at this time  Hgb 10 9  Scheduled for follow up CXR  Acute pain: rib fx and pain management  room air with O2 sats 96, able to pull 750 on IS although somewhat limited by pain   He is reluctant to try oxycodone due concerns it could be habit forming   - Recommend geriatric oral opioid regimen with oxycodone 2 5 mg/5 mg PO q4hrs PRN for moderate/severe pain  - Multimodal analgesia with:  - Tylenol 975 mg PO q8hrs standing  - Robaxin 500 mg PO q6hrs standing  - Lidocaine patches to affected areas 12 hours on, 12 hours off  - Consider risk/benefit of IV Toradol 15mg q6hrs IV for 2 days, eGFR 80    ED Triage Vitals [11/25/22 1750]   Temperature Pulse Respirations Blood Pressure SpO2   98 °F (36 7 °C) 77 20 157/79 95 %      Temp Source Heart Rate Source Patient Position - Orthostatic VS BP Location FiO2 (%)   Oral Monitor Lying Left arm --      Pain Score       No Pain          Wt Readings from Last 1 Encounters:   11/25/22 75 3 kg (166 lb 0 1 oz)     Additional Vital Signs:   11/26/22 0700 -- 74 15 155/85 112 94 % -- -- -- --   11/26/22 0600 -- 76 13 161/81 109 94 % -- -- -- --   11/26/22 0500 -- 76 18 154/84 117 93 % -- -- -- --   11/26/22 0400 -- 76 19 154/80 118 94 % -- -- -- --   11/26/22 0300 -- 76 19 154/81 113 94 % -- -- -- --   11/26/22 0200 -- 76 17 148/79 103 94 % -- -- None (Room air) --   11/26/22 0100 -- 76 17 149/78 105 94 % -- -- -- --   11/26/22 0000 98 5 °F (36 9 °C) 78 16 146/77 117 94 % -- -- None (Room air) --   11/25/22 2356 -- -- -- -- -- 95 % -- -- -- --   11/25/22 2300 -- 78 17 153/80 114 93 % -- -- None (Room air) --   11/25/22 2211 -- -- -- -- -- 94 % -- -- -- --   11/25/22 2200 -- 80 20 160/83 118 96 % -- -- None (Room air) --   11/25/22 2155 -- -- -- -- -- -- -- -- None (Room air) --   11/25/22 2100 -- 80 21 155/87 115 96 % -- -- -- --   11/25/22 2000 98 2 °F (36 8 °C) 68 17 149/81 110 96 % 24 1 L/min Nasal cannula Lying   11/25/22 19:30:04 -- 72 21 154/73 -- 96 % -- -- Nasal cannula Lying   11/25/22 1930 -- 72 20 154/73 -- 96 % -- -- Nasal cannula Lying   11/25/22 1920 -- 74 -- 136/73 -- 97 % 28 2 L/min Nasal cannula Lying     Pertinent Labs/Diagnostic Test Results:   XR chest portable ICU   Final Result  (11/26 1321)      Probable small right effusion and right base atelectasis  No pneumothorax  XR chest 1 view portable   Final Result  (11/26 1151)      Right chest tube tip near the apex with resolution of pneumothorax  Small right effusion with underlying atelectasis       11/25 Ekg=  Electronic ventricular pacemaker    Results from last 7 days   Lab Units 11/26/22  0522 11/25/22  1236   WBC Thousand/uL 8 21 9 39   HEMOGLOBIN g/dL 10 9* 12 0   HEMATOCRIT % 34 5* 37 6   PLATELETS Thousands/uL 191 188   NEUTROS ABS Thousands/µL 6 21 6 33     Results from last 7 days   Lab Units 11/26/22  0522 11/25/22  1236   SODIUM mmol/L 140 140   POTASSIUM mmol/L 4 0 4 0   CHLORIDE mmol/L 109* 105   CO2 mmol/L 23 26   ANION GAP mmol/L 8 9   BUN mg/dL 15 20   CREATININE mg/dL 0 77 0 94   EGFR ml/min/1 73sq m 80 71   CALCIUM mg/dL 8 9 9 4   CALCIUM, IONIZED mmol/L 1 19  --    MAGNESIUM mg/dL 2 1  --    PHOSPHORUS mg/dL 2 7  --      Results from last 7 days   Lab Units 11/26/22  0522 11/25/22  1236   GLUCOSE RANDOM mg/dL 121 118     Results from last 7 days   Lab Units 11/25/22  1632 11/25/22  1436 11/25/22  1236   HS TNI 0HR ng/L  --   --  16   HS TNI 2HR ng/L  --  17  --    HSTNI D2 ng/L  --  1  --    HS TNI 4HR ng/L 17  --   --    HSTNI D4 ng/L 1  --   --          Results from last 7 days   Lab Units 11/26/22  0522 11/25/22  1236   PROTIME seconds 16 1* 16 6*   INR  1 27* 1 26*   PTT seconds 30 31     ED Treatment:   Medication Administration from 11/25/2022 1504 to 11/25/2022 2029       Date/Time Order Dose Route Action     11/25/2022 1837 EST fentanyl citrate (PF) 100 MCG/2ML 50 mcg 50 mcg Intravenous Given     11/25/2022 1825 EST lidocaine (PF) (XYLOCAINE-MPF) 1 % injection 15 mL 15 mL Infiltration Given        Past Medical History:   Diagnosis Date   • Bradycardia    • Cataract     resolved: 10/19/15   • Other microscopic hematuria     resolved: 1/5/18   • Pacemaker      Admitting Diagnosis: Rib fracture [S22 39XA]  Fall [W19  XXXA]  Hemothorax on right [J94 2]  Age/Sex: 80 y o  male  Admission Orders:  R chest tube  Nursing dysphagia assessment  Neuro checks qs  Scd/foot pumps  Pt/ot eval & tx  Rib fx protocol  O2 to keep sats>92%  Consult acute pan service    Scheduled Medications:  acetaminophen, 650 mg, Oral, Q6H [address]  co-enzyme Q-10, 100 mg, Oral, Daily  doxazosin, 8 mg, Oral, Daily  gabapentin, 100 mg, Oral, TID  lidocaine, 1 patch, Topical, Once  losartan, 50 mg, Oral, Daily  menthol-methyl salicylate, , Apply externally, TID  methocarbamol, 500 mg, Oral, BID  metoprolol succinate, 100 mg, Oral, Daily  pravastatin, 40 mg, Oral, Daily  senna-docusate sodium, 1 tablet, Oral, BID    PRN Meds:  oxyCODONE, 10 mg, Oral, Q6H PRN  oxyCODONE, 5 mg, Oral, Q6H PRN  polyethylene glycol, 17 g, Oral, Daily PRN    Network Utilization Review Department  ATTENTION: Please call with any questions or concerns to [phone] and carefully listen to the prompts so that you are directed to the right person  All voicemails are confidential   Sherman Roger all requests for admission clinical reviews, approved or denied determinations and any other requests to dedicated fax number below belonging to the campus where the patient is receiving treatment   List of dedicated fax numbers for the Facilities:  [address] DENIALS (Administrative/Medical Necessity) [phone]   [address] [place] [address] (Maternity/NICU/Pediatrics) [phone]   [address] [phone]   [place] [phone]     [address] 150 [facility] 435 [facility] [place] [address] [facility] 28 [facility] 310 [facility] [place] 134 815 [facility] [phone]

## 2022-11-26 NOTE — ASSESSMENT & PLAN NOTE
Right sided rib fractures 10 mildly displaced, 11 nondisplaced  - APS Consult appreciated  - Multimodal pain control  - Respiratory Protocol  - Encourage IS  - PT/OT  - Geriatric Consul  - IS around 700 today, cont    - PIC Score 8

## 2022-11-27 ENCOUNTER — APPOINTMENT (INPATIENT)
Dept: RADIOLOGY | Facility: HOSPITAL | Age: 87
End: 2022-11-27

## 2022-11-27 LAB
ANION GAP SERPL CALCULATED.3IONS-SCNC: 4 MMOL/L (ref 4–13)
BASOPHILS # BLD AUTO: 0.03 THOUSANDS/ÂΜL (ref 0–0.1)
BASOPHILS NFR BLD AUTO: 0 % (ref 0–1)
BUN SERPL-MCNC: 18 MG/DL (ref 5–25)
CA-I BLD-SCNC: 1.2 MMOL/L (ref 1.12–1.32)
CALCIUM SERPL-MCNC: 9.4 MG/DL (ref 8.3–10.1)
CHLORIDE SERPL-SCNC: 109 MMOL/L (ref 96–108)
CO2 SERPL-SCNC: 26 MMOL/L (ref 21–32)
CREAT SERPL-MCNC: 1 MG/DL (ref 0.6–1.3)
EOSINOPHIL # BLD AUTO: 0.16 THOUSAND/ÂΜL (ref 0–0.61)
EOSINOPHIL NFR BLD AUTO: 2 % (ref 0–6)
ERYTHROCYTE [DISTWIDTH] IN BLOOD BY AUTOMATED COUNT: 13.6 % (ref 11.6–15.1)
GFR SERPL CREATININE-BSD FRML MDRD: 66 ML/MIN/1.73SQ M
GLUCOSE SERPL-MCNC: 102 MG/DL (ref 65–140)
HCT VFR BLD AUTO: 35.1 % (ref 36.5–49.3)
HGB BLD-MCNC: 10.9 G/DL (ref 12–17)
IMM GRANULOCYTES # BLD AUTO: 0.01 THOUSAND/UL (ref 0–0.2)
IMM GRANULOCYTES NFR BLD AUTO: 0 % (ref 0–2)
LYMPHOCYTES # BLD AUTO: 1.27 THOUSANDS/ÂΜL (ref 0.6–4.47)
LYMPHOCYTES NFR BLD AUTO: 16 % (ref 14–44)
MAGNESIUM SERPL-MCNC: 2.3 MG/DL (ref 1.6–2.6)
MCH RBC QN AUTO: 30.7 PG (ref 26.8–34.3)
MCHC RBC AUTO-ENTMCNC: 31.1 G/DL (ref 31.4–37.4)
MCV RBC AUTO: 99 FL (ref 82–98)
MONOCYTES # BLD AUTO: 1.31 THOUSAND/ÂΜL (ref 0.17–1.22)
MONOCYTES NFR BLD AUTO: 17 % (ref 4–12)
NEUTROPHILS # BLD AUTO: 4.98 THOUSANDS/ÂΜL (ref 1.85–7.62)
NEUTS SEG NFR BLD AUTO: 65 % (ref 43–75)
NRBC BLD AUTO-RTO: 0 /100 WBCS
PHOSPHATE SERPL-MCNC: 3.3 MG/DL (ref 2.3–4.1)
PLATELET # BLD AUTO: 188 THOUSANDS/UL (ref 149–390)
PMV BLD AUTO: 11.3 FL (ref 8.9–12.7)
POTASSIUM SERPL-SCNC: 4.1 MMOL/L (ref 3.5–5.3)
RBC # BLD AUTO: 3.55 MILLION/UL (ref 3.88–5.62)
SODIUM SERPL-SCNC: 139 MMOL/L (ref 135–147)
WBC # BLD AUTO: 7.76 THOUSAND/UL (ref 4.31–10.16)

## 2022-11-27 RX ADMIN — GABAPENTIN 100 MG: 300 CAPSULE ORAL at 08:08

## 2022-11-27 RX ADMIN — METHOCARBAMOL 500 MG: 500 TABLET ORAL at 17:09

## 2022-11-27 RX ADMIN — METHOCARBAMOL 500 MG: 500 TABLET ORAL at 08:08

## 2022-11-27 RX ADMIN — ACETAMINOPHEN 650 MG: 325 TABLET ORAL at 17:09

## 2022-11-27 RX ADMIN — MENTHOL, METHYL SALICYLATE: 10; 15 CREAM TOPICAL at 08:10

## 2022-11-27 RX ADMIN — METOPROLOL TARTRATE 50 MG: 50 TABLET, FILM COATED ORAL at 08:09

## 2022-11-27 RX ADMIN — ACETAMINOPHEN 650 MG: 325 TABLET ORAL at 05:07

## 2022-11-27 RX ADMIN — SENNOSIDES AND DOCUSATE SODIUM 1 TABLET: 8.6; 5 TABLET ORAL at 17:09

## 2022-11-27 RX ADMIN — GABAPENTIN 100 MG: 300 CAPSULE ORAL at 21:08

## 2022-11-27 RX ADMIN — ACETAMINOPHEN 650 MG: 325 TABLET ORAL at 11:32

## 2022-11-27 RX ADMIN — SENNOSIDES AND DOCUSATE SODIUM 1 TABLET: 8.6; 5 TABLET ORAL at 08:08

## 2022-11-27 RX ADMIN — APIXABAN 5 MG: 5 TABLET, FILM COATED ORAL at 08:08

## 2022-11-27 RX ADMIN — Medication 100 MG: at 08:08

## 2022-11-27 RX ADMIN — APIXABAN 5 MG: 5 TABLET, FILM COATED ORAL at 17:09

## 2022-11-27 RX ADMIN — METOPROLOL TARTRATE 50 MG: 50 TABLET, FILM COATED ORAL at 21:08

## 2022-11-27 RX ADMIN — MENTHOL, METHYL SALICYLATE: 10; 15 CREAM TOPICAL at 16:15

## 2022-11-27 RX ADMIN — ACETAMINOPHEN 650 MG: 325 TABLET ORAL at 23:04

## 2022-11-27 RX ADMIN — PRAVASTATIN SODIUM 40 MG: 40 TABLET ORAL at 08:08

## 2022-11-27 RX ADMIN — GABAPENTIN 100 MG: 300 CAPSULE ORAL at 16:12

## 2022-11-27 NOTE — PLAN OF CARE
Problem: Potential for Falls  Goal: Patient will remain free of falls  Description: INTERVENTIONS:  - Educate patient/family on patient safety including physical limitations  - Instruct patient to call for assistance with activity   - Consult OT/PT to assist with strengthening/mobility   - Keep Call bell within reach  - Keep bed low and locked with side rails adjusted as appropriate  - Keep care items and personal belongings within reach  - Initiate and maintain comfort rounds  - Make Fall Risk Sign visible to staff  - Apply yellow socks and bracelet for high fall risk patients  - Consider moving patient to room near nurses station  Outcome: Progressing     Problem: MOBILITY - ADULT  Goal: Maintain or return to baseline ADL function  Description: INTERVENTIONS:  -  Assess patient's ability to carry out ADLs; assess patient's baseline for ADL function and identify physical deficits which impact ability to perform ADLs (bathing, care of mouth/teeth, toileting, grooming, dressing, etc )  - Assess/evaluate cause of self-care deficits   - Assess range of motion  - Assess patient's mobility; develop plan if impaired  - Assess patient's need for assistive devices and provide as appropriate  - Encourage maximum independence but intervene and supervise when necessary  - Involve family in performance of ADLs  - Assess for home care needs following discharge   - Consider OT consult to assist with ADL evaluation and planning for discharge  - Provide patient education as appropriate  Outcome: Progressing  Goal: Maintains/Returns to pre admission functional level  Description: INTERVENTIONS:  - Perform BMAT or MOVE assessment daily    - Set and communicate daily mobility goal to care team and patient/family/caregiver     - Collaborate with rehabilitation services on mobility goals if consulted  - Out of bed for toileting  - Record patient progress and toleration of activity level   Outcome: Progressing     Problem: PAIN - ADULT  Goal: Verbalizes/displays adequate comfort level or baseline comfort level  Description: Interventions:  - Encourage patient to monitor pain and request assistance  - Assess pain using appropriate pain scale  - Administer analgesics based on type and severity of pain and evaluate response  - Implement non-pharmacological measures as appropriate and evaluate response  - Consider cultural and social influences on pain and pain management  - Notify physician/advanced practitioner if interventions unsuccessful or patient reports new pain  Outcome: Progressing     Problem: INFECTION - ADULT  Goal: Absence or prevention of progression during hospitalization  Description: INTERVENTIONS:  - Assess and monitor for signs and symptoms of infection  - Monitor lab/diagnostic results  - Monitor all insertion sites, i e  indwelling lines, tubes, and drains  - Monitor endotracheal if appropriate and nasal secretions for changes in amount and color  - [place] appropriate cooling/warming therapies per order  - Administer medications as ordered  - Instruct and encourage patient and family to use good hand hygiene technique  - Identify and instruct in appropriate isolation precautions for identified infection/condition  Outcome: Progressing  Goal: Absence of fever/infection during neutropenic period  Description: INTERVENTIONS:  - Monitor WBC    Outcome: Progressing     Problem: SAFETY ADULT  Goal: Patient will remain free of falls  Description: INTERVENTIONS:  - Educate patient/family on patient safety including physical limitations  - Instruct patient to call for assistance with activity   - Consult OT/PT to assist with strengthening/mobility   - Keep Call bell within reach  - Keep bed low and locked with side rails adjusted as appropriate  - Keep care items and personal belongings within reach  - Initiate and maintain comfort rounds  - Make Fall Risk Sign visible to staff  - Apply yellow socks and bracelet for high fall risk patients  - Consider moving patient to room near nurses station  Outcome: Progressing  Goal: Maintain or return to baseline ADL function  Description: INTERVENTIONS:  -  Assess patient's ability to carry out ADLs; assess patient's baseline for ADL function and identify physical deficits which impact ability to perform ADLs (bathing, care of mouth/teeth, toileting, grooming, dressing, etc )  - Assess/evaluate cause of self-care deficits   - Assess range of motion  - Assess patient's mobility; develop plan if impaired  - Assess patient's need for assistive devices and provide as appropriate  - Encourage maximum independence but intervene and supervise when necessary  - Involve family in performance of ADLs  - Assess for home care needs following discharge   - Consider OT consult to assist with ADL evaluation and planning for discharge  - Provide patient education as appropriate  Outcome: Progressing  Goal: Maintains/Returns to pre admission functional level  Description: INTERVENTIONS:  - Perform BMAT or MOVE assessment daily    - Set and communicate daily mobility goal to care team and patient/family/caregiver     - Collaborate with rehabilitation services on mobility goals if consulted  - Out of bed for toileting  - Record patient progress and toleration of activity level   Outcome: Progressing     Problem: DISCHARGE PLANNING  Goal: Discharge to home or other facility with appropriate resources  Description: INTERVENTIONS:  - Identify barriers to discharge w/patient and caregiver  - Arrange for needed discharge resources and transportation as appropriate  - Identify discharge learning needs (meds, wound care, etc )  - Arrange for interpretive services to assist at discharge as needed  - Refer to Case Management Department for coordinating discharge planning if the patient needs post-hospital services based on physician/advanced practitioner order or complex needs related to functional status, cognitive ability, or social support system  Outcome: Progressing     Problem: Knowledge Deficit  Goal: Patient/family/caregiver demonstrates understanding of disease process, treatment plan, medications, and discharge instructions  Description: Complete learning assessment and assess knowledge base    Interventions:  - Provide teaching at level of understanding  - Provide teaching via preferred learning methods  Outcome: Progressing     Problem: CARDIOVASCULAR - ADULT  Goal: Maintains optimal cardiac output and hemodynamic stability  Description: INTERVENTIONS:  - Monitor I/O, vital signs and rhythm  - Monitor for S/S and trends of decreased cardiac output  - Administer and titrate ordered vasoactive medications to optimize hemodynamic stability  - Assess quality of pulses, skin color and temperature  - Assess for signs of decreased coronary artery perfusion  - Instruct patient to report change in severity of symptoms  Outcome: Progressing  Goal: Absence of cardiac dysrhythmias or at baseline rhythm  Description: INTERVENTIONS:  - Continuous cardiac monitoring, vital signs, obtain 12 lead EKG if ordered  - Administer antiarrhythmic and heart rate control medications as ordered  - Monitor electrolytes and administer replacement therapy as ordered  Outcome: Progressing     Problem: RESPIRATORY - ADULT  Goal: Achieves optimal ventilation and oxygenation  Description: INTERVENTIONS:  - Assess for changes in respiratory status  - Assess for changes in mentation and behavior  - Position to facilitate oxygenation and minimize respiratory effort  - Oxygen administered by appropriate delivery if ordered  - Initiate smoking cessation education as indicated  - Encourage broncho-pulmonary hygiene including cough, deep breathe, Incentive Spirometry  - Assess the need for suctioning and aspirate as needed  - Assess and instruct to report SOB or any respiratory difficulty  - Respiratory Therapy support as indicated  Outcome: Progressing     Problem: MUSCULOSKELETAL - ADULT  Goal: Maintain or return mobility to safest level of function  Description: INTERVENTIONS:  - Assess patient's ability to carry out ADLs; assess patient's baseline for ADL function and identify physical deficits which impact ability to perform ADLs (bathing, care of mouth/teeth, toileting, grooming, dressing, etc )  - Assess/evaluate cause of self-care deficits   - Assess range of motion  - Assess patient's mobility  - Assess patient's need for assistive devices and provide as appropriate  - Encourage maximum independence but intervene and supervise when necessary  - Involve family in performance of ADLs  - Assess for home care needs following discharge   - Consider OT consult to assist with ADL evaluation and planning for discharge  - Provide patient education as appropriate  Outcome: Progressing  Goal: Maintain proper alignment of affected body part  Description: INTERVENTIONS:  - Support, maintain and protect limb and body alignment  - Provide patient/ family with appropriate education  Outcome: Progressing     Problem: Prexisting or High Potential for Compromised Skin Integrity  Goal: Skin integrity is maintained or improved  Description: INTERVENTIONS:  - Identify patients at risk for skin breakdown  - Assess and monitor skin integrity  - Assess and monitor nutrition and hydration status  - Monitor labs   - Assess for incontinence   - Turn and reposition patient  - Assist with mobility/ambulation  - Relieve pressure over bony prominences  - Avoid friction and shearing  - Provide appropriate hygiene as needed including keeping skin clean and dry  - Evaluate need for skin moisturizer/barrier cream  - Collaborate with interdisciplinary team   - Patient/family teaching  - Consider wound care consult   Outcome: Progressing     Problem: HEMATOLOGIC - ADULT  Goal: Maintains hematologic stability  Description: INTERVENTIONS  - Assess for signs and symptoms of bleeding or hemorrhage  - Monitor labs  - Administer supportive blood products/factors as ordered and appropriate  Outcome: Progressing

## 2022-11-27 NOTE — OCCUPATIONAL THERAPY NOTE
Occupational Therapy Evaluation     Patient Name: Burgess Motley  MHVRI'F Date: 11/27/2022  Problem List  Principal Problem:    Hemothorax  Active Problems:    Atrial fibrillation ([address] )    Cardiomyopathy ([facility] [address] [address] )    Hyperlipidemia    Hypertension    Acute pain due to trauma    Closed fracture of multiple ribs of right side    Past Medical History  Past Medical History:   Diagnosis Date    Bradycardia     Cataract     resolved: 10/19/15    Other microscopic hematuria     resolved: 1/5/18    Pacemaker      Past Surgical History  Past Surgical History:   Procedure Laterality Date    CARDIAC PACEMAKER PLACEMENT      Dual-Chamber    HERNIA REPAIR      KNEE ARTHROSCOPY               11/27/22 0823   OT Last Visit   OT Visit Date 11/27/22   Note Type   Note type Evaluation   Pain Assessment   Pain Assessment Tool 0-10   Pain Score 7   Pain Location/Orientation Location: Rib Cage  (when coughinh)   Hospital Pain Intervention(s) Repositioned; Ambulation/increased activity; Emotional support   Restrictions/Precautions   Other Precautions Pain;Multiple lines  (CT to suction)   Home Living   Type of [address] [place] [address] One level;Stairs to enter with rails   Bathroom Shower/Tub Walk-in shower   Bathroom Toilet Standard   Bathroom Equipment   (denzita)   [address]   (comfort)   Additional Comments Pt reports living in a 1 story home with 2 vs 10 AGUS  Prior Function   Level of [place] Independent with ADLs; Independent with IADLS   Lives With ELENA [place] 89 in the last 6 months 1 to 4  (1, leading to this admission)   Vocational Full time employment   Lifestyle   Autonomy Pt reports being I with ADLS, IADLS and mobility without device PTA  (+)    Reciprocal Relationships Pt lives alone but has supportive family nearby   Service to Others Works full time in a [employer] Enjoys staying active and doing work around the house   Subjective   Subjective Pt reports that he has no concerns about returning home upon d/c  ADL   Where Assessed Edge of bed   Eating Assistance 7  Independent   Grooming Assistance 5  Supervision/Setup   UB Bathing Assistance 5  Supervision/Setup   LB Bathing Assistance 5  Supervision/Setup   UB Dressing Assistance 5  Supervision/Setup   LB Dressing Assistance 5  Supervision/Setup   Toileting Assistance  5  Supervision/Setup   Bed Mobility   Supine to Sit 5  Supervision   Additional Comments After OT session pt OOB in chair with alarm on and all needs within reach  Transfers   Sit to Stand 5  Supervision   Additional items Increased time required   Stand to Sit 5  Supervision   Additional items Increased time required   Functional Mobility   Functional Mobility 5  Supervision   Additional Comments Pt demonstrated household mobility within room with no device  Pt limited by CT to suction at this time  Balance   Static Sitting Good   Dynamic Sitting Fair +   Static Standing Fair   Dynamic Standing Fair   Ambulatory Fair -   Activity Tolerance   Activity Tolerance Patient tolerated treatment well   Medical Staff Made Aware Seen with PT 2* medical complexity/ instability   Nurse Made Aware RN confirmed okay to see pt   RUE Assessment   RUE Assessment WFL   LUE Assessment   LUE Assessment WFL   Cognition   Overall Cognitive Status WFL   Arousal/Participation Alert; Cooperative   Attention Within functional limits   Orientation Level Oriented X4   Memory Within functional limits   Following Commands Follows all commands and directions without difficulty   Comments Pt is very pleasant and cooperative  Assessment   Assessment Pt is a 80 y o  male [facility] w/ hemothorax s/p CT placement  Pt  has a past medical history of Bradycardia, Cataract, Other microscopic hematuria, and Pacemaker  Pt with active OT orders and up with assistance  orders   Pt resides in a 1 story home with 2 vs 10 AGUS  Pt lives alone but has supportive family nearby able to assist  Pt was I w/  ADLS and IADLS, (+) drove, & required no use of DME PTA  Currently pt is supervision for functional transfers and functional mobility, and supervision for ADLS overall  Pt is limited at this time 2*: pain  Based on the aforementioned OT evaluation, functional performance deficits, and assessments, pt has been identified as a moderate complexity evaluation  From OT standpoint, anticipate d/c home with family support  Recommend continued participation in [address] [facility] and functional mobility with staff  No further acute OT needs, d/c OT  Goals   Patient Goals To feel better and return home   Recommendation   OT Discharge Recommendation No rehabilitation needs  (Home with increased social support)   AM-PAC Daily Activity Inpatient   Lower Body Dressing 3   Bathing 3   Toileting 4   Upper Body Dressing 4   Grooming 4   Eating 4   Daily Activity Raw Score 22   Daily Activity Standardized Score (Calc for Raw Score >=11) 47  1   AM-PAC Applied Cognition Inpatient   Following a Speech/Presentation 4   Understanding Ordinary Conversation 4   Taking Medications 4   Remembering Where Things Are Placed or Put Away 4   Remembering List of 4-5 Errands 4   Taking Care of Complicated Tasks 3   Applied Cognition Raw Score 23   Applied Cognition Standardized Score 53 08   Modified Maximilian Scale   Modified [place] Scale 3       Deidra Adan, MOT, OTR/L

## 2022-11-27 NOTE — QUICK NOTE
I was made aware by nursing staff that the patient had pulled part of his chest tube  When speaking to the patient he states he was getting up to go to the restroom when part of his chest tube became dislodged from the atrium system  Nursing informed me that she placed the distal end of the chest tube in sterile saline and obtained a new atrium system and reconnected the chest tube back to suction  On exam the patient does endorse mild tenderness to palpation around the right chest tube insertion site  The chest tube is set to suction, currently has an air leak  The patient did not previously have an air leak  A portable chest x-ray was immediately obtained  Chest x-ray is pending final read

## 2022-11-27 NOTE — NURSING NOTE
Patient accidentally pulled apart chest tube while being assisted back to bed from bathroom  Severe air leak noticed after patient got back into bed  Chest tube immediately assessed and noted to be pulled apart from chest tube and atrium tubing  Chest tube immediately inserted into sterile bowl filled with sterile water  Patient denies any SOB and maintaining SpO2 on 2L nasal cannula  New atrium set up and reconnected to chest tube  -20cm suction applied  Scant amount of sanguinous drainage noted in atrium tubing  Fine crackles auscultated on right side lung field  Patient able to spontaneously bring up yellow sputum  Marek Jorge with trauma made aware via [employer]

## 2022-11-27 NOTE — PROGRESS NOTES
[facility]  Progress Note - Mayra Polanco 10/21/1933, 80 y o  male MRN: [id number]  Unit/Bed#: [facility] 803-01 Encounter: [id number]  Primary Care Provider: Matt Moore DO   Date and time admitted to hospital: 11/25/2022  5:46 PM    Closed fracture of multiple ribs of right side  Assessment & Plan  Right sided rib fractures 10 mildly displaced, 11 nondisplaced  - APS Consult appreciated  - Multimodal pain control  - Respiratory Protocol  - Encourage IS  - PT/OT  - Geriatric Consul  - IS around 700 today, cont  - PIC Score 8    Acute pain due to trauma  Assessment & Plan  APS consulted  - Patient on multimodal pain regime  - Not a candidate for Block/Epidural due to timing of last Eliquis dose    Hypertension  Assessment & Plan  · Continue home lopressor (though converted to shorter acting metoprolol 50 mg BID   · Hold home losartan (Given possible role for toradol for acute pain)      Cardiomyopathy ([address] )  Assessment & Plan  · History of CMP with last ECHO on 8/21 showing EF 55-60% grade 1 DD, mildly decreased LV filling internal cavity size, mild AI, Moderate TR, Moderate MR with MVP  · Monitor fluid status closely  · Continue PO metoprolol    Atrial fibrillation (HCC)  Assessment & Plan  Home Eliquis on hold  - Continue Metoprolol  - Patient take's Toprol XL at home, currently on 50 mg tartate--> cont  - HR stable     * Hemothorax  Assessment & Plan  Right Hemothorax  11/25 S/P Thoracostomy tube placement, initially 500 cc drainage  - CT to suction day 1   - 100 cc's output over night SS   - Daily CXR's, am CXR 11/27 shows slight retraction of right CT sidehole, but still in good placement   Small Right PTX, Small right Pleural effusion  - Continue CT to Suction today  - CXR in am 11/28                 Bowel Regimen:  Senna, Colace  VTE Prophylaxis: Eliquis     Disposition: PT/OT recommend eventual home d/c     Subjective   Chief Complaint: Right Rib pain    Subjective: Patient states right rib pain not bad, pain medications are helping  The patient denies HA, Dizziness, Lightheadedness, SOB, difficulty breathing  Patient states he does not have CP except for pain over ribs  He states he is tolerating a diet  Objective   Vitals:   Temp:  [97 5 °F (36 4 °C)-98 6 °F (37 °C)] 97 5 °F (36 4 °C)  HR:  [68-79] 75  Resp:  [16-20] 20  BP: (115-140)/(53-73) 134/72    I/O       11/25 0701 11/26 0700 11/26 0701 11/27 0700 11/27 0701 11/28 0700    P  O   200 60    I V  (mL/kg) 658 8 (8 7) 261 3 (3 5)     Total Intake(mL/kg) 658 8 (8 7) 461 3 (6 1) 60 (0 8)    Urine (mL/kg/hr) 400 0 (0)     Chest Tube 295 320     Total Output 695 320     Net -36 3 +141 3 +60           Unmeasured Urine Occurrence  3 x            Physical Exam:   GENERAL APPEARANCE: NAD, appears comfortable sitting up in a chair  NEURO: Intact, GCS 15 alert and oriented x3  HEENT: PERRL  CV:  Regular rate and rhythm  LUNGS: RLL with some rhonchi, clears with coughing, has strong cough  Left CTA  CT in place, no significant air leak noted     GI:  Abdomen soft nontender nondistended positive bowel sounds x4  :  Voiding on own  MSK:  Moves all extremities, bilateral upper extremities 5/5 bilateral lower extremities 5/5 strength  SKIN:  Chest tube dressings intact    Invasive Devices     Peripheral Intravenous Line  Duration           Peripheral IV 11/25/22 Right;Ventral (anterior) Forearm 1 day    Peripheral IV 11/26/22 Left;Dorsal (posterior) Forearm 1 day          Drain  Duration           Chest Tube Right 1 day                 PIC Score  PIC Pain Score: 2 (11/27/2022  8:23 AM)  PIC Incentive Spirometry Score: 2 (11/27/2022  7:00 AM)  PIC Cough Description: 3 (11/27/2022  7:00 AM)  PIC Total Score: 8 (11/27/2022  7:00 AM)       If the Total PIC Score </=5, did you consult APS and evaluate patient for further intervention?: yes      Pain:    Incentive Spirometry  Cough  3 = Controlled  4 = Above goal volume 3 = Strong  2 = Moderate  3 = Goal to alert volume 2 = Weak  1 = Severe  2 = Below alert volume 1 = Absent     1 = Unable to perform IS         Lab Results:   Results: I have personally reviewed all pertinent laboratory/tests results, BMP/CMP:   Lab Results   Component Value Date    SODIUM 139 11/27/2022    K 4 1 11/27/2022     (H) 11/27/2022    CO2 26 11/27/2022    BUN 18 11/27/2022    CREATININE 1 00 11/27/2022    CALCIUM 9 4 11/27/2022    EGFR 66 11/27/2022    and CBC:   Lab Results   Component Value Date    WBC 7 76 11/27/2022    HGB 10 9 (L) 11/27/2022    HCT 35 1 (L) 11/27/2022    MCV 99 (H) 11/27/2022     11/27/2022    MCH 30 7 11/27/2022    MCHC 31 1 (L) 11/27/2022    RDW 13 6 11/27/2022    MPV 11 3 11/27/2022    NRBC 0 11/27/2022     Imaging: I have personally reviewed pertinent reports       Other Studies: NOne

## 2022-11-27 NOTE — PHYSICAL THERAPY NOTE
Physical Therapy Evaluation     Patient's Name: Shlomo Becker    Admitting Diagnosis  Rib fracture Irene Mullet  Fall [W19  XXXA]  Hemothorax on right [J94 2]    Problem List  Patient Active Problem List   Diagnosis    Age-related macular degeneration    Aortic regurgitation    Atrial fibrillation (HCC)    AV block    Benign prostatic hyperplasia    Bilateral dry eyes    Bilateral impacted cerumen    Carcinoma in situ of skin    Cardiomyopathy ([address] )    Cataract    Disorder of skin    Hearing loss    Hyperlipidemia    Hypertension    Mitral prolapse    Other microscopic hematuria    Hematoma    Bleeding from varicose vein    COVID-19    Hemothorax    Acute pain due to trauma    Closed fracture of multiple ribs of right side       Past Medical History  Past Medical History:   Diagnosis Date    Bradycardia     Cataract     resolved: 10/19/15    Other microscopic hematuria     resolved: 1/5/18    Pacemaker        Past Surgical History  Past Surgical History:   Procedure Laterality Date    CARDIAC PACEMAKER PLACEMENT      Dual-Chamber    HERNIA REPAIR      KNEE ARTHROSCOPY            11/27/22 0822   PT Last Visit   PT Visit Date 11/27/22   Note Type   Note type Evaluation   Pain Assessment   Pain Assessment Tool 0-10   Pain Score 7  (w/ coughing/pressure)   Pain Location/Orientation Orientation: Right;Location: Rib Cage; Location: Back   Effect of Pain on Daily Activities requires increased time for bed mobility   Patient's Stated Pain Goal No pain   Hospital Pain Intervention(s) Ambulation/increased activity;Repositioned   Restrictions/Precautions   Weight Bearing Precautions Per Order No   Braces or Orthoses   (none)   Other Precautions Pain; Fall Risk;Telemetry;Multiple lines; Bed Alarm; Chair Alarm  (CHEST TUBE TO SUCTION)   Home Living   Type of [address] [place] [address] One level;Stairs to enter with rails   Bathroom Shower/Tub Walk-in shower   [address] [employer] Equipment   (denies)   Additional Comments Per patient, prior to this admission he resided alone in one story home (2 krystin from front or 10 steps from garage- patient normally goes in through garage)  At his baseline he is I with mobility (no use of AD), ADLs, and iADLS  Works full time in [employer]  Wakes up daily at 3am to get to work at 4  +   Has supportive family nearby  Prior Function   Level of [place] Independent with ADLs   Lives With Alone   Receives Help From [facility] in the last 6 months 1 to 4   Vocational Full time employment   General   Additional Pertinent History 80year old male admitted to -B on 11/25/2022 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 on   Diagnoses include right sided hemothorax and rib fractures (acute mildly displaced fracture of 10th rib posteriorly and non-displaced fracture of 11th rib)  Patient currently has chest tube to suction  Family/Caregiver Present No   Cognition   Overall Cognitive Status WFL   Arousal/Participation Alert   Attention Within functional limits   Orientation Level Oriented X4   Memory Within functional limits   Following Commands Follows all commands and directions without difficulty   Comments pleasant, cooperative   Subjective   Subjective "early to bed and early to rise makes a man healthy, wealthy, and wise"   RUE Assessment   RUE Assessment WFL   LUE Assessment   LUE Assessment WFL   RLE Assessment   RLE Assessment WFL   LLE Assessment   LLE Assessment WFL   Bed Mobility   Supine to Sit 5  Supervision   Additional items HOB elevated; Increased time required   Sit to Supine Unable to assess   Additional Comments post eval, patient OOB in chair w/ alarm active   Transfers   Sit to Stand 5  Supervision   Additional items Increased time required   Stand to Sit 5  Supervision   Additional items Increased time required   Ambulation/Elevation   Gait pattern Inconsistent shad  (quick gait speed- VC to reduced speed for safety)   Gait Assistance 5  Supervision   Additional items Assist x 1  (to manage chest tube)   Assistive Device None   Distance 20 feet  (5 feet x 4 (limited by chest tube to suction))   Balance   Static Sitting Good   Static Standing Fair   Ambulatory Fair -   Endurance Deficit   Endurance Deficit Yes   Endurance Deficit Description pain, limited by chest tube to suction for ambulation, O2 sats 94-95% on RA throughout session   Activity Tolerance   Activity Tolerance Patient tolerated treatment well   Medical Staff Made Aware This high complexity evaluation was performed with an occupational therapist due to the patient's co-morbidities, clinically unstable presentation, and present impairments which are a regression from the patient's baseline  Nurse Made Aware marsha to see per RN [facility] and f/u post   Assessment   Prognosis Good   Problem List Decreased endurance; Impaired balance;Decreased mobility;Pain   Assessment PT completed evaluation of 80year old male admitted to - on 11/25/2022 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 on   Diagnoses include right sided hemothorax and rib fractures (acute mildly displaced fracture of 10th rib posteriorly and non-displaced fracture of 11th rib)  Patient currently has chest tube to suction  Current status instabilities include pain (7/10 with coughing/pressure), chest tube, falls risk, bed/chair alarms, and continuous O2/HR monitoring  PMH is significant for knee arthroscopy, hernia repair, and pacemaker  Per patient, prior to this admission he resided alone in one story home (2 [address]rystin from front or 10 steps from garage- patient normally goes in through garage)  At his baseline he is I with mobility (no use of AD), ADLs, and iADLS  Works full time in [employer]  Wakes up daily at 3am to get to work at 4  +   Has supportive family nearby       Current impairments include pain, decreased activity tolerance, precautions to assist with healing of rib fractures (limit bending, twisting, lifting), and gait deviations  During PT evaluation patient was able to perform bed moblity (w/ HOB elevated) S level- recommended use of recliner PRN upon d/c home  He was S level for sit<-->stand transfers and short distance ambulation  He walked 5 feet x 4 (limited by chest tube to suction)  Mildly unsteady during 1-2 steps and quick requiring VC to reduce speed of mobility for increased safety  Will see for f/u visit to further assess ambulation and stairs  Anticipate no PT needs upon d/c and recommend d/c home with family support PRN  Patient will continue to benefit from continued skilled PT this admission to achieve maximal function and safety  Goals   Patient Goals to go home   LTG Expiration Date 12/11/22   Long Term Goal #1 1) Perform bed mobility mod-I to participate in frequent repositioning and improve skin integrity; 2) Perform functional transfers mod-I to promote I with toileting and OOB mobility; 3) Ambulate 200 feet mod-I with least restrictive device to participate in household and community level mobility; 4) Navigate 1 steps mod-I level in order to safely navigate multiple floors at home   PT Treatment Day 0   Plan   Treatment/Interventions Elevations; Endurance training;Patient/family training;Equipment eval/education; Bed mobility;Gait training;Spoke to nursing   PT Frequency 3-5x/wk   Recommendation   PT Discharge Recommendation (S)  No rehabilitation needs  (anticipate no needs upon d/c)   Equipment Recommended   (none at this time)   AM-PAC Basic Mobility Inpatient   Turning in Bed Without Bedrails 4   Lying on Back to Sitting on Edge of Flat Bed 4   Moving Bed to Chair 4   Standing Up From Chair 3   Walk in Room 3   Climb 3-5 Stairs 2   Basic Mobility Inpatient Raw Score 20   Basic Mobility Standardized Score 43 99   Highest Level Of Mobility   JH-HLM Goal 6: Walk 10 steps or more   JH-HLM Achieved 6: Walk 10 steps or more     The patient's AM-PAC Basic Mobility Inpatient Standardized Score is greater than 42 9, suggesting this patient may benefit from discharge to home  Please also refer to the recommendation of the Physical Therapist for safe discharge planning        Hiren Agarwal PT, DPT

## 2022-11-27 NOTE — PLAN OF CARE
Problem: PHYSICAL THERAPY ADULT  Goal: Performs mobility at highest level of function for planned discharge setting  See evaluation for individualized goals  Description: Treatment/Interventions: Elevations, Endurance training, Patient/family training, Equipment eval/education, Bed mobility, Gait training, Spoke to nursing  Equipment Recommended:  (none at this time)       See flowsheet documentation for full assessment, interventions and recommendations  Note: Prognosis: Good  Problem List: Decreased endurance, Impaired balance, Decreased mobility, Pain  Assessment: PT completed evaluation of 80year old male admitted to [facility] on 11/25/2022 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 on   Diagnoses include right sided hemothorax and rib fractures (acute mildly displaced fracture of 10th rib posteriorly and non-displaced fracture of 11th rib)  Patient currently has chest tube to suction  Current status instabilities include pain (7/10 with coughing/pressure), chest tube, falls risk, bed/chair alarms, and continuous O2/HR monitoring  PMH is significant for knee arthroscopy, hernia repair, and pacemaker  Per patient, prior to this admission he resided alone in one story home (2 [address]rystin from front or 10 steps from garage- patient normally goes in through garage)  At his baseline he is I with mobility (no use of AD), ADLs, and iADLS  Works full time in [place] daily at 3am to get to work at 4  +   Has supportive family nearby  Current impairments include pain, decreased activity tolerance, precautions to assist with healing of rib fractures (limit bending, twisting, lifting), and gait deviations  During PT evaluation patient was able to perform bed moblity (w/ HOB elevated) S level- recommended use of recliner PRN upon d/c home  He was S level for sit<-->stand transfers and short distance ambulation  He walked 5 feet x 4 (limited by chest tube to suction)   Mildly unsteady during 1-2 steps and quick requiring VC to reduce speed of mobility for increased safety  Will see for f/u visit to further assess ambulation and stairs  Anticipate no PT needs upon d/c and recommend d/c home with family support PRN  Patient will continue to benefit from continued skilled PT this admission to achieve maximal function and safety  PT Discharge Recommendation: (S) No rehabilitation needs (anticipate no needs upon d/c)    See flowsheet documentation for full assessment

## 2022-11-27 NOTE — PLAN OF CARE
Problem: Potential for Falls  Goal: Patient will remain free of falls  Description: INTERVENTIONS:  - Educate patient/family on patient safety including physical limitations  - Instruct patient to call for assistance with activity   - Consult OT/PT to assist with strengthening/mobility   - Keep Call bell within reach  - Keep bed low and locked with side rails adjusted as appropriate  - Keep care items and personal belongings within reach  - Initiate and maintain comfort rounds  - Make Fall Risk Sign visible to staff  - Apply yellow socks and bracelet for high fall risk patients  - Consider moving patient to room near nurses station  Outcome: Progressing     Problem: MOBILITY - ADULT  Goal: Maintain or return to baseline ADL function  Description: INTERVENTIONS:  -  Assess patient's ability to carry out ADLs; assess patient's baseline for ADL function and identify physical deficits which impact ability to perform ADLs (bathing, care of mouth/teeth, toileting, grooming, dressing, etc )  - Assess/evaluate cause of self-care deficits   - Assess range of motion  - Assess patient's mobility; develop plan if impaired  - Assess patient's need for assistive devices and provide as appropriate  - Encourage maximum independence but intervene and supervise when necessary  - Involve family in performance of ADLs  - Assess for home care needs following discharge   - Consider OT consult to assist with ADL evaluation and planning for discharge  - Provide patient education as appropriate  Outcome: Progressing  Goal: Maintains/Returns to pre admission functional level  Description: INTERVENTIONS:  - Perform BMAT or MOVE assessment daily    - Set and communicate daily mobility goal to care team and patient/family/caregiver     - Out of bed for toileting  - Record patient progress and toleration of activity level   Outcome: Progressing     Problem: PAIN - ADULT  Goal: Verbalizes/displays adequate comfort level or baseline comfort level  Description: Interventions:  - Encourage patient to monitor pain and request assistance  - Assess pain using appropriate pain scale  - Administer analgesics based on type and severity of pain and evaluate response  - Implement non-pharmacological measures as appropriate and evaluate response  - Consider cultural and social influences on pain and pain management  - Notify physician/advanced practitioner if interventions unsuccessful or patient reports new pain  Outcome: Progressing     Problem: INFECTION - ADULT  Goal: Absence or prevention of progression during hospitalization  Description: INTERVENTIONS:  - Assess and monitor for signs and symptoms of infection  - Monitor lab/diagnostic results  - Monitor all insertion sites, i e  indwelling lines, tubes, and drains  - Monitor endotracheal if appropriate and nasal secretions for changes in amount and color  - [place] appropriate cooling/warming therapies per order  - Administer medications as ordered  - Instruct and encourage patient and family to use good hand hygiene technique  - Identify and instruct in appropriate isolation precautions for identified infection/condition  Outcome: Progressing  Goal: Absence of fever/infection during neutropenic period  Description: INTERVENTIONS:  - Monitor WBC    Outcome: Progressing     Problem: SAFETY ADULT  Goal: Patient will remain free of falls  Description: INTERVENTIONS:  - Educate patient/family on patient safety including physical limitations  - Instruct patient to call for assistance with activity   - Consult OT/PT to assist with strengthening/mobility   - Keep Call bell within reach  - Keep bed low and locked with side rails adjusted as appropriate  - Keep care items and personal belongings within reach  - Initiate and maintain comfort rounds  - Make Fall Risk Sign visible to staff  - Apply yellow socks and bracelet for high fall risk patients  - Consider moving patient to room near nurses station  Outcome: Progressing  Goal: Maintain or return to baseline ADL function  Description: INTERVENTIONS:  -  Assess patient's ability to carry out ADLs; assess patient's baseline for ADL function and identify physical deficits which impact ability to perform ADLs (bathing, care of mouth/teeth, toileting, grooming, dressing, etc )  - Assess/evaluate cause of self-care deficits   - Assess range of motion  - Assess patient's mobility; develop plan if impaired  - Assess patient's need for assistive devices and provide as appropriate  - Encourage maximum independence but intervene and supervise when necessary  - Involve family in performance of ADLs  - Assess for home care needs following discharge   - Consider OT consult to assist with ADL evaluation and planning for discharge  - Provide patient education as appropriate  Outcome: Progressing  Goal: Maintains/Returns to pre admission functional level  Description: INTERVENTIONS:  - Perform BMAT or MOVE assessment daily    - Set and communicate daily mobility goal to care team and patient/family/caregiver     - Collaborate with rehabilitation services on mobility goals if consulted  - Out of bed for toileting  - Record patient progress and toleration of activity level   Outcome: Progressing     Problem: DISCHARGE PLANNING  Goal: Discharge to home or other facility with appropriate resources  Description: INTERVENTIONS:  - Identify barriers to discharge w/patient and caregiver  - Arrange for needed discharge resources and transportation as appropriate  - Identify discharge learning needs (meds, wound care, etc )  - Arrange for interpretive services to assist at discharge as needed  - Refer to Case Management Department for coordinating discharge planning if the patient needs post-hospital services based on physician/advanced practitioner order or complex needs related to functional status, cognitive ability, or social support system  Outcome: Progressing     Problem: Knowledge Deficit  Goal: Patient/family/caregiver demonstrates understanding of disease process, treatment plan, medications, and discharge instructions  Description: Complete learning assessment and assess knowledge base    Interventions:  - Provide teaching at level of understanding  - Provide teaching via preferred learning methods  Outcome: Progressing     Problem: CARDIOVASCULAR - ADULT  Goal: Maintains optimal cardiac output and hemodynamic stability  Description: INTERVENTIONS:  - Monitor I/O, vital signs and rhythm  - Monitor for S/S and trends of decreased cardiac output  - Administer and titrate ordered vasoactive medications to optimize hemodynamic stability  - Assess quality of pulses, skin color and temperature  - Assess for signs of decreased coronary artery perfusion  - Instruct patient to report change in severity of symptoms  Outcome: Progressing  Goal: Absence of cardiac dysrhythmias or at baseline rhythm  Description: INTERVENTIONS:  - Continuous cardiac monitoring, vital signs, obtain 12 lead EKG if ordered  - Administer antiarrhythmic and heart rate control medications as ordered  - Monitor electrolytes and administer replacement therapy as ordered  Outcome: Progressing     Problem: RESPIRATORY - ADULT  Goal: Achieves optimal ventilation and oxygenation  Description: INTERVENTIONS:  - Assess for changes in respiratory status  - Assess for changes in mentation and behavior  - Position to facilitate oxygenation and minimize respiratory effort  - Oxygen administered by appropriate delivery if ordered  - Initiate smoking cessation education as indicated  - Encourage broncho-pulmonary hygiene including cough, deep breathe, Incentive Spirometry  - Assess the need for suctioning and aspirate as needed  - Assess and instruct to report SOB or any respiratory difficulty  - Respiratory Therapy support as indicated  Outcome: Progressing     Problem: MUSCULOSKELETAL - ADULT  Goal: Maintain or return mobility to safest level of function  Description: INTERVENTIONS:  - Assess patient's ability to carry out ADLs; assess patient's baseline for ADL function and identify physical deficits which impact ability to perform ADLs (bathing, care of mouth/teeth, toileting, grooming, dressing, etc )  - Assess/evaluate cause of self-care deficits   - Assess range of motion  - Assess patient's mobility  - Assess patient's need for assistive devices and provide as appropriate  - Encourage maximum independence but intervene and supervise when necessary  - Involve family in performance of ADLs  - Assess for home care needs following discharge   - Consider OT consult to assist with ADL evaluation and planning for discharge  - Provide patient education as appropriate  Outcome: Progressing  Goal: Maintain proper alignment of affected body part  Description: INTERVENTIONS:  - Support, maintain and protect limb and body alignment  - Provide patient/ family with appropriate education  Outcome: Progressing     Problem: HEMATOLOGIC - ADULT  Goal: Maintains hematologic stability  Description: INTERVENTIONS  - Assess for signs and symptoms of bleeding or hemorrhage  - Monitor labs  - Administer supportive blood products/factors as ordered and appropriate  Outcome: Progressing     Problem: Prexisting or High Potential for Compromised Skin Integrity  Goal: Skin integrity is maintained or improved  Description: INTERVENTIONS:  - Identify patients at risk for skin breakdown  - Assess and monitor skin integrity  - Assess and monitor nutrition and hydration status  - Monitor labs   - Assess for incontinence   - Turn and reposition patient  - Assist with mobility/ambulation  - Relieve pressure over bony prominences  - Avoid friction and shearing  - Provide appropriate hygiene as needed including keeping skin clean and dry  - Evaluate need for skin moisturizer/barrier cream  - Collaborate with interdisciplinary team   - Patient/family teaching  - Consider wound care consult   Outcome: Progressing

## 2022-11-27 NOTE — NURSING NOTE
Right sided chest tube noted to now have a small air leak, which was not present during assessment at start of 7pm shift  Chest tube maintaining -20cm suction  Sanguinous drainage noted in tubing, with some small clots  SpO2 within patient's normal limits on room air  Lung sounds clear/diminished on right side, unchanged from previous assessment  Chest tube dressing taken down; site is CDI  New xeroform/drain sponges/abd/foam tape applied  Julisa First with trauma notified

## 2022-11-28 ENCOUNTER — APPOINTMENT (INPATIENT)
Dept: RADIOLOGY | Facility: HOSPITAL | Age: 87
End: 2022-11-28

## 2022-11-28 LAB — GLUCOSE SERPL-MCNC: 110 MG/DL (ref 65–140)

## 2022-11-28 RX ORDER — METOPROLOL TARTRATE 50 MG/1
50 TABLET, FILM COATED ORAL EVERY 12 HOURS SCHEDULED
Status: COMPLETED | OUTPATIENT
Start: 2022-11-28 | End: 2022-11-28

## 2022-11-28 RX ORDER — POLYETHYLENE GLYCOL 3350 17 G/17G
17 POWDER, FOR SOLUTION ORAL DAILY
Status: DISCONTINUED | OUTPATIENT
Start: 2022-11-28 | End: 2022-12-01 | Stop reason: HOSPADM

## 2022-11-28 RX ORDER — GABAPENTIN 100 MG/1
100 CAPSULE ORAL 2 TIMES DAILY
Status: DISCONTINUED | OUTPATIENT
Start: 2022-11-28 | End: 2022-12-01 | Stop reason: HOSPADM

## 2022-11-28 RX ORDER — METOPROLOL SUCCINATE 100 MG/1
100 TABLET, EXTENDED RELEASE ORAL DAILY
Status: DISCONTINUED | OUTPATIENT
Start: 2022-11-29 | End: 2022-12-01 | Stop reason: HOSPADM

## 2022-11-28 RX ORDER — IPRATROPIUM BROMIDE AND ALBUTEROL SULFATE 2.5; .5 MG/3ML; MG/3ML
3 SOLUTION RESPIRATORY (INHALATION) EVERY 6 HOURS PRN
Status: DISCONTINUED | OUTPATIENT
Start: 2022-11-28 | End: 2022-11-28

## 2022-11-28 RX ORDER — METHOCARBAMOL 500 MG/1
250 TABLET, FILM COATED ORAL 2 TIMES DAILY
Status: DISCONTINUED | OUTPATIENT
Start: 2022-11-28 | End: 2022-12-01 | Stop reason: HOSPADM

## 2022-11-28 RX ORDER — GUAIFENESIN 600 MG/1
600 TABLET, EXTENDED RELEASE ORAL EVERY 12 HOURS SCHEDULED
Status: DISCONTINUED | OUTPATIENT
Start: 2022-11-28 | End: 2022-12-01 | Stop reason: HOSPADM

## 2022-11-28 RX ORDER — OXYCODONE HYDROCHLORIDE 5 MG/1
2.5 TABLET ORAL EVERY 6 HOURS PRN
Status: DISCONTINUED | OUTPATIENT
Start: 2022-11-28 | End: 2022-12-01 | Stop reason: HOSPADM

## 2022-11-28 RX ADMIN — ACETAMINOPHEN 650 MG: 325 TABLET ORAL at 17:02

## 2022-11-28 RX ADMIN — SENNOSIDES AND DOCUSATE SODIUM 1 TABLET: 8.6; 5 TABLET ORAL at 08:26

## 2022-11-28 RX ADMIN — APIXABAN 5 MG: 5 TABLET, FILM COATED ORAL at 08:27

## 2022-11-28 RX ADMIN — Medication 100 MG: at 08:26

## 2022-11-28 RX ADMIN — ACETAMINOPHEN 650 MG: 325 TABLET ORAL at 11:32

## 2022-11-28 RX ADMIN — GUAIFENESIN 600 MG: 600 TABLET, EXTENDED RELEASE ORAL at 08:47

## 2022-11-28 RX ADMIN — GABAPENTIN 100 MG: 100 CAPSULE ORAL at 17:02

## 2022-11-28 RX ADMIN — MENTHOL, METHYL SALICYLATE: 10; 15 CREAM TOPICAL at 08:33

## 2022-11-28 RX ADMIN — APIXABAN 5 MG: 5 TABLET, FILM COATED ORAL at 17:02

## 2022-11-28 RX ADMIN — GABAPENTIN 100 MG: 300 CAPSULE ORAL at 08:26

## 2022-11-28 RX ADMIN — MENTHOL, METHYL SALICYLATE: 10; 15 CREAM TOPICAL at 21:11

## 2022-11-28 RX ADMIN — METOPROLOL TARTRATE 50 MG: 50 TABLET, FILM COATED ORAL at 21:11

## 2022-11-28 RX ADMIN — SENNOSIDES AND DOCUSATE SODIUM 1 TABLET: 8.6; 5 TABLET ORAL at 17:02

## 2022-11-28 RX ADMIN — METHOCARBAMOL 250 MG: 500 TABLET ORAL at 17:02

## 2022-11-28 RX ADMIN — MENTHOL, METHYL SALICYLATE: 10; 15 CREAM TOPICAL at 17:00

## 2022-11-28 RX ADMIN — PRAVASTATIN SODIUM 40 MG: 40 TABLET ORAL at 08:26

## 2022-11-28 RX ADMIN — METHOCARBAMOL 500 MG: 500 TABLET ORAL at 08:26

## 2022-11-28 RX ADMIN — POLYETHYLENE GLYCOL 3350 17 G: 17 POWDER, FOR SOLUTION ORAL at 08:47

## 2022-11-28 RX ADMIN — GUAIFENESIN 600 MG: 600 TABLET, EXTENDED RELEASE ORAL at 21:11

## 2022-11-28 RX ADMIN — ACETAMINOPHEN 650 MG: 325 TABLET ORAL at 05:14

## 2022-11-28 NOTE — PLAN OF CARE
Problem: Potential for Falls  Goal: Patient will remain free of falls  Description: INTERVENTIONS:  - Educate patient/family on patient safety including physical limitations  - Instruct patient to call for assistance with activity   - Consult OT/PT to assist with strengthening/mobility   - Keep Call bell within reach  - Keep bed low and locked with side rails adjusted as appropriate  - Keep care items and personal belongings within reach  - Initiate and maintain comfort rounds  - Make Fall Risk Sign visible to staff  - Apply yellow socks and bracelet for high fall risk patients  - Consider moving patient to room near nurses station  Outcome: Progressing     Problem: MOBILITY - ADULT  Goal: Maintain or return to baseline ADL function  Description: INTERVENTIONS:  -  Assess patient's ability to carry out ADLs; assess patient's baseline for ADL function and identify physical deficits which impact ability to perform ADLs (bathing, care of mouth/teeth, toileting, grooming, dressing, etc )  - Assess/evaluate cause of self-care deficits   - Assess range of motion  - Assess patient's mobility; develop plan if impaired  - Assess patient's need for assistive devices and provide as appropriate  - Encourage maximum independence but intervene and supervise when necessary  - Involve family in performance of ADLs  - Assess for home care needs following discharge   - Consider OT consult to assist with ADL evaluation and planning for discharge  - Provide patient education as appropriate  Outcome: Progressing  Goal: Maintains/Returns to pre admission functional level  Description: INTERVENTIONS:  - Perform BMAT or MOVE assessment daily    - Set and communicate daily mobility goal to care team and patient/family/caregiver     - Collaborate with rehabilitation services on mobility goals if consulted  - Out of bed for toileting  - Record patient progress and toleration of activity level   Outcome: Progressing     Problem: PAIN - ADULT  Goal: Verbalizes/displays adequate comfort level or baseline comfort level  Description: Interventions:  - Encourage patient to monitor pain and request assistance  - Assess pain using appropriate pain scale  - Administer analgesics based on type and severity of pain and evaluate response  - Implement non-pharmacological measures as appropriate and evaluate response  - Consider cultural and social influences on pain and pain management  - Notify physician/advanced practitioner if interventions unsuccessful or patient reports new pain  Outcome: Progressing     Problem: INFECTION - ADULT  Goal: Absence or prevention of progression during hospitalization  Description: INTERVENTIONS:  - Assess and monitor for signs and symptoms of infection  - Monitor lab/diagnostic results  - Monitor all insertion sites, i e  indwelling lines, tubes, and drains  - Monitor endotracheal if appropriate and nasal secretions for changes in amount and color  - [place] appropriate cooling/warming therapies per order  - Administer medications as ordered  - Instruct and encourage patient and family to use good hand hygiene technique  - Identify and instruct in appropriate isolation precautions for identified infection/condition  Outcome: Progressing  Goal: Absence of fever/infection during neutropenic period  Description: INTERVENTIONS:  - Monitor WBC    Outcome: Progressing     Problem: SAFETY ADULT  Goal: Patient will remain free of falls  Description: INTERVENTIONS:  - Educate patient/family on patient safety including physical limitations  - Instruct patient to call for assistance with activity   - Consult OT/PT to assist with strengthening/mobility   - Keep Call bell within reach  - Keep bed low and locked with side rails adjusted as appropriate  - Keep care items and personal belongings within reach  - Initiate and maintain comfort rounds  - Make Fall Risk Sign visible to staff  - Apply yellow socks and bracelet for high fall risk patients  - Consider moving patient to room near nurses station  Outcome: Progressing  Goal: Maintain or return to baseline ADL function  Description: INTERVENTIONS:  -  Assess patient's ability to carry out ADLs; assess patient's baseline for ADL function and identify physical deficits which impact ability to perform ADLs (bathing, care of mouth/teeth, toileting, grooming, dressing, etc )  - Assess/evaluate cause of self-care deficits   - Assess range of motion  - Assess patient's mobility; develop plan if impaired  - Assess patient's need for assistive devices and provide as appropriate  - Encourage maximum independence but intervene and supervise when necessary  - Involve family in performance of ADLs  - Assess for home care needs following discharge   - Consider OT consult to assist with ADL evaluation and planning for discharge  - Provide patient education as appropriate  Outcome: Progressing  Goal: Maintains/Returns to pre admission functional level  Description: INTERVENTIONS:  - Perform BMAT or MOVE assessment daily    - Set and communicate daily mobility goal to care team and patient/family/caregiver     - Collaborate with rehabilitation services on mobility goals if consulted  - Out of bed for toileting  - Record patient progress and toleration of activity level   Outcome: Progressing     Problem: DISCHARGE PLANNING  Goal: Discharge to home or other facility with appropriate resources  Description: INTERVENTIONS:  - Identify barriers to discharge w/patient and caregiver  - Arrange for needed discharge resources and transportation as appropriate  - Identify discharge learning needs (meds, wound care, etc )  - Arrange for interpretive services to assist at discharge as needed  - Refer to Case Management Department for coordinating discharge planning if the patient needs post-hospital services based on physician/advanced practitioner order or complex needs related to functional status, cognitive ability, or social support system  Outcome: Progressing     Problem: Knowledge Deficit  Goal: Patient/family/caregiver demonstrates understanding of disease process, treatment plan, medications, and discharge instructions  Description: Complete learning assessment and assess knowledge base    Interventions:  - Provide teaching at level of understanding  - Provide teaching via preferred learning methods  Outcome: Progressing     Problem: CARDIOVASCULAR - ADULT  Goal: Maintains optimal cardiac output and hemodynamic stability  Description: INTERVENTIONS:  - Monitor I/O, vital signs and rhythm  - Monitor for S/S and trends of decreased cardiac output  - Administer and titrate ordered vasoactive medications to optimize hemodynamic stability  - Assess quality of pulses, skin color and temperature  - Assess for signs of decreased coronary artery perfusion  - Instruct patient to report change in severity of symptoms  Outcome: Progressing  Goal: Absence of cardiac dysrhythmias or at baseline rhythm  Description: INTERVENTIONS:  - Continuous cardiac monitoring, vital signs, obtain 12 lead EKG if ordered  - Administer antiarrhythmic and heart rate control medications as ordered  - Monitor electrolytes and administer replacement therapy as ordered  Outcome: Progressing     Problem: RESPIRATORY - ADULT  Goal: Achieves optimal ventilation and oxygenation  Description: INTERVENTIONS:  - Assess for changes in respiratory status  - Assess for changes in mentation and behavior  - Position to facilitate oxygenation and minimize respiratory effort  - Oxygen administered by appropriate delivery if ordered  - Initiate smoking cessation education as indicated  - Encourage broncho-pulmonary hygiene including cough, deep breathe, Incentive Spirometry  - Assess the need for suctioning and aspirate as needed  - Assess and instruct to report SOB or any respiratory difficulty  - Respiratory Therapy support as indicated  Outcome: Progressing     Problem: MUSCULOSKELETAL - ADULT  Goal: Maintain or return mobility to safest level of function  Description: INTERVENTIONS:  - Assess patient's ability to carry out ADLs; assess patient's baseline for ADL function and identify physical deficits which impact ability to perform ADLs (bathing, care of mouth/teeth, toileting, grooming, dressing, etc )  - Assess/evaluate cause of self-care deficits   - Assess range of motion  - Assess patient's mobility  - Assess patient's need for assistive devices and provide as appropriate  - Encourage maximum independence but intervene and supervise when necessary  - Involve family in performance of ADLs  - Assess for home care needs following discharge   - Consider OT consult to assist with ADL evaluation and planning for discharge  - Provide patient education as appropriate  Outcome: Progressing  Goal: Maintain proper alignment of affected body part  Description: INTERVENTIONS:  - Support, maintain and protect limb and body alignment  - Provide patient/ family with appropriate education  Outcome: Progressing     Problem: Prexisting or High Potential for Compromised Skin Integrity  Goal: Skin integrity is maintained or improved  Description: INTERVENTIONS:  - Identify patients at risk for skin breakdown  - Assess and monitor skin integrity  - Assess and monitor nutrition and hydration status  - Monitor labs   - Assess for incontinence   - Turn and reposition patient  - Assist with mobility/ambulation  - Relieve pressure over bony prominences  - Avoid friction and shearing  - Provide appropriate hygiene as needed including keeping skin clean and dry  - Evaluate need for skin moisturizer/barrier cream  - Collaborate with interdisciplinary team   - Patient/family teaching  - Consider wound care consult   Outcome: Progressing     Problem: HEMATOLOGIC - ADULT  Goal: Maintains hematologic stability  Description: INTERVENTIONS  - Assess for signs and symptoms of bleeding or hemorrhage  - Monitor labs  - Administer supportive blood products/factors as ordered and appropriate  Outcome: Progressing

## 2022-11-28 NOTE — ASSESSMENT & PLAN NOTE
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

## 2022-11-28 NOTE — RESTORATIVE TECHNICIAN NOTE
Restorative Technician Note      Patient Name: Porter Halsted     Restorative Tech Visit Date: 11/28/22  Note Type: Mobility  Patient Position Upon Consult: Supine  Activity Performed: Ambulated  Assistive Device: Other (Comment) (no AD; cues for speed)  Education Provided: Yes  Patient Position at End of Consult: Bedside chair;  All needs within reach    Dayna GONZALEZT, Restorative Technician

## 2022-11-28 NOTE — PROGRESS NOTES
Progress Note - Acute Pain Service    Remi Swanson 80 y o  male MRN: [id number]  Unit/Bed#: [facility] 803-01 Encounter: [id number]      Assessment:   Principal Problem:    Hemothorax  Active Problems:    Atrial fibrillation ([facility] [address] )    Cardiomyopathy ([facility] [address] )    Hyperlipidemia    Hypertension    Acute pain due to trauma    Closed fracture of multiple ribs of right side    Remi Swanson is a 80 y o  male who was a past medical history of cardiomyopathy, pacemaker, hypertension, AFib on Eliquis who presented after mechanical fall with multiple right-sided rib fractures and hemothorax status post chest tube placement  SpO2 92 % on room air, incentive spirometer 1000 mL, able to take a deep breath, no acute respiratory distress  Last dose of Eliquis was 11/28/2022  No interventional pain modalities recommended with stable pain control and respiratory status  Plan:   Multimodal analgesia:  · Tylenol 650 mg every 6 hours scheduled  · Decrease gabapentin to 100 mg twice a day  · Estimated Creatinine Clearance: 45 2 mL/min (by C-G formula based on SCr of 1 mg/dL)  · Gabapentin dose decreased based on age and current renal function  · Decreased Robaxin to 250 mg twice a day scheduled  · Hold for sedation  · Discontinue oxycodone 10 mg dose  · Discontinue oxycodone 5 mg dose  · Add oxycodone 2 5 mg every 6 hours as needed for moderate or severe pain    Bowel Regimen:  · MiraLax daily  · Senokot S 1 tablet twice a day    Pain stable on current medication regimen, no acute respiratory distress  Respiratory status also stable  De-escalated analgesic regimen as outlined above based on age and stable pain control  Treatment recommendations and plan of care discussed with bedside nursing staff and Primary Care Service  APS will sign off at this time  Thank you for the consult   All opioids and other analgesics to be written at discretion of primary team  Please contact Acute Pain Service - [facility] via [employer] from [phone] with additional questions or concerns  See Yulisa Pisano for additional contacts and after hours information  Pain History  Current pain location(s): right chest wall  Pain Scale:   0-6  Quality:  Sharp with coughing  24 hour history:  Patient sitting in chair, no acute distress  Minimal right chest wall pain at rest, pain increases with coughing  Denied shortness of breath  Has not required any opioids as needed for pain control  Opioid requirement previous 24 hours: None    Meds/Allergies   all current active meds have been reviewed and current meds:   Current Facility-Administered Medications   Medication Dose Route Frequency   • acetaminophen (TYLENOL) tablet 650 mg  650 mg Oral Q6H [address]   • apixaban (ELIQUIS) tablet 5 mg  5 mg Oral BID   • co-enzyme Q-10 capsule 100 mg  100 mg Oral Daily   • gabapentin (NEURONTIN) capsule 100 mg  100 mg Oral TID   • guaiFENesin (MUCINEX) 12 hr tablet 600 mg  600 mg Oral Q12H [address]   • ipratropium-albuterol (DUO-NEB) 0 5-2 5 mg/3 mL inhalation solution 3 mL  3 mL Nebulization Q6H PRN   • menthol-methyl salicylate (BENGAY) 43-37 % cream   Apply externally TID   • methocarbamol (ROBAXIN) tablet 500 mg  500 mg Oral BID   • metoprolol tartrate (LOPRESSOR) tablet 50 mg  50 mg Oral Q12H [address]   • oxyCODONE (ROXICODONE) immediate release tablet 10 mg  10 mg Oral Q6H PRN   • oxyCODONE (ROXICODONE) IR tablet 5 mg  5 mg Oral Q6H PRN   • polyethylene glycol (MIRALAX) packet 17 g  17 g Oral Daily   • pravastatin (PRAVACHOL) tablet 40 mg  40 mg Oral Daily   • senna-docusate sodium (SENOKOT S) 8 6-50 mg per tablet 1 tablet  1 tablet Oral BID       No Known Allergies    Objective     Temp:  [97 9 °F (36 6 °C)-98 4 °F (36 9 °C)] 97 9 °F (36 6 °C)  HR:  [82-89] 82  Resp:  [12-22] 12  BP: (109-171)/(69-76) 109/69    Physical Exam  Vitals reviewed  Constitutional:       General: He is awake  He is not in acute distress  Appearance: Normal appearance   He is not ill-appearing, toxic-appearing or diaphoretic  HENT:      Head: Normocephalic and atraumatic  Nose: Nose normal  No congestion  Mouth/Throat:      Mouth: Mucous membranes are moist    Eyes:      Extraocular Movements: Extraocular movements intact  Cardiovascular:      Rate and Rhythm: Normal rate  Pulmonary:      Effort: Pulmonary effort is normal  No tachypnea, bradypnea or respiratory distress  Breath sounds: Examination of the right-upper field reveals rhonchi  Examination of the right-middle field reveals rhonchi  Examination of the right-lower field reveals rhonchi  Decreased breath sounds and rhonchi present  Skin:     General: Skin is warm and dry  Coloration: Skin is not pale  Findings: No rash  Neurological:      Mental Status: He is alert and oriented to person, place, and time  Mental status is at baseline  Psychiatric:         Attention and Perception: Attention normal          Mood and Affect: Mood normal  Mood is not anxious  Speech: Speech normal          Behavior: Behavior normal  Behavior is not agitated  Behavior is cooperative  Lab Results:   Results from last 7 days   Lab Units 11/27/22  0514   WBC Thousand/uL 7 76   HEMOGLOBIN g/dL 10 9*   HEMATOCRIT % 35 1*   PLATELETS Thousands/uL 188      Results from last 7 days   Lab Units 11/27/22  0514   POTASSIUM mmol/L 4 1   CHLORIDE mmol/L 109*   CO2 mmol/L 26   BUN mg/dL 18   CREATININE mg/dL 1 00   CALCIUM mg/dL 9 4       Imaging Studies: I have personally reviewed pertinent reports  Please note that the APS provides consultative services regarding pain management only  With the exception of ketamine and epidural infusions and except when indicated, final decisions regarding starting or changing doses of analgesic medications are at the discretion of the consulting service  Off hours consultation and/or medication management is generally not available      RON Ayala  Acute Pain Service

## 2022-11-28 NOTE — ASSESSMENT & PLAN NOTE
· Secondary to rib fractures  · APS consulted note appreciated  · Pain is currently under control with current multimodal pain regimen-patient has not required any IV pain medicine

## 2022-11-28 NOTE — PLAN OF CARE
Problem: Potential for Falls  Goal: Patient will remain free of falls  Description: INTERVENTIONS:  - Educate patient/family on patient safety including physical limitations  - Instruct patient to call for assistance with activity   - Consult OT/PT to assist with strengthening/mobility   - Keep Call bell within reach  - Keep bed low and locked with side rails adjusted as appropriate  - Keep care items and personal belongings within reach  - Initiate and maintain comfort rounds  - Make Fall Risk Sign visible to staff  - Apply yellow socks and bracelet for high fall risk patients  - Consider moving patient to room near nurses station  Outcome: Progressing     Problem: MOBILITY - ADULT  Goal: Maintain or return to baseline ADL function  Description: INTERVENTIONS:  -  Assess patient's ability to carry out ADLs; assess patient's baseline for ADL function and identify physical deficits which impact ability to perform ADLs (bathing, care of mouth/teeth, toileting, grooming, dressing, etc )  - Assess/evaluate cause of self-care deficits   - Assess range of motion  - Assess patient's mobility; develop plan if impaired  - Assess patient's need for assistive devices and provide as appropriate  - Encourage maximum independence but intervene and supervise when necessary  - Involve family in performance of ADLs  - Assess for home care needs following discharge   - Consider OT consult to assist with ADL evaluation and planning for discharge  - Provide patient education as appropriate  Outcome: Progressing  Goal: Maintains/Returns to pre admission functional level  Description: INTERVENTIONS:  - Perform BMAT or MOVE assessment daily    - Set and communicate daily mobility goal to care team and patient/family/caregiver     - Out of bed for toileting  - Record patient progress and toleration of activity level   Outcome: Progressing     Problem: PAIN - ADULT  Goal: Verbalizes/displays adequate comfort level or baseline comfort level  Description: Interventions:  - Encourage patient to monitor pain and request assistance  - Assess pain using appropriate pain scale  - Administer analgesics based on type and severity of pain and evaluate response  - Implement non-pharmacological measures as appropriate and evaluate response  - Consider cultural and social influences on pain and pain management  - Notify physician/advanced practitioner if interventions unsuccessful or patient reports new pain  Outcome: Progressing     Problem: INFECTION - ADULT  Goal: Absence or prevention of progression during hospitalization  Description: INTERVENTIONS:  - Assess and monitor for signs and symptoms of infection  - Monitor lab/diagnostic results  - Monitor all insertion sites, i e  indwelling lines, tubes, and drains  - Monitor endotracheal if appropriate and nasal secretions for changes in amount and color  - [place] appropriate cooling/warming therapies per order  - Administer medications as ordered  - Instruct and encourage patient and family to use good hand hygiene technique  - Identify and instruct in appropriate isolation precautions for identified infection/condition  Outcome: Progressing  Goal: Absence of fever/infection during neutropenic period  Description: INTERVENTIONS:  - Monitor WBC    Outcome: Progressing     Problem: SAFETY ADULT  Goal: Patient will remain free of falls  Description: INTERVENTIONS:  - Educate patient/family on patient safety including physical limitations  - Instruct patient to call for assistance with activity   - Consult OT/PT to assist with strengthening/mobility   - Keep Call bell within reach  - Keep bed low and locked with side rails adjusted as appropriate  - Keep care items and personal belongings within reach  - Initiate and maintain comfort rounds  - Make Fall Risk Sign visible to staff  - Apply yellow socks and bracelet for high fall risk patients  - Consider moving patient to room near nurses station  Outcome: Progressing  Goal: Maintain or return to baseline ADL function  Description: INTERVENTIONS:  -  Assess patient's ability to carry out ADLs; assess patient's baseline for ADL function and identify physical deficits which impact ability to perform ADLs (bathing, care of mouth/teeth, toileting, grooming, dressing, etc )  - Assess/evaluate cause of self-care deficits   - Assess range of motion  - Assess patient's mobility; develop plan if impaired  - Assess patient's need for assistive devices and provide as appropriate  - Encourage maximum independence but intervene and supervise when necessary  - Involve family in performance of ADLs  - Assess for home care needs following discharge   - Consider OT consult to assist with ADL evaluation and planning for discharge  - Provide patient education as appropriate  Outcome: Progressing  Goal: Maintains/Returns to pre admission functional level  Description: INTERVENTIONS:  - Perform BMAT or MOVE assessment daily    - Set and communicate daily mobility goal to care team and patient/family/caregiver     - Collaborate with rehabilitation services on mobility goals if consulted  - Out of bed for toileting  - Record patient progress and toleration of activity level   Outcome: Progressing     Problem: DISCHARGE PLANNING  Goal: Discharge to home or other facility with appropriate resources  Description: INTERVENTIONS:  - Identify barriers to discharge w/patient and caregiver  - Arrange for needed discharge resources and transportation as appropriate  - Identify discharge learning needs (meds, wound care, etc )  - Arrange for interpretive services to assist at discharge as needed  - Refer to Case Management Department for coordinating discharge planning if the patient needs post-hospital services based on physician/advanced practitioner order or complex needs related to functional status, cognitive ability, or social support system  Outcome: Progressing     Problem: Knowledge Deficit  Goal: Patient/family/caregiver demonstrates understanding of disease process, treatment plan, medications, and discharge instructions  Description: Complete learning assessment and assess knowledge base    Interventions:  - Provide teaching at level of understanding  - Provide teaching via preferred learning methods  Outcome: Progressing     Problem: CARDIOVASCULAR - ADULT  Goal: Maintains optimal cardiac output and hemodynamic stability  Description: INTERVENTIONS:  - Monitor I/O, vital signs and rhythm  - Monitor for S/S and trends of decreased cardiac output  - Administer and titrate ordered vasoactive medications to optimize hemodynamic stability  - Assess quality of pulses, skin color and temperature  - Assess for signs of decreased coronary artery perfusion  - Instruct patient to report change in severity of symptoms  Outcome: Progressing  Goal: Absence of cardiac dysrhythmias or at baseline rhythm  Description: INTERVENTIONS:  - Continuous cardiac monitoring, vital signs, obtain 12 lead EKG if ordered  - Administer antiarrhythmic and heart rate control medications as ordered  - Monitor electrolytes and administer replacement therapy as ordered  Outcome: Progressing     Problem: RESPIRATORY - ADULT  Goal: Achieves optimal ventilation and oxygenation  Description: INTERVENTIONS:  - Assess for changes in respiratory status  - Assess for changes in mentation and behavior  - Position to facilitate oxygenation and minimize respiratory effort  - Oxygen administered by appropriate delivery if ordered  - Initiate smoking cessation education as indicated  - Encourage broncho-pulmonary hygiene including cough, deep breathe, Incentive Spirometry  - Assess the need for suctioning and aspirate as needed  - Assess and instruct to report SOB or any respiratory difficulty  - Respiratory Therapy support as indicated  Outcome: Progressing     Problem: MUSCULOSKELETAL - ADULT  Goal: Maintain or return mobility to safest level of function  Description: INTERVENTIONS:  - Assess patient's ability to carry out ADLs; assess patient's baseline for ADL function and identify physical deficits which impact ability to perform ADLs (bathing, care of mouth/teeth, toileting, grooming, dressing, etc )  - Assess/evaluate cause of self-care deficits   - Assess range of motion  - Assess patient's mobility  - Assess patient's need for assistive devices and provide as appropriate  - Encourage maximum independence but intervene and supervise when necessary  - Involve family in performance of ADLs  - Assess for home care needs following discharge   - Consider OT consult to assist with ADL evaluation and planning for discharge  - Provide patient education as appropriate  Outcome: Progressing  Goal: Maintain proper alignment of affected body part  Description: INTERVENTIONS:  - Support, maintain and protect limb and body alignment  - Provide patient/ family with appropriate education  Outcome: Progressing     Problem: HEMATOLOGIC - ADULT  Goal: Maintains hematologic stability  Description: INTERVENTIONS  - Assess for signs and symptoms of bleeding or hemorrhage  - Monitor labs  - Administer supportive blood products/factors as ordered and appropriate  Outcome: Progressing     Problem: Prexisting or High Potential for Compromised Skin Integrity  Goal: Skin integrity is maintained or improved  Description: INTERVENTIONS:  - Identify patients at risk for skin breakdown  - Assess and monitor skin integrity  - Assess and monitor nutrition and hydration status  - Monitor labs   - Assess for incontinence   - Turn and reposition patient  - Assist with mobility/ambulation  - Relieve pressure over bony prominences  - Avoid friction and shearing  - Provide appropriate hygiene as needed including keeping skin clean and dry  - Evaluate need for skin moisturizer/barrier cream  - Collaborate with interdisciplinary team   - Patient/family teaching  - Consider wound care consult   Outcome: Progressing

## 2022-11-28 NOTE — CASE MANAGEMENT
Case Management Discharge Planning Note    Patient name Enoc Host  Location [facility] 685-28 MRN [id number]  : 10/21/1933 Date 2022       Current Admission Date: 2022  Current Admission Diagnosis:Hemothorax   Patient Active Problem List    Diagnosis Date Noted   • Hemothorax 2022   • Acute pain due to trauma 2022   • Closed fracture of multiple ribs of right side 2022   • COVID-19 2022   • Bleeding from varicose vein 2021   • Hematoma 2018   • AV block 10/04/2017   • Other microscopic hematuria 2017   • Bilateral impacted cerumen 2017   • Age-related macular degeneration 10/19/2016   • Bilateral dry eyes 10/19/2016   • Cataract 10/19/2015   • Mitral prolapse 10/19/2015   • Carcinoma in situ of skin 2015   • Disorder of skin 2014   • Cardiomyopathy ([facility] [address] ) 2013   • Hypertension 2013   • Hearing loss 2012   • Aortic regurgitation 2012   • Atrial fibrillation ([address] ) 2012   • Hyperlipidemia 2012   • Benign prostatic hyperplasia 2012      LOS (days): 3  Geometric Mean LOS (GMLOS) (days):   Days to GMLOS:     OBJECTIVE:  Risk of Unplanned Readmission Score: 14 51         Current admission status: Inpatient   Preferred Pharmacy:   [facility] [address]IFF[address] COYLE [address] [address]  Phone: [phone] Fax: [phone]    [facility] [address] [facility] [id number]  Phone: [phone] Fax: [phone]    Primary Care Provider: Aniyah Tapia DO    Primary Insurance: Neri Wu [facility]  Secondary Insurance:     DISCHARGE DETAILS:    Pt seen by OT/PT and cleared for a home d/c with no therapy needs  CM will continue to follow for possible further d/c recs   Pt will need chest tube removed prior to d/c

## 2022-11-28 NOTE — ASSESSMENT & PLAN NOTE
· Right sided rib fractures 10 mildly displaced, 11 nondisplaced   Rib fracture protocol  Pulmonary toilette/IS  Pulling 1-1 2L on IS today  Patient having a large amount of thick sputum over the past 24 hours--patient is able to clear with strong cough, which she has been able to tolerate  Will add Mucinex and DuoNeb to assist   Multi modal analgesia  APS consulted note appreciated  Repeat CXR stable as of 11/28-continue to reassess daily, plan to remove chest tube once output decreases  295 mL over the past 24 hours    · PT/OT

## 2022-11-28 NOTE — PLAN OF CARE
Problem: PHYSICAL THERAPY ADULT  Goal: Performs mobility at highest level of function for planned discharge setting  See evaluation for individualized goals  Description: Treatment/Interventions: Elevations, Endurance training, Patient/family training, Equipment eval/education, Bed mobility, Gait training, Spoke to nursing  Equipment Recommended:  (none at this time)       See flowsheet documentation for full assessment, interventions and recommendations  Outcome: Adequate for Discharge  Note: Prognosis: Good  Problem List: Pain  Assessment: PT initiated treatment session in order to assist patient in achieving goals to improve transfers, gait training, and overall activity tolerance  Patient demonstrated progress toward achieving functional mobility goals as evidenced by ability to ambulate both household and community distances without use of AD  Patient remains with chest tube and requires assistance managing this while mobilizing  He walked 30 feet w/ RW + 120 feet w/o AD and negotiated 7 steps without difficulty  Patient demonstrates safe and effective mobility and denies questions/concerns about d/c home  Recommend d/c home with inc  Family assist PRN  D/C inpt PT  Recommend ongoing mobility with RN/restorative staff  PT Discharge Recommendation: (S) No rehabilitation needs (return home w/ inc  assist from family PRN)    See flowsheet documentation for full assessment

## 2022-11-28 NOTE — ASSESSMENT & PLAN NOTE
· Secondary to fall on -CT imaging showed right Hemothorax  · 11/25 S/P Thoracostomy tube placement, initially 500 cc drainage  · Placed to water seal on 11/28  Air leak resolved today with troubleshooting external connection--continue to monitor  · Chest tube drained 295 mL over the past 24 hours      · CXR 11/29 pending  · Will plan to remove chest tube once output decreases

## 2022-11-28 NOTE — PHYSICAL THERAPY NOTE
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 on   Diagnoses include right sided hemothorax and rib fractures (acute mildly displaced fracture of 10th rib posteriorly and non-displaced fracture of 11th rib)  Patient currently has chest tube (off suction)   Family/Caregiver Present No   Cognition   Overall Cognitive Status WFL   Arousal/Participation Alert; Cooperative   Subjective   Subjective "I want to go home"   Bed Mobility   Sit to Supine 5  Supervision   Additional items HOB elevated  (S to manage chest tube)   Transfers   Sit to Stand 5  Supervision   Additional items   (S required for therapist to safely manage patient's chest tube)   Stand to Sit 5  Supervision   Additional items   (S required for therapist to safely manage patient's chest tube)   Additional Comments no AD   Ambulation/Elevation   Gait pattern Inconsistent shad   Gait Assistance 5  Supervision   Additional items Assist x 1  (S required for therapist to safely manage patient's chest tube)   Assistive Device Rolling walker;None   Distance 30 feet w/ RW + 120 feet w/o RW   Stair Management Assistance 5  Supervision   Additional items Verbal cues   Stair Management Technique One rail L;Foreward;Nonreciprocal   Number of Stairs 7   Ambulation/Elevation Additional Comments patient was provided with RW initially for ambulation however he did not demonstrate effective use (ambulating too far away from front of walker, pushing walker toward R side)   gait improved when RW removed  he ambulated 120 feet w/o RW, no LOB  S provided for therapist to manage chest tube  VC to recommended reduced speed for improve safety  Balance   Static Sitting Normal   Static Standing Good   Ambulatory Fair   Endurance Deficit   Endurance Deficit Description sats 95-98% on RA, pain with coughing   Activity Tolerance   Activity Tolerance Patient tolerated treatment well   Medical Staff Made Aware f/u with restorative therapist Addie David to recommend ongoing mobility with patient this admission   Nurse Made Aware marsha to see per RN [facility]   Assessment   Prognosis Good   Problem List Pain   Assessment PT initiated treatment session in order to assist patient in achieving goals to improve transfers, gait training, and overall activity tolerance  Patient demonstrated progress toward achieving functional mobility goals as evidenced by ability to ambulate both household and community distances without use of AD  Patient remains with chest tube and requires assistance managing this while mobilizing  He walked 30 feet w/ RW + 120 feet w/o AD and negotiated 7 steps without difficulty  Patient demonstrates safe and effective mobility and denies questions/concerns about d/c home  Recommend d/c home with inc  Family assist PRN  D/C inpt PT  Recommend ongoing mobility with RN/restorative staff  Goals   Patient Goals to go home   PT Treatment Day 1   Plan   Progress (S)  Discontinue PT   Recommendation   PT Discharge Recommendation (S)  No rehabilitation needs  (return home w/ inc  assist from family PRN)   Equipment Recommended   (none)   Additional Comments Patient participated in 10 minutes of education regarding mobility this admission and upon d/c   This included the following: confirmation of home set up for d/c (will perform 10 steps to enter; practiced these today in nonreciprocal manner), support (will continue to reside alone however reports "I have a great family" to assist as needed; dme for d/c (does not require), and recommendation to change positions at least 1x/hour (even if sit<-->stand) and to ambulate 3-5x within home to continue participation in mobility upon d/c  AM-PAC Basic Mobility Inpatient   Turning in Bed Without Bedrails 4   Lying on Back to Sitting on Edge of Flat Bed 4   Moving Bed to Chair 4   Standing Up From Chair 4   Walk in Room 4   Climb 3-5 Stairs 4   Basic Mobility Inpatient Raw Score 24   Basic Mobility Standardized Score 57 68   Highest Level Of Mobility   JH-HLM Goal 8: Walk 250 feet or more   JH-HLM Achieved 7: Walk 25 feet or more     The patient's AM-PAC Basic Mobility Inpatient Standardized Score is greater than 42 9, suggesting this patient may benefit from discharge to home  Please also refer to the recommendation of the Physical Therapist for safe discharge planning      GABY HANNA PT, DPT

## 2022-11-28 NOTE — PROGRESS NOTES
[facility]  Progress Note - Kendra Zeng 10/21/1933, 80 y o  male MRN: [id number]  Unit/Bed#: [facility] 803-01 Encounter: [id number]  Primary Care Provider: Eduardo Grigsby DO   Date and time admitted to hospital: 11/25/2022  5:46 PM    Closed fracture of multiple ribs of right side  Assessment & Plan  · Right sided rib fractures 10 mildly displaced, 11 nondisplaced   Rib fracture protocol  Pulmonary toilette/IS  Pulling 1-1 2L on IS today  Patient having a large amount of thick sputum over the past 24 hours--patient is able to clear with strong cough, which she has been able to tolerate  Will add Mucinex and DuoNeb to assist   Multi modal analgesia  APS consulted note appreciated  Repeat CXR stable as of 11/28-continue to reassess daily, plan to remove chest tube once output decreases  295 mL over the past 24 hours  · PT/OT       Acute pain due to trauma  Assessment & Plan  · Secondary to rib fractures  · APS consulted note appreciated  · Pain is currently under control with current multimodal pain regimen-patient has not required any IV pain medicine  Hypertension  Assessment & Plan  · Continue home metoprolol      Hyperlipidemia  Assessment & Plan  · Continue home pravastatin    Cardiomyopathy [facility])  Assessment & Plan  · History of CMP with last ECHO on 8/21 showing EF 55-60% grade 1 DD, mildly decreased LV filling internal cavity size, mild AI, Moderate TR, Moderate MR with MVP  · Monitor I&O  · Continue PO metoprolol    Atrial fibrillation (HCC)  Assessment & Plan  · Continue home Eliquis and metoprolol  · Patient switched back to his Toprol XL starting 11/29    * Hemothorax  Assessment & Plan  · Secondary to fall on -CT imaging showed right Hemothorax  · 11/25 S/P Thoracostomy tube placement, initially 500 cc drainage  · Placed to water seal on 11/28  Air leak resolved today with troubleshooting external connection--continue to monitor    · Chest tube drained 295 mL over the past 24 hours  · CXR 11/29 pending  · Will plan to remove chest tube once output decreases      Bowel Regimen:  Senna S  VTE Prophylaxis:Sequential compression device (Venodyne)  Eliquis    Disposition:  Pending pain control--PT/OT note no post acute needs    Subjective   Chief Complaint:  “I keep coughing up this stuff”    Subjective:  Patient notes having a large amount of sputum need to be coughed up over the past 24 hours  Denies any associated symptoms including but not limited to fever, chills, aspiration, oral intolerance, shortness of breath, difficulty breathing  He states that he has been able to clear all his chest congestion with coughing  He otherwise notes feeling well  Objective   Vitals:   Temp:  [97 9 °F (36 6 °C)-98 4 °F (36 9 °C)] 97 9 °F (36 6 °C)  HR:  [82-89] 82  Resp:  [12-22] 12  BP: (109-171)/(69-76) 109/69    I/O       11/26 0701  11/27 0700 11/27 0701  11/28 0700 11/28 0701  11/29 0700    P  O  200 400     I V  (mL/kg) 261 3 (3 5)      Total Intake(mL/kg) 461 3 (6 1) 400 (5 3)     Urine (mL/kg/hr) 0 (0) 300 (0 2)     Chest Tube 320 295     Total Output 320 595     Net +141 3 -195            Unmeasured Urine Occurrence 3 x 3 x 1 x    Unmeasured Stool Occurrence   0 x           Physical Exam:   GENERAL APPEARANCE:  No acute distress  NEURO:  GCS is 15  Light touch sensation intact throughout  HEENT: NC/AT  CV: RRR, +2 Radial and DP pulses  LUNGS:  Mainstem rhonchi noted throughout  No orthopnea  No tachypnea  On RA  right-sided rib pain  CT putting out serosanguineous drainage  Air leak resolved with adjustment of connection  GI: Soft, NT  : Voiding  MSK:  Patient ranging all extremities  No deformities    SKIN: Warm, Dry--Chest tube drainage CTI    Invasive Devices     Peripheral Intravenous Line  Duration           Peripheral IV 11/25/22 Right;Ventral (anterior) Forearm 3 days    Peripheral IV 11/26/22 Left;Dorsal (posterior) Forearm 2 days          Drain Duration           Chest Tube Right 2 days                 [facility] Score  PIC Pain Score: 3 (11/28/2022 11:30 AM)  PIC Incentive Spirometry Score: 3 (11/28/2022 11:00 AM)  PIC Cough Description: 3 (11/28/2022 11:00 AM)  PIC Total Score: 9 (11/28/2022 11:00 AM)       If the Total PIC Score </=5, did you consult APS and evaluate patient for further intervention?: yes      Pain:    Incentive Spirometry  Cough  3 = Controlled  4 = Above goal volume 3 = Strong  2 = Moderate  3 = Goal to alert volume 2 = Weak  1 = Severe  2 = Below alert volume 1 = Absent     1 = Unable to perform IS         Lab Results: Results: I have personally reviewed all pertinent laboratory/tests results, BMP/CMP: No results found for: SODIUM, K, CL, CO2, ANIONGAP, BUN, CREATININE, GLUCOSE, CALCIUM, AST, ALT, ALKPHOS, PROT, BILITOT, EGFR and CBC: No results found for: WBC, HGB, HCT, MCV, PLT, ADJUSTEDWBC, MCH, MCHC, RDW, MPV, NRBC  Imaging: I have personally reviewed pertinent reports       Other Studies: none

## 2022-11-29 ENCOUNTER — APPOINTMENT (INPATIENT)
Dept: RADIOLOGY | Facility: HOSPITAL | Age: 87
End: 2022-11-29

## 2022-11-29 LAB
ANION GAP SERPL CALCULATED.3IONS-SCNC: 6 MMOL/L (ref 4–13)
BASOPHILS # BLD AUTO: 0.03 THOUSANDS/ÂΜL (ref 0–0.1)
BASOPHILS NFR BLD AUTO: 0 % (ref 0–1)
BUN SERPL-MCNC: 19 MG/DL (ref 5–25)
CALCIUM SERPL-MCNC: 9.1 MG/DL (ref 8.3–10.1)
CHLORIDE SERPL-SCNC: 107 MMOL/L (ref 96–108)
CO2 SERPL-SCNC: 25 MMOL/L (ref 21–32)
CREAT SERPL-MCNC: 0.94 MG/DL (ref 0.6–1.3)
EOSINOPHIL # BLD AUTO: 0.42 THOUSAND/ÂΜL (ref 0–0.61)
EOSINOPHIL NFR BLD AUTO: 4 % (ref 0–6)
ERYTHROCYTE [DISTWIDTH] IN BLOOD BY AUTOMATED COUNT: 13.6 % (ref 11.6–15.1)
GFR SERPL CREATININE-BSD FRML MDRD: 71 ML/MIN/1.73SQ M
GLUCOSE SERPL-MCNC: 105 MG/DL (ref 65–140)
HCT VFR BLD AUTO: 34.3 % (ref 36.5–49.3)
HGB BLD-MCNC: 11.1 G/DL (ref 12–17)
IMM GRANULOCYTES # BLD AUTO: 0.03 THOUSAND/UL (ref 0–0.2)
IMM GRANULOCYTES NFR BLD AUTO: 0 % (ref 0–2)
LYMPHOCYTES # BLD AUTO: 1.79 THOUSANDS/ÂΜL (ref 0.6–4.47)
LYMPHOCYTES NFR BLD AUTO: 18 % (ref 14–44)
MCH RBC QN AUTO: 31.4 PG (ref 26.8–34.3)
MCHC RBC AUTO-ENTMCNC: 32.4 G/DL (ref 31.4–37.4)
MCV RBC AUTO: 97 FL (ref 82–98)
MONOCYTES # BLD AUTO: 1.57 THOUSAND/ÂΜL (ref 0.17–1.22)
MONOCYTES NFR BLD AUTO: 16 % (ref 4–12)
NEUTROPHILS # BLD AUTO: 5.92 THOUSANDS/ÂΜL (ref 1.85–7.62)
NEUTS SEG NFR BLD AUTO: 62 % (ref 43–75)
NRBC BLD AUTO-RTO: 0 /100 WBCS
PLATELET # BLD AUTO: 222 THOUSANDS/UL (ref 149–390)
PMV BLD AUTO: 10.7 FL (ref 8.9–12.7)
POTASSIUM SERPL-SCNC: 4.3 MMOL/L (ref 3.5–5.3)
RBC # BLD AUTO: 3.53 MILLION/UL (ref 3.88–5.62)
SODIUM SERPL-SCNC: 138 MMOL/L (ref 135–147)
WBC # BLD AUTO: 9.76 THOUSAND/UL (ref 4.31–10.16)

## 2022-11-29 RX ADMIN — ACETAMINOPHEN 650 MG: 325 TABLET ORAL at 00:32

## 2022-11-29 RX ADMIN — PRAVASTATIN SODIUM 40 MG: 40 TABLET ORAL at 09:55

## 2022-11-29 RX ADMIN — ACETAMINOPHEN 650 MG: 325 TABLET ORAL at 12:33

## 2022-11-29 RX ADMIN — GABAPENTIN 100 MG: 100 CAPSULE ORAL at 17:16

## 2022-11-29 RX ADMIN — GABAPENTIN 100 MG: 100 CAPSULE ORAL at 09:57

## 2022-11-29 RX ADMIN — METHOCARBAMOL 250 MG: 500 TABLET ORAL at 09:55

## 2022-11-29 RX ADMIN — GUAIFENESIN 600 MG: 600 TABLET, EXTENDED RELEASE ORAL at 09:55

## 2022-11-29 RX ADMIN — METHOCARBAMOL 250 MG: 500 TABLET ORAL at 17:16

## 2022-11-29 RX ADMIN — GUAIFENESIN 600 MG: 600 TABLET, EXTENDED RELEASE ORAL at 21:13

## 2022-11-29 RX ADMIN — ACETAMINOPHEN 650 MG: 325 TABLET ORAL at 05:11

## 2022-11-29 RX ADMIN — SENNOSIDES AND DOCUSATE SODIUM 1 TABLET: 8.6; 5 TABLET ORAL at 09:55

## 2022-11-29 RX ADMIN — APIXABAN 5 MG: 5 TABLET, FILM COATED ORAL at 09:55

## 2022-11-29 RX ADMIN — SENNOSIDES AND DOCUSATE SODIUM 1 TABLET: 8.6; 5 TABLET ORAL at 17:16

## 2022-11-29 RX ADMIN — Medication 100 MG: at 09:55

## 2022-11-29 RX ADMIN — ACETAMINOPHEN 650 MG: 325 TABLET ORAL at 17:16

## 2022-11-29 RX ADMIN — METOPROLOL SUCCINATE 100 MG: 100 TABLET, EXTENDED RELEASE ORAL at 09:55

## 2022-11-29 RX ADMIN — APIXABAN 5 MG: 5 TABLET, FILM COATED ORAL at 17:16

## 2022-11-29 NOTE — ASSESSMENT & PLAN NOTE
· Secondary to fall on -CT imaging showed right Hemothorax  · 11/25 S/P Thoracostomy tube placement, initially 500 cc drainage  · Placed to water seal on 11/28  Air leak resolved today with troubleshooting external connection--continue to monitor  · Chest tube drained 220mL over the past 24 hours      · CXR 11/29 stable  · Will plan to remove chest tube once output decreases

## 2022-11-29 NOTE — PLAN OF CARE
Problem: PAIN - ADULT  Goal: Verbalizes/displays adequate comfort level or baseline comfort level  Description: Interventions:  - Encourage patient to monitor pain and request assistance  - Assess pain using appropriate pain scale  - Administer analgesics based on type and severity of pain and evaluate response  - Implement non-pharmacological measures as appropriate and evaluate response  - Consider cultural and social influences on pain and pain management  - Notify physician/advanced practitioner if interventions unsuccessful or patient reports new pain  Outcome: Progressing     Problem: INFECTION - ADULT  Goal: Absence or prevention of progression during hospitalization  Description: INTERVENTIONS:  - Assess and monitor for signs and symptoms of infection  - Monitor lab/diagnostic results  - Monitor all insertion sites, i e  indwelling lines, tubes, and drains  - Monitor endotracheal if appropriate and nasal secretions for changes in amount and color  - [place] appropriate cooling/warming therapies per order  - Administer medications as ordered  - Instruct and encourage patient and family to use good hand hygiene technique  - Identify and instruct in appropriate isolation precautions for identified infection/condition  Outcome: Progressing  Goal: Absence of fever/infection during neutropenic period  Description: INTERVENTIONS:  - Monitor WBC    Outcome: Progressing     Problem: DISCHARGE PLANNING  Goal: Discharge to home or other facility with appropriate resources  Description: INTERVENTIONS:  - Identify barriers to discharge w/patient and caregiver  - Arrange for needed discharge resources and transportation as appropriate  - Identify discharge learning needs (meds, wound care, etc )  - Arrange for interpretive services to assist at discharge as needed  - Refer to Case Management Department for coordinating discharge planning if the patient needs post-hospital services based on physician/advanced practitioner order or complex needs related to functional status, cognitive ability, or social support system  Outcome: Progressing     Problem: Knowledge Deficit  Goal: Patient/family/caregiver demonstrates understanding of disease process, treatment plan, medications, and discharge instructions  Description: Complete learning assessment and assess knowledge base    Interventions:  - Provide teaching at level of understanding  - Provide teaching via preferred learning methods  Outcome: Progressing     Problem: CARDIOVASCULAR - ADULT  Goal: Maintains optimal cardiac output and hemodynamic stability  Description: INTERVENTIONS:  - Monitor I/O, vital signs and rhythm  - Monitor for S/S and trends of decreased cardiac output  - Administer and titrate ordered vasoactive medications to optimize hemodynamic stability  - Assess quality of pulses, skin color and temperature  - Assess for signs of decreased coronary artery perfusion  - Instruct patient to report change in severity of symptoms  Outcome: Progressing  Goal: Absence of cardiac dysrhythmias or at baseline rhythm  Description: INTERVENTIONS:  - Continuous cardiac monitoring, vital signs, obtain 12 lead EKG if ordered  - Administer antiarrhythmic and heart rate control medications as ordered  - Monitor electrolytes and administer replacement therapy as ordered  Outcome: Progressing     Problem: RESPIRATORY - ADULT  Goal: Achieves optimal ventilation and oxygenation  Description: INTERVENTIONS:  - Assess for changes in respiratory status  - Assess for changes in mentation and behavior  - Position to facilitate oxygenation and minimize respiratory effort  - Oxygen administered by appropriate delivery if ordered  - Initiate smoking cessation education as indicated  - Encourage broncho-pulmonary hygiene including cough, deep breathe, Incentive Spirometry  - Assess the need for suctioning and aspirate as needed  - Assess and instruct to report SOB or any respiratory difficulty  - Respiratory Therapy support as indicated  Outcome: Progressing     Problem: MUSCULOSKELETAL - ADULT  Goal: Maintain or return mobility to safest level of function  Description: INTERVENTIONS:  - Assess patient's ability to carry out ADLs; assess patient's baseline for ADL function and identify physical deficits which impact ability to perform ADLs (bathing, care of mouth/teeth, toileting, grooming, dressing, etc )  - Assess/evaluate cause of self-care deficits   - Assess range of motion  - Assess patient's mobility  - Assess patient's need for assistive devices and provide as appropriate  - Encourage maximum independence but intervene and supervise when necessary  - Involve family in performance of ADLs  - Assess for home care needs following discharge   - Consider OT consult to assist with ADL evaluation and planning for discharge  - Provide patient education as appropriate  Outcome: Progressing  Goal: Maintain proper alignment of affected body part  Description: INTERVENTIONS:  - Support, maintain and protect limb and body alignment  - Provide patient/ family with appropriate education  Outcome: Progressing     Problem: HEMATOLOGIC - ADULT  Goal: Maintains hematologic stability  Description: INTERVENTIONS  - Assess for signs and symptoms of bleeding or hemorrhage  - Monitor labs  - Administer supportive blood products/factors as ordered and appropriate  Outcome: Progressing

## 2022-11-29 NOTE — PLAN OF CARE
Problem: Potential for Falls  Goal: Patient will remain free of falls  Description: INTERVENTIONS:  - Educate patient/family on patient safety including physical limitations  - Instruct patient to call for assistance with activity   - Consult OT/PT to assist with strengthening/mobility   - Keep Call bell within reach  - Keep bed low and locked with side rails adjusted as appropriate  - Keep care items and personal belongings within reach  - Initiate and maintain comfort rounds  - Make Fall Risk Sign visible to staff  - Offer Toileting every 2 Hours, in advance of need  - Initiate/Maintain bed alarm  - Obtain necessary fall risk management equipment: bedchair alarm  - Apply yellow socks and bracelet for high fall risk patients  - Consider moving patient to room near nurses station  Outcome: Progressing     Problem: MOBILITY - ADULT  Goal: Maintain or return to baseline ADL function  Description: INTERVENTIONS:  -  Assess patient's ability to carry out ADLs; assess patient's baseline for ADL function and identify physical deficits which impact ability to perform ADLs (bathing, care of mouth/teeth, toileting, grooming, dressing, etc )  - Assess/evaluate cause of self-care deficits   - Assess range of motion  - Assess patient's mobility; develop plan if impaired  - Assess patient's need for assistive devices and provide as appropriate  - Encourage maximum independence but intervene and supervise when necessary  - Involve family in performance of ADLs  - Assess for home care needs following discharge   - Consider OT consult to assist with ADL evaluation and planning for discharge  - Provide patient education as appropriate  Outcome: Progressing  Goal: Maintains/Returns to pre admission functional level  Description: INTERVENTIONS:  - Perform BMAT or MOVE assessment daily    - Set and communicate daily mobility goal to care team and patient/family/caregiver     - Collaborate with rehabilitation services on mobility goals if consulted  - Perform Range of Motion 6 times a day  - Reposition patient every 2 hours    - Dangle patient 6 times a day  - Stand patient 6 times a day  - Ambulate patient 6 times a day  - Out of bed to chair 6 times a day   - Out of bed for meals 6 times a day  - Out of bed for toileting  - Record patient progress and toleration of activity level   Outcome: Progressing     Problem: PAIN - ADULT  Goal: Verbalizes/displays adequate comfort level or baseline comfort level  Description: Interventions:  - Encourage patient to monitor pain and request assistance  - Assess pain using appropriate pain scale  - Administer analgesics based on type and severity of pain and evaluate response  - Implement non-pharmacological measures as appropriate and evaluate response  - Consider cultural and social influences on pain and pain management  - Notify physician/advanced practitioner if interventions unsuccessful or patient reports new pain  Outcome: Progressing     Problem: INFECTION - ADULT  Goal: Absence or prevention of progression during hospitalization  Description: INTERVENTIONS:  - Assess and monitor for signs and symptoms of infection  - Monitor lab/diagnostic results  - Monitor all insertion sites, i e  indwelling lines, tubes, and drains  - Monitor endotracheal if appropriate and nasal secretions for changes in amount and color  - Spring appropriate cooling/warming therapies per order  - Administer medications as ordered  - Instruct and encourage patient and family to use good hand hygiene technique  - Identify and instruct in appropriate isolation precautions for identified infection/condition  Outcome: Progressing  Goal: Absence of fever/infection during neutropenic period  Description: INTERVENTIONS:  - Monitor WBC    Outcome: Progressing     Problem: SAFETY ADULT  Goal: Patient will remain free of falls  Description: INTERVENTIONS:  - Educate patient/family on patient safety including physical limitations  - Instruct patient to call for assistance with activity   - Consult OT/PT to assist with strengthening/mobility   - Keep Call bell within reach  - Keep bed low and locked with side rails adjusted as appropriate  - Keep care items and personal belongings within reach  - Initiate and maintain comfort rounds  - Make Fall Risk Sign visible to staff  - Offer Toileting every 2 Hours, in advance of need  - Initiate/Maintain bed alarm  - Obtain necessary fall risk management equipment: bed/chair alarm  - Apply yellow socks and bracelet for high fall risk patients  - Consider moving patient to room near nurses station  Outcome: Progressing  Goal: Maintain or return to baseline ADL function  Description: INTERVENTIONS:  -  Assess patient's ability to carry out ADLs; assess patient's baseline for ADL function and identify physical deficits which impact ability to perform ADLs (bathing, care of mouth/teeth, toileting, grooming, dressing, etc )  - Assess/evaluate cause of self-care deficits   - Assess range of motion  - Assess patient's mobility; develop plan if impaired  - Assess patient's need for assistive devices and provide as appropriate  - Encourage maximum independence but intervene and supervise when necessary  - Involve family in performance of ADLs  - Assess for home care needs following discharge   - Consider OT consult to assist with ADL evaluation and planning for discharge  - Provide patient education as appropriate  Outcome: Progressing  Goal: Maintains/Returns to pre admission functional level  Description: INTERVENTIONS:  - Perform BMAT or MOVE assessment daily    - Set and communicate daily mobility goal to care team and patient/family/caregiver  - Collaborate with rehabilitation services on mobility goals if consulted  - Perform Range of Motion 6 times a day  - Reposition patient every 26 hours    - Dangle patient 6times a day  - Stand patient 6 times a day  - Ambulate patient 6 times a day  - Out of bed to chair 6 times a day   - Out of bed for meals 6 times a day  - Out of bed for toileting  - Record patient progress and toleration of activity level   Outcome: Progressing     Problem: DISCHARGE PLANNING  Goal: Discharge to home or other facility with appropriate resources  Description: INTERVENTIONS:  - Identify barriers to discharge w/patient and caregiver  - Arrange for needed discharge resources and transportation as appropriate  - Identify discharge learning needs (meds, wound care, etc )  - Arrange for interpretive services to assist at discharge as needed  - Refer to Case Management Department for coordinating discharge planning if the patient needs post-hospital services based on physician/advanced practitioner order or complex needs related to functional status, cognitive ability, or social support system  Outcome: Progressing     Problem: Knowledge Deficit  Goal: Patient/family/caregiver demonstrates understanding of disease process, treatment plan, medications, and discharge instructions  Description: Complete learning assessment and assess knowledge base    Interventions:  - Provide teaching at level of understanding  - Provide teaching via preferred learning methods  Outcome: Progressing     Problem: CARDIOVASCULAR - ADULT  Goal: Maintains optimal cardiac output and hemodynamic stability  Description: INTERVENTIONS:  - Monitor I/O, vital signs and rhythm  - Monitor for S/S and trends of decreased cardiac output  - Administer and titrate ordered vasoactive medications to optimize hemodynamic stability  - Assess quality of pulses, skin color and temperature  - Assess for signs of decreased coronary artery perfusion  - Instruct patient to report change in severity of symptoms  Outcome: Progressing  Goal: Absence of cardiac dysrhythmias or at baseline rhythm  Description: INTERVENTIONS:  - Continuous cardiac monitoring, vital signs, obtain 12 lead EKG if ordered  - Administer antiarrhythmic and heart rate control medications as ordered  - Monitor electrolytes and administer replacement therapy as ordered  Outcome: Progressing     Problem: RESPIRATORY - ADULT  Goal: Achieves optimal ventilation and oxygenation  Description: INTERVENTIONS:  - Assess for changes in respiratory status  - Assess for changes in mentation and behavior  - Position to facilitate oxygenation and minimize respiratory effort  - Oxygen administered by appropriate delivery if ordered  - Initiate smoking cessation education as indicated  - Encourage broncho-pulmonary hygiene including cough, deep breathe, Incentive Spirometry  - Assess the need for suctioning and aspirate as needed  - Assess and instruct to report SOB or any respiratory difficulty  - Respiratory Therapy support as indicated  Outcome: Progressing     Problem: MUSCULOSKELETAL - ADULT  Goal: Maintain or return mobility to safest level of function  Description: INTERVENTIONS:  - Assess patient's ability to carry out ADLs; assess patient's baseline for ADL function and identify physical deficits which impact ability to perform ADLs (bathing, care of mouth/teeth, toileting, grooming, dressing, etc )  - Assess/evaluate cause of self-care deficits   - Assess range of motion  - Assess patient's mobility  - Assess patient's need for assistive devices and provide as appropriate  - Encourage maximum independence but intervene and supervise when necessary  - Involve family in performance of ADLs  - Assess for home care needs following discharge   - Consider OT consult to assist with ADL evaluation and planning for discharge  - Provide patient education as appropriate  Outcome: Progressing  Goal: Maintain proper alignment of affected body part  Description: INTERVENTIONS:  - Support, maintain and protect limb and body alignment  - Provide patient/ family with appropriate education  Outcome: Progressing     Problem: HEMATOLOGIC - ADULT  Goal: Maintains hematologic stability  Description: INTERVENTIONS  - Assess for signs and symptoms of bleeding or hemorrhage  - Monitor labs  - Administer supportive blood products/factors as ordered and appropriate  Outcome: Progressing     Problem: Prexisting or High Potential for Compromised Skin Integrity  Goal: Skin integrity is maintained or improved  Description: INTERVENTIONS:  - Identify patients at risk for skin breakdown  - Assess and monitor skin integrity  - Assess and monitor nutrition and hydration status  - Monitor labs   - Assess for incontinence   - Turn and reposition patient  - Assist with mobility/ambulation  - Relieve pressure over bony prominences  - Avoid friction and shearing  - Provide appropriate hygiene as needed including keeping skin clean and dry  - Evaluate need for skin moisturizer/barrier cream  - Collaborate with interdisciplinary team   - Patient/family teaching  - Consider wound care consult   Outcome: Progressing

## 2022-11-29 NOTE — PROGRESS NOTES
[facility]  Progress Note - Nessa Cap 10/21/1933, 80 y o  male MRN: [id number]  Unit/Bed#: [facility] 803-01 Encounter: [id number]  Primary Care Provider: Gabriella Madrigal DO   Date and time admitted to hospital: 11/25/2022  5:46 PM    Closed fracture of multiple ribs of right side  Assessment & Plan  · Right sided rib fractures 10 mildly displaced, 11 nondisplaced   Rib fracture protocol  Pulmonary toilette/IS  Pulling 1 25-1 5L on IS today  Patient having a large amount of thick sputum over the past 48 hours--patient is able to clear with strong cough, which she has been able to tolerate  Added Mucinex and DuoNeb to assist--encourage nebulizer use with nursing staff  Added flutter  Multi modal analgesia  APS consulted note appreciated  Repeat CXR stable as of 11/28-continue to reassess daily, plan to remove chest tube once output decreases  295 mL over the past 24 hours  · PT/OT       Acute pain due to trauma  Assessment & Plan  · Secondary to rib fractures  · APS consulted note appreciated  · Pain is currently under control with current multimodal pain regimen-patient has not required any IV pain medicine  Hypertension  Assessment & Plan  · Continue home metoprolol      Hyperlipidemia  Assessment & Plan  · Continue home pravastatin    Cardiomyopathy [facility])  Assessment & Plan  · History of CMP with last ECHO on 8/21 showing EF 55-60% grade 1 DD, mildly decreased LV filling internal cavity size, mild AI, Moderate TR, Moderate MR with MVP  · Monitor I&O  · Continue PO metoprolol    Atrial fibrillation (HCC)  Assessment & Plan  · Continue home Eliquis and metoprolol  · Patient switched back to his Toprol XL starting 11/29    * Hemothorax  Assessment & Plan  · Secondary to fall on -CT imaging showed right Hemothorax  · 11/25 S/P Thoracostomy tube placement, initially 500 cc drainage  · Placed to water seal on 11/28    Air leak resolved today with troubleshooting external connection--continue to monitor  · Chest tube drained 220mL over the past 24 hours  · CXR 11/29 stable  · Will plan to remove chest tube once output decreases        Bowel Regimen:  MiraLax  VTE Prophylaxis:Sequential compression device (Venodyne)  Eliquis    Disposition:  Pending chest tube removal     Subjective   Chief Complaint: "I'm doing better today"    Subjective:  Patient describes doing better today  He continues to have some thick secretions with deep breathing and coughing, tan in color  Patient describes being able to mobilize his secretions better today  He denies any shortness of breath or difficulty breathing  He notes that his spirometry numbers have improved to 1 5 L  He notes having a large bowel movement last night  He continues to tolerate his diet and get sleep overnight  Objective   Vitals:   Temp:  [97 5 °F (36 4 °C)-98 2 °F (36 8 °C)] 97 5 °F (36 4 °C)  HR:  [74-85] 74  Resp:  [14-24] 14  BP: (122-137)/(67-76) 137/76    I/O       11/27 0701  11/28 0700 11/28 0701  11/29 0700 11/29 0701  11/30 0700    P  O  400 120     I V  (mL/kg)       Total Intake(mL/kg) 400 (5 3) 120 (1 6)     Urine (mL/kg/hr) 300 (0 2) 0 (0)     Stool  0     Chest Tube 295 220     Total Output 595 220     Net -195 -100            Unmeasured Urine Occurrence 3 x 5 x     Unmeasured Stool Occurrence  1 x            Physical Exam:   GENERAL APPEARANCE: NAD  NEURO: GCS 15  HEENT: NC/AT  Neck supple  CV: RRR, +2 Radial and DP, pulses  LUNGS: CTA overall, Scant Rhonchi  Pulling 1 5 L on IS  Chest tube putting out serosanguineous drainage  No air leak  GI:  Abdomen is soft nontender  :  Pelvis is stable  MSK:  No deformities  SKIN:  Warm, dry      Invasive Devices     Peripheral Intravenous Line  Duration           Peripheral IV 11/26/22 Left;Dorsal (posterior) Forearm 3 days          Drain  Duration           Chest Tube Right 3 days                 [facility]  PIC Pain Score: 3 (11/29/2022  5:11 AM)  PIC Incentive Spirometry Score: 3 (11/28/2022  3:00 PM)  PIC Cough Description: 3 (11/28/2022  3:00 PM)  PIC Total Score: 9 (11/28/2022  3:00 PM)       If the Total PIC Score </=5, did you consult APS and evaluate patient for further intervention?: yes      Pain:    Incentive Spirometry  Cough  3 = Controlled  4 = Above goal volume 3 = Strong  2 = Moderate  3 = Goal to alert volume 2 = Weak  1 = Severe  2 = Below alert volume 1 = Absent     1 = Unable to perform IS         Lab Results:   Results: I have personally reviewed all pertinent laboratory/tests results, BMP/CMP:   Lab Results   Component Value Date    SODIUM 138 11/29/2022    K 4 3 11/29/2022     11/29/2022    CO2 25 11/29/2022    BUN 19 11/29/2022    CREATININE 0 94 11/29/2022    CALCIUM 9 1 11/29/2022    EGFR 71 11/29/2022    and CBC:   Lab Results   Component Value Date    WBC 9 76 11/29/2022    HGB 11 1 (L) 11/29/2022    HCT 34 3 (L) 11/29/2022    MCV 97 11/29/2022     11/29/2022    MCH 31 4 11/29/2022    MCHC 32 4 11/29/2022    RDW 13 6 11/29/2022    MPV 10 7 11/29/2022    NRBC 0 11/29/2022     Imaging: I have personally reviewed pertinent reports       Other Studies: none

## 2022-11-29 NOTE — ASSESSMENT & PLAN NOTE
· Right sided rib fractures 10 mildly displaced, 11 nondisplaced   Rib fracture protocol  Pulmonary toilette/IS  Pulling 1 25-1 5L on IS today  Patient having a large amount of thick sputum over the past 48 hours--patient is able to clear with strong cough, which she has been able to tolerate  Added Mucinex and DuoNeb to assist--encourage nebulizer use with nursing staff  Added flutter  Multi modal analgesia  APS consulted note appreciated  Repeat CXR stable as of 11/28-continue to reassess daily, plan to remove chest tube once output decreases  295 mL over the past 24 hours    · PT/OT

## 2022-11-29 NOTE — CASE MANAGEMENT
Case Management Discharge Planning Note    Patient name Rosie Abbott  [facility] MRN [id number]  : 10/21/1933 Date 2022       Current Admission Date: 2022  Current Admission Diagnosis:Hemothorax   Patient Active Problem List    Diagnosis Date Noted   • Hemothorax 2022   • Acute pain due to trauma 2022   • Closed fracture of multiple ribs of right side 2022   • COVID-19 2022   • Bleeding from varicose vein 2021   • Hematoma 2018   • AV block 10/04/2017   • Other microscopic hematuria 2017   • Bilateral impacted cerumen 2017   • Age-related macular degeneration 10/19/2016   • Bilateral dry eyes 10/19/2016   • Cataract 10/19/2015   • Mitral prolapse 10/19/2015   • Carcinoma in situ of skin 2015   • Disorder of skin 2014   • Cardiomyopathy ([facility] [address] ) 2013   • Hypertension 2013   • Hearing loss 2012   • Aortic regurgitation 2012   • Atrial fibrillation ([address] ) 2012   • Hyperlipidemia 2012   • Benign prostatic hyperplasia 2012      LOS (days): 4  Geometric Mean LOS (GMLOS) (days):   Days to GMLOS:     OBJECTIVE:  Risk of Unplanned Readmission Score: 13 15         Current admission status: Inpatient   Preferred Pharmacy:   [address] [place] [address],[address], [address]  [address] [address]  Phone: [phone] Fax: [phone]    [facility] [address], [address] [facility] [address]  Phone: [phone] Fax: [phone]    Primary Care Provider: Faith Metz DO    Primary Insurance: Lieutenant Fischer [facility]  Secondary Insurance:     DISCHARGE DETAILS:    Pt's chest tube remains  Once removed, pt will need post-pull xray and if stable, then pt can d/c home     No plan for d/c today

## 2022-11-30 ENCOUNTER — APPOINTMENT (INPATIENT)
Dept: RADIOLOGY | Facility: HOSPITAL | Age: 87
End: 2022-11-30

## 2022-11-30 RX ADMIN — ACETAMINOPHEN 650 MG: 325 TABLET ORAL at 02:15

## 2022-11-30 RX ADMIN — ACETAMINOPHEN 650 MG: 325 TABLET ORAL at 17:13

## 2022-11-30 RX ADMIN — PRAVASTATIN SODIUM 40 MG: 40 TABLET ORAL at 09:52

## 2022-11-30 RX ADMIN — GUAIFENESIN 600 MG: 600 TABLET, EXTENDED RELEASE ORAL at 09:52

## 2022-11-30 RX ADMIN — Medication 100 MG: at 09:53

## 2022-11-30 RX ADMIN — SENNOSIDES AND DOCUSATE SODIUM 1 TABLET: 8.6; 5 TABLET ORAL at 09:52

## 2022-11-30 RX ADMIN — APIXABAN 5 MG: 5 TABLET, FILM COATED ORAL at 17:13

## 2022-11-30 RX ADMIN — ACETAMINOPHEN 650 MG: 325 TABLET ORAL at 13:38

## 2022-11-30 RX ADMIN — METHOCARBAMOL 250 MG: 500 TABLET ORAL at 09:54

## 2022-11-30 RX ADMIN — METOPROLOL SUCCINATE 100 MG: 100 TABLET, EXTENDED RELEASE ORAL at 09:53

## 2022-11-30 RX ADMIN — GUAIFENESIN 600 MG: 600 TABLET, EXTENDED RELEASE ORAL at 21:18

## 2022-11-30 RX ADMIN — GABAPENTIN 100 MG: 100 CAPSULE ORAL at 09:53

## 2022-11-30 RX ADMIN — APIXABAN 5 MG: 5 TABLET, FILM COATED ORAL at 09:53

## 2022-11-30 RX ADMIN — GABAPENTIN 100 MG: 100 CAPSULE ORAL at 17:13

## 2022-11-30 RX ADMIN — METHOCARBAMOL 250 MG: 500 TABLET ORAL at 17:13

## 2022-11-30 RX ADMIN — SENNOSIDES AND DOCUSATE SODIUM 1 TABLET: 8.6; 5 TABLET ORAL at 17:13

## 2022-11-30 NOTE — PLAN OF CARE
Problem: Potential for Falls  Goal: Patient will remain free of falls  Description: INTERVENTIONS:  - Educate patient/family on patient safety including physical limitations  - Instruct patient to call for assistance with activity   - Consult OT/PT to assist with strengthening/mobility   - Keep Call bell within reach  - Keep bed low and locked with side rails adjusted as appropriate  - Keep care items and personal belongings within reach  - Initiate and maintain comfort rounds  - Make Fall Risk Sign visible to staff  - Offer Toileting every 2 Hours, in advance of need  - Initiate/Maintain bed/chair alarm  - Apply yellow socks and bracelet for high fall risk patients  - Consider moving patient to room near nurses station  Outcome: Progressing     Problem: MOBILITY - ADULT  Goal: Maintain or return to baseline ADL function  Description: INTERVENTIONS:  - Educate patient/family on patient safety including physical limitations  - Instruct patient to call for assistance with activity   - Consult OT/PT to assist with strengthening/mobility   - Keep Call bell within reach  - Keep bed low and locked with side rails adjusted as appropriate  - Keep care items and personal belongings within reach  - Initiate and maintain comfort rounds  - Make Fall Risk Sign visible to staff  - Offer Toileting every 2 Hours, in advance of need  - Initiate/Maintain bed/chair alarm  - Apply yellow socks and bracelet for high fall risk patients  - Consider moving patient to room near nurses station  Outcome: Progressing  Goal: Maintains/Returns to pre admission functional level  Description: INTERVENTIONS:  - Perform BMAT or MOVE assessment daily    - Set and communicate daily mobility goal to care team and patient/family/caregiver     - Collaborate with rehabilitation services on mobility goals if consulted  Problem: PAIN - ADULT  Goal: Verbalizes/displays adequate comfort level or baseline comfort level  Description: Interventions:  - Encourage patient to monitor pain and request assistance  - Assess pain using appropriate pain scale  - Administer analgesics based on type and severity of pain and evaluate response  - Implement non-pharmacological measures as appropriate and evaluate response  - Consider cultural and social influences on pain and pain management  - Notify physician/advanced practitioner if interventions unsuccessful or patient reports new pain  Outcome: Progressing     Problem: INFECTION - ADULT  Goal: Absence or prevention of progression during hospitalization  Description: INTERVENTIONS:  - Assess and monitor for signs and symptoms of infection  - Monitor lab/diagnostic results  - Monitor all insertion sites, i e  indwelling lines, tubes, and drains  - Monitor endotracheal if appropriate and nasal secretions for changes in amount and color  - [place] appropriate cooling/warming therapies per order  - Administer medications as ordered  - Instruct and encourage patient and family to use good hand hygiene technique  - Identify and instruct in appropriate isolation precautions for identified infection/condition  Outcome: Progressing  Goal: Absence of fever/infection during neutropenic period  Description: INTERVENTIONS:  - Monitor WBC    Outcome: Progressing     Problem: SAFETY ADULT  Goal: Patient will remain free of falls  Description: INTERVENTIONS:  - Educate patient/family on patient safety including physical limitations  - Instruct patient to call for assistance with activity   - Consult OT/PT to assist with strengthening/mobility   - Keep Call bell within reach  - Keep bed low and locked with side rails adjusted as appropriate  - Keep care items and personal belongings within reach  - Initiate and maintain comfort rounds  - Make Fall Risk Sign visible to staff  - Offer Toileting every 2 Hours, in advance of need  - Initiate/Maintain bed/chair alarm  - Apply yellow socks and bracelet for high fall risk patients  - Consider moving patient to room near nurses station  Outcome: Progressing  Goal: Maintain or return to baseline ADL function  Description: INTERVENTIONS:  - Educate patient/family on patient safety including physical limitations  - Instruct patient to call for assistance with activity   - Consult OT/PT to assist with strengthening/mobility   - Keep Call bell within reach  - Keep bed low and locked with side rails adjusted as appropriate  - Keep care items and personal belongings within reach  - Initiate and maintain comfort rounds  - Make Fall Risk Sign visible to staff  - Offer Toileting every 2 Hours, in advance of need  - Initiate/Maintain bed/chair alarm  - Apply yellow socks and bracelet for high fall risk patients  - Consider moving patient to room near nurses station  Outcome: Progressing  Goal: Maintains/Returns to pre admission functional level  Description: INTERVENTIONS:  - Perform BMAT or MOVE assessment daily    - Set and communicate daily mobility goal to care team and patient/family/caregiver     - Collaborate with rehabilitation services on mobility goals if consulted  Problem: DISCHARGE PLANNING  Goal: Discharge to home or other facility with appropriate resources  Description: INTERVENTIONS:  - Identify barriers to discharge w/patient and caregiver  - Arrange for needed discharge resources and transportation as appropriate  - Identify discharge learning needs (meds, wound care, etc )  - Arrange for interpretive services to assist at discharge as needed  - Refer to Case Management Department for coordinating discharge planning if the patient needs post-hospital services based on physician/advanced practitioner order or complex needs related to functional status, cognitive ability, or social support system  Outcome: Progressing     Problem: Knowledge Deficit  Goal: Patient/family/caregiver demonstrates understanding of disease process, treatment plan, medications, and discharge instructions  Description: Complete learning assessment and assess knowledge base    Interventions:  - Provide teaching at level of understanding  - Provide teaching via preferred learning methods  Outcome: Progressing     Problem: CARDIOVASCULAR - ADULT  Goal: Maintains optimal cardiac output and hemodynamic stability  Description: INTERVENTIONS:  - Monitor I/O, vital signs and rhythm  - Monitor for S/S and trends of decreased cardiac output  - Administer and titrate ordered vasoactive medications to optimize hemodynamic stability  - Assess quality of pulses, skin color and temperature  - Assess for signs of decreased coronary artery perfusion  - Instruct patient to report change in severity of symptoms  Outcome: Progressing  Goal: Absence of cardiac dysrhythmias or at baseline rhythm  Description: INTERVENTIONS:  - Continuous cardiac monitoring, vital signs, obtain 12 lead EKG if ordered  - Administer antiarrhythmic and heart rate control medications as ordered  - Monitor electrolytes and administer replacement therapy as ordered  Outcome: Progressing     Problem: RESPIRATORY - ADULT  Goal: Achieves optimal ventilation and oxygenation  Description: INTERVENTIONS:  - Assess for changes in respiratory status  - Assess for changes in mentation and behavior  - Position to facilitate oxygenation and minimize respiratory effort  - Oxygen administered by appropriate delivery if ordered  - Initiate smoking cessation education as indicated  - Encourage broncho-pulmonary hygiene including cough, deep breathe, Incentive Spirometry  - Assess the need for suctioning and aspirate as needed  - Assess and instruct to report SOB or any respiratory difficulty  - Respiratory Therapy support as indicated  Outcome: Progressing     Problem: MUSCULOSKELETAL - ADULT  Goal: Maintain or return mobility to safest level of function  Description: INTERVENTIONS:  - Assess patient's ability to carry out ADLs; assess patient's baseline for ADL function and identify physical deficits which impact ability to perform ADLs (bathing, care of mouth/teeth, toileting, grooming, dressing, etc )  - Assess/evaluate cause of self-care deficits   - Assess range of motion  - Assess patient's mobility  - Assess patient's need for assistive devices and provide as appropriate  - Encourage maximum independence but intervene and supervise when necessary  - Involve family in performance of ADLs  - Assess for home care needs following discharge   - Consider OT consult to assist with ADL evaluation and planning for discharge  - Provide patient education as appropriate  Outcome: Progressing  Goal: Maintain proper alignment of affected body part  Description: INTERVENTIONS:  - Support, maintain and protect limb and body alignment  - Provide patient/ family with appropriate education  Outcome: Progressing     Problem: HEMATOLOGIC - ADULT  Goal: Maintains hematologic stability  Description: INTERVENTIONS  - Assess for signs and symptoms of bleeding or hemorrhage  - Monitor labs  - Administer supportive blood products/factors as ordered and appropriate  Outcome: Progressing     Problem: Prexisting or High Potential for Compromised Skin Integrity  Goal: Skin integrity is maintained or improved  Description: INTERVENTIONS:  - Identify patients at risk for skin breakdown  - Assess and monitor skin integrity  - Assess and monitor nutrition and hydration status  - Monitor labs   - Assess for incontinence   - Turn and reposition patient  - Assist with mobility/ambulation  - Relieve pressure over bony prominences  - Avoid friction and shearing  - Provide appropriate hygiene as needed including keeping skin clean and dry  - Evaluate need for skin moisturizer/barrier cream  - Collaborate with interdisciplinary team   - Patient/family teaching  - Consider wound care consult   Outcome: Progressing     - Out of bed for toileting  - Record patient progress and toleration of activity level   Outcome: Progressing - Out of bed for toileting  - Record patient progress and toleration of activity level   Outcome: Progressing

## 2022-11-30 NOTE — PROGRESS NOTES
Patient sat up from his chair and inner dressing from his chest tube fell on the floor  Dr Keira Murrell is at bedside  Patient is sitting comfortably on room air at 96%  Patient chest tube dressing was changed and the chest tube is currently on water seal  Will continue to monitor  No further interventions at this time

## 2022-11-30 NOTE — PROGRESS NOTES
[address] [facility]  Progress Note - Marshall Delgado 10/21/1933, 80 y o  male MRN: [id number]  Unit/Bed#: [facility] 803-01 Encounter: [id number]  Primary Care Provider: Kaelyn Soares DO   Date and time admitted to hospital: 11/25/2022  5:46 PM    Closed fracture of multiple ribs of right side  Assessment & Plan  · Right sided rib fractures 10 mildly displaced, 11 nondisplaced   Rib fracture protocol  Pulmonary toilette/IS  Pulling 1 250L on IS today  Patient having a large amount of thick sputum over the past 48 hours--patient is able to clear with strong cough, which she has been able to tolerate  Added Mucinex and DuoNeb to assist--encourage nebulizer use with nursing staff  Added flutter  Multi modal analgesia  APS consulted note appreciated  Repeat CXR stable as of 11/30-continue to reassess daily, plan to remove chest tube once output decreases; currently 270mL in the past 24 hours  · PT/OT with rehab and treatment      Acute pain due to trauma  Assessment & Plan  · Secondary to rib fractures  · APS consulted note appreciated  · Pain is currently under control with current multimodal pain regimen-patient has not required any IV pain medicine  Hyperlipidemia  Assessment & Plan  · Continue home pravastatin    Cardiomyopathy [facility])  Assessment & Plan  · History of CMP with last ECHO on 8/21 showing EF 55-60% grade 1 DD, mildly decreased LV filling internal cavity size, mild AI, Moderate TR, Moderate MR with MVP  · Monitor I&O  · Continue PO metoprolol    Atrial fibrillation (HCC)  Assessment & Plan  · Continue home Eliquis and metoprolol  · Patient switched back to his Toprol XL starting 11/29    * Hemothorax  Assessment & Plan  · Secondary to fall on -CT imaging showed right Hemothorax  · 11/25 S/P Thoracostomy tube placement, initially 500 cc drainage  · Placed to water seal on 11/28    Air leak resolved today with troubleshooting external connection--continue to monitor  · Chest tube drained 270mL over the past 24 hours  · 120mL chest tube drainage over the last 12 hours (evening of 11/29-11/30- 12 hours)  · CXR 11/30 stable  · Will plan to remove chest tube once output decreases    Bowel Regimen: miralax  VTE Prophylaxis:Reason for no pharmacologic prophylaxis Eliquis 5 mg     Disposition:  Stable still requires mid/surg treatment    Subjective   Chief Complaint:  Right-sided chest wall pain    Subjective: "A feeling better today, I am coughing a lot "     Objective   Vitals:   Temp:  [97 7 °F (36 5 °C)-98 1 °F (36 7 °C)] 97 7 °F (36 5 °C)  HR:  [78-87] 78  Resp:  [14-20] 14  BP: (122-134)/(73-77) 125/76    I/O       11/28 0701  11/29 0700 11/29 0701  11/30 0700 11/30 0701  12/01 0700    P  O  120 120     Total Intake(mL/kg) 120 (1 6) 120 (1 6)     Urine (mL/kg/hr) 0 (0) 500 (0 3)     Stool 0 0     Chest Tube 220 270     Total Output 220 770     Net -100 -650            Unmeasured Urine Occurrence 5 x 1 x     Unmeasured Stool Occurrence 1 x 1 x          Physical Exam:   General-comfortable  Neuro-no acute neurological deficits; alert and oriented x3  HEENT-EOM intact no pain on EOM, oropharynx clear and patent  Cardiac-regular rate rhythm  Pulmonary-clear to auscultation bilaterally, no adventitious breath sounds  GI-soft, nontender, no distension, normal bowel sounds  -voiding  Musculoskeletal-moves all extremities; neurovascularly intact  Skin-warm and well perfused; chest tube in place      Invasive Devices     Peripheral Intravenous Line  Duration           Peripheral IV 11/30/22 Left;Dorsal (posterior) Forearm <1 day          Drain  Duration           Chest Tube Right 4 days                 PIC Score  PIC Pain Score: 3 (11/30/2022  4:00 AM)  PIC Incentive Spirometry Score: 3 (11/30/2022  4:00 AM)  PIC Cough Description: 3 (11/30/2022  4:00 AM)  PIC Total Score: 9 (11/30/2022  4:00 AM)       If the Total PIC Score </=5, did you consult APS and evaluate patient for further intervention?: yes      Pain:    Incentive Spirometry  Cough  3 = Controlled  4 = Above goal volume 3 = Strong  2 = Moderate  3 = Goal to alert volume 2 = Weak  1 = Severe  2 = Below alert volume 1 = Absent     1 = Unable to perform IS         Lab Results: no labs in a 24 hour period  Imaging: I have personally reviewed pertinent reports       Other Studies: none

## 2022-11-30 NOTE — INCIDENTAL FINDINGS
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
S1, S2 normal, no murmurs, gallops, or rubs. Regular rate and rhythm

## 2022-11-30 NOTE — RESTORATIVE TECHNICIAN NOTE
Restorative Technician Note      Patient Name: Prieto Montes     Restorative Tech Visit Date: 11/30/22  Note Type: Mobility  Patient Position Upon Consult: Standing  Activity Performed: Ambulated  Assistive Device: Other (Comment) (no AD; cues on speed)  Patient Position at End of Consult: All needs within reach;  Supine    Jen Duval  DPT, Restorative Technician

## 2022-11-30 NOTE — ASSESSMENT & PLAN NOTE
· Secondary to fall on -CT imaging showed right Hemothorax  · 11/25 S/P Thoracostomy tube placement, initially 500 cc drainage  · Placed to water seal on 11/28  Air leak resolved today with troubleshooting external connection--continue to monitor  · Chest tube drained 270mL over the past 24 hours      · 120mL chest tube drainage over the last 12 hours (evening of 11/29-11/30- 12 hours)  · CXR 11/30 stable  · Will plan to remove chest tube once output decreases

## 2022-11-30 NOTE — PLAN OF CARE
Problem: Potential for Falls  Goal: Patient will remain free of falls  Description: INTERVENTIONS:  - Educate patient/family on patient safety including physical limitations  - Instruct patient to call for assistance with activity   - Consult OT/PT to assist with strengthening/mobility   - Keep Call bell within reach  - Keep bed low and locked with side rails adjusted as appropriate  - Keep care items and personal belongings within reach  - Initiate and maintain comfort rounds  - Make Fall Risk Sign visible to staff  - Offer Toileting every 2 Hours, in advance of need  - Initiate/Maintain bed/chair alarm  - Apply yellow socks and bracelet for high fall risk patients  - Consider moving patient to room near nurses station  Outcome: Progressing     Problem: MOBILITY - ADULT  Goal: Maintain or return to baseline ADL function  Description: INTERVENTIONS:  - Educate patient/family on patient safety including physical limitations  - Instruct patient to call for assistance with activity   - Consult OT/PT to assist with strengthening/mobility   - Keep Call bell within reach  - Keep bed low and locked with side rails adjusted as appropriate  - Keep care items and personal belongings within reach  - Initiate and maintain comfort rounds  - Make Fall Risk Sign visible to staff  - Offer Toileting every 2 Hours, in advance of need  - Initiate/Maintain bed/chair alarm  - Apply yellow socks and bracelet for high fall risk patients  - Consider moving patient to room near nurses station  Outcome: Progressing  Goal: Maintains/Returns to pre admission functional level  Description: INTERVENTIONS:  - Perform BMAT or MOVE assessment daily    - Set and communicate daily mobility goal to care team and patient/family/caregiver  - Collaborate with rehabilitation services on mobility goals if consulted  - Perform Range of Motion 6 times a day  - Reposition patient every 2 hours    - Dangle patient  6 times a day  - Stand patient 6 times a day  - Ambulate patient 6 times a day  - Out of bed to chair 6 times a day   - Out of bed for meals 6 times a day  - Out of bed for toileting  - Record patient progress and toleration of activity level   Outcome: Progressing     Problem: PAIN - ADULT  Goal: Verbalizes/displays adequate comfort level or baseline comfort level  Description: Interventions:  - Encourage patient to monitor pain and request assistance  - Assess pain using appropriate pain scale  - Administer analgesics based on type and severity of pain and evaluate response  - Implement non-pharmacological measures as appropriate and evaluate response  - Consider cultural and social influences on pain and pain management  - Notify physician/advanced practitioner if interventions unsuccessful or patient reports new pain  Outcome: Progressing     Problem: INFECTION - ADULT  Goal: Absence or prevention of progression during hospitalization  Description: INTERVENTIONS:  - Assess and monitor for signs and symptoms of infection  - Monitor lab/diagnostic results  - Monitor all insertion sites, i e  indwelling lines, tubes, and drains  - Monitor endotracheal if appropriate and nasal secretions for changes in amount and color  - [place] appropriate cooling/warming therapies per order  - Administer medications as ordered  - Instruct and encourage patient and family to use good hand hygiene technique  - Identify and instruct in appropriate isolation precautions for identified infection/condition  Outcome: Progressing  Goal: Absence of fever/infection during neutropenic period  Description: INTERVENTIONS:  - Monitor WBC    Outcome: Progressing     Problem: SAFETY ADULT  Goal: Patient will remain free of falls  Description: INTERVENTIONS:  - Educate patient/family on patient safety including physical limitations  - Instruct patient to call for assistance with activity   - Consult OT/PT to assist with strengthening/mobility   - Keep Call bell within reach  - Keep bed low and locked with side rails adjusted as appropriate  - Keep care items and personal belongings within reach  - Initiate and maintain comfort rounds  - Make Fall Risk Sign visible to staff  - Offer Toileting every 2 Hours, in advance of need  - Initiate/Maintain bed/chair alarm  - Apply yellow socks and bracelet for high fall risk patients  - Consider moving patient to room near nurses station  Outcome: Progressing  Goal: Maintain or return to baseline ADL function  Description: INTERVENTIONS:  - Educate patient/family on patient safety including physical limitations  - Instruct patient to call for assistance with activity   - Consult OT/PT to assist with strengthening/mobility   - Keep Call bell within reach  - Keep bed low and locked with side rails adjusted as appropriate  - Keep care items and personal belongings within reach  - Initiate and maintain comfort rounds  - Make Fall Risk Sign visible to staff  - Offer Toileting every 2 Hours, in advance of need  - Initiate/Maintain bed/chair alarm  - Apply yellow socks and bracelet for high fall risk patients  - Consider moving patient to room near nurses station  Outcome: Progressing  Goal: Maintains/Returns to pre admission functional level  Description: INTERVENTIONS:  - Perform BMAT or MOVE assessment daily    - Set and communicate daily mobility goal to care team and patient/family/caregiver  - Collaborate with rehabilitation services on mobility goals if consulted  - Perform Range of Motion 6 times a day  - Reposition patient every 2 hours    - Dangle patient 6 times a day  - Stand patient 6 times a day  - Ambulate patient 6 times a day  - Out of bed to chair 6 times a day   - Out of bed for meals 6 times a day  - Out of bed for toileting  - Record patient progress and toleration of activity level   Outcome: Progressing     Problem: DISCHARGE PLANNING  Goal: Discharge to home or other facility with appropriate resources  Description: INTERVENTIONS:  - Identify barriers to discharge w/patient and caregiver  - Arrange for needed discharge resources and transportation as appropriate  - Identify discharge learning needs (meds, wound care, etc )  - Arrange for interpretive services to assist at discharge as needed  - Refer to Case Management Department for coordinating discharge planning if the patient needs post-hospital services based on physician/advanced practitioner order or complex needs related to functional status, cognitive ability, or social support system  Outcome: Progressing     Problem: Knowledge Deficit  Goal: Patient/family/caregiver demonstrates understanding of disease process, treatment plan, medications, and discharge instructions  Description: Complete learning assessment and assess knowledge base    Interventions:  - Provide teaching at level of understanding  - Provide teaching via preferred learning methods  Outcome: Progressing     Problem: CARDIOVASCULAR - ADULT  Goal: Maintains optimal cardiac output and hemodynamic stability  Description: INTERVENTIONS:  - Monitor I/O, vital signs and rhythm  - Monitor for S/S and trends of decreased cardiac output  - Administer and titrate ordered vasoactive medications to optimize hemodynamic stability  - Assess quality of pulses, skin color and temperature  - Assess for signs of decreased coronary artery perfusion  - Instruct patient to report change in severity of symptoms  Outcome: Progressing  Goal: Absence of cardiac dysrhythmias or at baseline rhythm  Description: INTERVENTIONS:  - Continuous cardiac monitoring, vital signs, obtain 12 lead EKG if ordered  - Administer antiarrhythmic and heart rate control medications as ordered  - Monitor electrolytes and administer replacement therapy as ordered  Outcome: Progressing     Problem: RESPIRATORY - ADULT  Goal: Achieves optimal ventilation and oxygenation  Description: INTERVENTIONS:  - Assess for changes in respiratory status  - Assess for changes in mentation and behavior  - Position to facilitate oxygenation and minimize respiratory effort  - Oxygen administered by appropriate delivery if ordered  - Initiate smoking cessation education as indicated  - Encourage broncho-pulmonary hygiene including cough, deep breathe, Incentive Spirometry  - Assess the need for suctioning and aspirate as needed  - Assess and instruct to report SOB or any respiratory difficulty  - Respiratory Therapy support as indicated  Outcome: Progressing     Problem: MUSCULOSKELETAL - ADULT  Goal: Maintain or return mobility to safest level of function  Description: INTERVENTIONS:  - Assess patient's ability to carry out ADLs; assess patient's baseline for ADL function and identify physical deficits which impact ability to perform ADLs (bathing, care of mouth/teeth, toileting, grooming, dressing, etc )  - Assess/evaluate cause of self-care deficits   - Assess range of motion  - Assess patient's mobility  - Assess patient's need for assistive devices and provide as appropriate  - Encourage maximum independence but intervene and supervise when necessary  - Involve family in performance of ADLs  - Assess for home care needs following discharge   - Consider OT consult to assist with ADL evaluation and planning for discharge  - Provide patient education as appropriate  Outcome: Progressing  Goal: Maintain proper alignment of affected body part  Description: INTERVENTIONS:  - Support, maintain and protect limb and body alignment  - Provide patient/ family with appropriate education  Outcome: Progressing     Problem: HEMATOLOGIC - ADULT  Goal: Maintains hematologic stability  Description: INTERVENTIONS  - Assess for signs and symptoms of bleeding or hemorrhage  - Monitor labs  - Administer supportive blood products/factors as ordered and appropriate  Outcome: Progressing     Problem: Prexisting or High Potential for Compromised Skin Integrity  Goal: Skin integrity is maintained or improved  Description: INTERVENTIONS:  - Identify patients at risk for skin breakdown  - Assess and monitor skin integrity  - Assess and monitor nutrition and hydration status  - Monitor labs   - Assess for incontinence   - Turn and reposition patient  - Assist with mobility/ambulation  - Relieve pressure over bony prominences  - Avoid friction and shearing  - Provide appropriate hygiene as needed including keeping skin clean and dry  - Evaluate need for skin moisturizer/barrier cream  - Collaborate with interdisciplinary team   - Patient/family teaching  - Consider wound care consult   Outcome: Progressing

## 2022-11-30 NOTE — PROGRESS NOTES
Pastoral Care Progress Note    2022  Patient: Brenda De Jesus : 10/21/1933  Admission Date & Time: 2022 1746  MRN: [id number] [facility]: [id number]      Intro'd self and pastoral care services to Pt, who initially seemed discouraged at his slow healing progress in the hospital    listened as Pt recounted his discouragement, yet we both acknowledged the value/help of a dimitri community that prays for and supports you in such situations  Prayed for him, he asked a few more questions about my Faith background, and he seemed encouraged by the end of our visit                 Chaplaincy Interventions Utilized:   Empowerment: Clarified, confirmed, or reviewed information from treatment team     Exploration: Explored emotional needs & resources, Explored relational needs & resources, and Explored spiritual needs & resources    Collaboration: Advocated for patient/family    Relationship Building: Cultivated a relationship of care and support and Listened empathically    Ritual: Provided prayer    Chaplaincy Outcomes Achieved:  Expressed intermediate hope and Identified meaningful connections    Spiritual Coping Strategies Utilized:   Spiritual comfort       22 1200   Clinical Encounter Type   Visited With Patient   Routine Visit Introduction

## 2022-11-30 NOTE — ASSESSMENT & PLAN NOTE
· Right sided rib fractures 10 mildly displaced, 11 nondisplaced   Rib fracture protocol  Pulmonary toilette/IS  Pulling 1 250L on IS today  Patient having a large amount of thick sputum over the past 48 hours--patient is able to clear with strong cough, which she has been able to tolerate  Added Mucinex and DuoNeb to assist--encourage nebulizer use with nursing staff  Added flutter  Multi modal analgesia  APS consulted note appreciated    Repeat CXR stable as of 11/30-continue to reassess daily, plan to remove chest tube once output decreases; currently 270mL in the past 24 hours  · PT/OT with rehab and treatment

## 2022-12-01 ENCOUNTER — APPOINTMENT (INPATIENT)
Dept: RADIOLOGY | Facility: HOSPITAL | Age: 87
End: 2022-12-01

## 2022-12-01 ENCOUNTER — TRANSITIONAL CARE MANAGEMENT (OUTPATIENT)
Dept: FAMILY MEDICINE CLINIC | Facility: CLINIC | Age: 87
End: 2022-12-01

## 2022-12-01 VITALS
HEART RATE: 90 BPM | BODY MASS INDEX: 26.71 KG/M2 | HEIGHT: 66 IN | WEIGHT: 166.23 LBS | SYSTOLIC BLOOD PRESSURE: 129 MMHG | TEMPERATURE: 98.1 F | DIASTOLIC BLOOD PRESSURE: 78 MMHG | OXYGEN SATURATION: 94 % | RESPIRATION RATE: 20 BRPM

## 2022-12-01 LAB
ALBUMIN SERPL BCP-MCNC: 2.6 G/DL (ref 3.5–5)
ALP SERPL-CCNC: 78 U/L (ref 46–116)
ALT SERPL W P-5'-P-CCNC: 35 U/L (ref 12–78)
ANION GAP SERPL CALCULATED.3IONS-SCNC: 4 MMOL/L (ref 4–13)
AST SERPL W P-5'-P-CCNC: 37 U/L (ref 5–45)
BASOPHILS # BLD AUTO: 0.04 THOUSANDS/ÂΜL (ref 0–0.1)
BASOPHILS NFR BLD AUTO: 0 % (ref 0–1)
BILIRUB SERPL-MCNC: 0.89 MG/DL (ref 0.2–1)
BUN SERPL-MCNC: 19 MG/DL (ref 5–25)
CALCIUM ALBUM COR SERPL-MCNC: 10.5 MG/DL (ref 8.3–10.1)
CALCIUM SERPL-MCNC: 9.4 MG/DL (ref 8.3–10.1)
CHLORIDE SERPL-SCNC: 105 MMOL/L (ref 96–108)
CO2 SERPL-SCNC: 28 MMOL/L (ref 21–32)
CREAT SERPL-MCNC: 0.94 MG/DL (ref 0.6–1.3)
EOSINOPHIL # BLD AUTO: 0.55 THOUSAND/ÂΜL (ref 0–0.61)
EOSINOPHIL NFR BLD AUTO: 6 % (ref 0–6)
ERYTHROCYTE [DISTWIDTH] IN BLOOD BY AUTOMATED COUNT: 13.3 % (ref 11.6–15.1)
GFR SERPL CREATININE-BSD FRML MDRD: 71 ML/MIN/1.73SQ M
GLUCOSE SERPL-MCNC: 110 MG/DL (ref 65–140)
HCT VFR BLD AUTO: 36.1 % (ref 36.5–49.3)
HGB BLD-MCNC: 11.6 G/DL (ref 12–17)
IMM GRANULOCYTES # BLD AUTO: 0.04 THOUSAND/UL (ref 0–0.2)
IMM GRANULOCYTES NFR BLD AUTO: 0 % (ref 0–2)
LYMPHOCYTES # BLD AUTO: 1.42 THOUSANDS/ÂΜL (ref 0.6–4.47)
LYMPHOCYTES NFR BLD AUTO: 16 % (ref 14–44)
MCH RBC QN AUTO: 30.9 PG (ref 26.8–34.3)
MCHC RBC AUTO-ENTMCNC: 32.1 G/DL (ref 31.4–37.4)
MCV RBC AUTO: 96 FL (ref 82–98)
MONOCYTES # BLD AUTO: 1.56 THOUSAND/ÂΜL (ref 0.17–1.22)
MONOCYTES NFR BLD AUTO: 17 % (ref 4–12)
NEUTROPHILS # BLD AUTO: 5.52 THOUSANDS/ÂΜL (ref 1.85–7.62)
NEUTS SEG NFR BLD AUTO: 61 % (ref 43–75)
NRBC BLD AUTO-RTO: 0 /100 WBCS
PLATELET # BLD AUTO: 272 THOUSANDS/UL (ref 149–390)
PMV BLD AUTO: 9.6 FL (ref 8.9–12.7)
POTASSIUM SERPL-SCNC: 3.7 MMOL/L (ref 3.5–5.3)
PROT SERPL-MCNC: 7.1 G/DL (ref 6.4–8.4)
RBC # BLD AUTO: 3.76 MILLION/UL (ref 3.88–5.62)
SODIUM SERPL-SCNC: 137 MMOL/L (ref 135–147)
WBC # BLD AUTO: 9.13 THOUSAND/UL (ref 4.31–10.16)

## 2022-12-01 RX ORDER — POLYETHYLENE GLYCOL 3350 17 G/17G
17 POWDER, FOR SOLUTION ORAL DAILY
Refills: 0
Start: 2022-12-02

## 2022-12-01 RX ORDER — POTASSIUM CHLORIDE 20 MEQ/1
40 TABLET, EXTENDED RELEASE ORAL ONCE
Status: COMPLETED | OUTPATIENT
Start: 2022-12-01 | End: 2022-12-01

## 2022-12-01 RX ORDER — MUSCLE RUB CREAM 100; 150 MG/G; MG/G
CREAM TOPICAL 3 TIMES DAILY
Refills: 0
Start: 2022-12-01

## 2022-12-01 RX ORDER — ACETAMINOPHEN 325 MG/1
650 TABLET ORAL EVERY 6 HOURS SCHEDULED
Refills: 0
Start: 2022-12-01

## 2022-12-01 RX ORDER — GUAIFENESIN 600 MG/1
600 TABLET, EXTENDED RELEASE ORAL EVERY 12 HOURS SCHEDULED
Refills: 0
Start: 2022-12-01

## 2022-12-01 RX ORDER — GABAPENTIN 100 MG/1
100 CAPSULE ORAL 2 TIMES DAILY
Qty: 20 CAPSULE | Refills: 0 | Status: SHIPPED | OUTPATIENT
Start: 2022-12-01 | End: 2022-12-11

## 2022-12-01 RX ORDER — METHOCARBAMOL 500 MG/1
250 TABLET, FILM COATED ORAL 2 TIMES DAILY
Qty: 10 TABLET | Refills: 0 | Status: SHIPPED | OUTPATIENT
Start: 2022-12-01 | End: 2022-12-11

## 2022-12-01 RX ADMIN — PRAVASTATIN SODIUM 40 MG: 40 TABLET ORAL at 08:49

## 2022-12-01 RX ADMIN — SENNOSIDES AND DOCUSATE SODIUM 1 TABLET: 8.6; 5 TABLET ORAL at 08:49

## 2022-12-01 RX ADMIN — Medication 100 MG: at 08:49

## 2022-12-01 RX ADMIN — ACETAMINOPHEN 650 MG: 325 TABLET ORAL at 13:06

## 2022-12-01 RX ADMIN — GABAPENTIN 100 MG: 100 CAPSULE ORAL at 08:49

## 2022-12-01 RX ADMIN — METHOCARBAMOL 250 MG: 500 TABLET ORAL at 08:49

## 2022-12-01 RX ADMIN — POTASSIUM CHLORIDE 40 MEQ: 1500 TABLET, EXTENDED RELEASE ORAL at 13:06

## 2022-12-01 RX ADMIN — MENTHOL, METHYL SALICYLATE: 10; 15 CREAM TOPICAL at 08:54

## 2022-12-01 RX ADMIN — APIXABAN 5 MG: 5 TABLET, FILM COATED ORAL at 08:49

## 2022-12-01 RX ADMIN — ACETAMINOPHEN 650 MG: 325 TABLET ORAL at 05:14

## 2022-12-01 RX ADMIN — METOPROLOL SUCCINATE 100 MG: 100 TABLET, EXTENDED RELEASE ORAL at 08:53

## 2022-12-01 RX ADMIN — GUAIFENESIN 600 MG: 600 TABLET, EXTENDED RELEASE ORAL at 08:49

## 2022-12-01 NOTE — ASSESSMENT & PLAN NOTE
· Right sided rib fractures 10 mildly displaced, 11 nondisplaced   Rib fracture protocol  Pulmonary toilette/IS  Pulling 1000mL on IS today  Patient having a large amount of thick sputum over the past 48 hours--patient is able to clear with strong cough, which he has been able to tolerate  Added Mucinex and DuoNeb to assist--encourage nebulizer use with nursing staff  Added flutter  Multi modal analgesia and bowel regimen   APS consulted note appreciated  · Repeat CXR stable as of 11/30-continue to reassess daily;  Chest tube removed on 12/1  · PT/OT with rehab and treatment

## 2022-12-01 NOTE — PLAN OF CARE
Problem: Potential for Falls  Goal: Patient will remain free of falls  Description: INTERVENTIONS:  - Educate patient/family on patient safety including physical limitations  - Instruct patient to call for assistance with activity   - Consult OT/PT to assist with strengthening/mobility   - Keep Call bell within reach  - Keep bed low and locked with side rails adjusted as appropriate  - Keep care items and personal belongings within reach  - Initiate and maintain comfort rounds  - Make Fall Risk Sign visible to staff  - Offer Toileting every 2 Hours, in advance of need  - Initiate/Maintain bed/chair alarm  - Apply yellow socks and bracelet for high fall risk patients  - Consider moving patient to room near nurses station  Outcome: Progressing     Problem: MOBILITY - ADULT  Goal: Maintain or return to baseline ADL function  Description: INTERVENTIONS:  - Educate patient/family on patient safety including physical limitations  - Instruct patient to call for assistance with activity   - Consult OT/PT to assist with strengthening/mobility   - Keep Call bell within reach  - Keep bed low and locked with side rails adjusted as appropriate  - Keep care items and personal belongings within reach  - Initiate and maintain comfort rounds  - Make Fall Risk Sign visible to staff  - Offer Toileting every 2 Hours, in advance of need  - Initiate/Maintain bed/chair alarm  - Apply yellow socks and bracelet for high fall risk patients  - Consider moving patient to room near nurses station  Outcome: Progressing  Goal: Maintains/Returns to pre admission functional level  Description: INTERVENTIONS:  - Perform BMAT or MOVE assessment daily    - Set and communicate daily mobility goal to care team and patient/family/caregiver  - Collaborate with rehabilitation services on mobility goals if consulted  - Reposition patient every 2 hours    - Stand patient 3 times a day  - Ambulate patient 3 times a day  - Out of bed to chair 3 times a day - Out of bed for meals 3 times a day  - Out of bed for toileting  - Record patient progress and toleration of activity level   Outcome: Progressing     Problem: PAIN - ADULT  Goal: Verbalizes/displays adequate comfort level or baseline comfort level  Description: Interventions:  - Encourage patient to monitor pain and request assistance  - Assess pain using appropriate pain scale  - Administer analgesics based on type and severity of pain and evaluate response  - Implement non-pharmacological measures as appropriate and evaluate response  - Consider cultural and social influences on pain and pain management  - Notify physician/advanced practitioner if interventions unsuccessful or patient reports new pain  Outcome: Progressing

## 2022-12-01 NOTE — QUICK NOTE
Spoke with patient's son and daughter in law bedside regarding patient discharge instructions including incidental findings, medications, outpatient f/u, and prognosis  They expressed appreciation and understanding for his care

## 2022-12-01 NOTE — ASSESSMENT & PLAN NOTE
· Secondary to fall on -CT imaging showed right Hemothorax  · 11/25 S/P Thoracostomy tube placement, initially 500 cc drainage  · Placed to water seal on 11/28  Air leak resolved today with troubleshooting external connection--continue to monitor  · 120mL chest tube drainage over the last 12 hours (evening of 11/29-11/30- 12 hours) and 50 mL from evening of 11/20 to morning of 12/1  · CXR 11/30 stable  · CXR removed and dressing placed on 12/1 at 930 AM, CXR ordered for 4 hours later  · No o2 requirements  · Was told chest tube wasn't tidaling on night shift but CXR ordered was normal and chest tube was in place

## 2022-12-01 NOTE — PLAN OF CARE
Problem: Potential for Falls  Goal: Patient will remain free of falls  Description: INTERVENTIONS:  - Educate patient/family on patient safety including physical limitations  - Instruct patient to call for assistance with activity   - Consult OT/PT to assist with strengthening/mobility   - Keep Call bell within reach  - Keep bed low and locked with side rails adjusted as appropriate  - Keep care items and personal belongings within reach  - Initiate and maintain comfort rounds  - Make Fall Risk Sign visible to staff  - Offer Toileting every 2 Hours, in advance of need  - Initiate/Maintain bed/chair alarm  - Apply yellow socks and bracelet for high fall risk patients  - Consider moving patient to room near nurses station  11/30/2022 2021 by Rodger Rivera RN  Outcome: Progressing  11/30/2022 2019 by Rodger Rivera RN  Outcome: Progressing     Problem: MOBILITY - ADULT  Goal: Maintain or return to baseline ADL function  Description: INTERVENTIONS:  - Educate patient/family on patient safety including physical limitations  - Instruct patient to call for assistance with activity   - Consult OT/PT to assist with strengthening/mobility   - Keep Call bell within reach  - Keep bed low and locked with side rails adjusted as appropriate  - Keep care items and personal belongings within reach  - Initiate and maintain comfort rounds  - Make Fall Risk Sign visible to staff  - Offer Toileting every 2 Hours, in advance of need  - Initiate/Maintain bed/chair alarm  - Apply yellow socks and bracelet for high fall risk patients  - Consider moving patient to room near nurses station  11/30/2022 2021 by Rodger Rivera RN  Outcome: Progressing  11/30/2022 2019 by Rodger Rivera RN  Outcome: Progressing  Goal: Maintains/Returns to pre admission functional level  Description: INTERVENTIONS:  - Perform BMAT or MOVE assessment daily    - Set and communicate daily mobility goal to care team and patient/family/caregiver     - Collaborate with rehabilitation services on mobility goals if consulted  - Reposition patient every 2 hours    - Stand patient 3 times a day  - Ambulate patient 3 times a day  - Out of bed to chair 3 times a day   - Out of bed for meals 3 times a day  - Out of bed for toileting  - Record patient progress and toleration of activity level   11/30/2022 2021 by Boone Navarro RN  Outcome: Progressing  11/30/2022 2019 by Boone Navarro RN  Outcome: Progressing     Problem: PAIN - ADULT  Goal: Verbalizes/displays adequate comfort level or baseline comfort level  Description: Interventions:  - Encourage patient to monitor pain and request assistance  - Assess pain using appropriate pain scale  - Administer analgesics based on type and severity of pain and evaluate response  - Implement non-pharmacological measures as appropriate and evaluate response  - Consider cultural and social influences on pain and pain management  - Notify physician/advanced practitioner if interventions unsuccessful or patient reports new pain  11/30/2022 2021 by Boone Navarro RN  Outcome: Progressing  11/30/2022 2019 by Boone Navarro RN  Outcome: Progressing     Problem: INFECTION - ADULT  Goal: Absence or prevention of progression during hospitalization  Description: INTERVENTIONS:  - Assess and monitor for signs and symptoms of infection  - Monitor lab/diagnostic results  - Monitor all insertion sites, i e  indwelling lines, tubes, and drains  - Monitor endotracheal if appropriate and nasal secretions for changes in amount and color  - [place] appropriate cooling/warming therapies per order  - Administer medications as ordered  - Instruct and encourage patient and family to use good hand hygiene technique  - Identify and instruct in appropriate isolation precautions for identified infection/condition  11/30/2022 2021 by Boone Navarro RN  Outcome: Progressing  11/30/2022 2019 by Boone Navarro RN  Outcome: Progressing  Goal: Absence of fever/infection during neutropenic period  Description: INTERVENTIONS:  - Monitor WBC    11/30/2022 2021 by Wilbert Lieberman RN  Outcome: Progressing  11/30/2022 2019 by Wilbert Lieberman RN  Outcome: Progressing     Problem: SAFETY ADULT  Goal: Patient will remain free of falls  Description: INTERVENTIONS:  - Educate patient/family on patient safety including physical limitations  - Instruct patient to call for assistance with activity   - Consult OT/PT to assist with strengthening/mobility   - Keep Call bell within reach  - Keep bed low and locked with side rails adjusted as appropriate  - Keep care items and personal belongings within reach  - Initiate and maintain comfort rounds  - Make Fall Risk Sign visible to staff  - Offer Toileting every 2 Hours, in advance of need  - Initiate/Maintain bed/chair alarm  - Apply yellow socks and bracelet for high fall risk patients  - Consider moving patient to room near nurses station  11/30/2022 2021 by Wilbert Lieberman RN  Outcome: Progressing  11/30/2022 2019 by Wilbert Lieberman RN  Outcome: Progressing  Goal: Maintain or return to baseline ADL function  Description: INTERVENTIONS:  - Educate patient/family on patient safety including physical limitations  - Instruct patient to call for assistance with activity   - Consult OT/PT to assist with strengthening/mobility   - Keep Call bell within reach  - Keep bed low and locked with side rails adjusted as appropriate  - Keep care items and personal belongings within reach  - Initiate and maintain comfort rounds  - Make Fall Risk Sign visible to staff  - Offer Toileting every 2 Hours, in advance of need  - Initiate/Maintain bed/chair alarm  - Apply yellow socks and bracelet for high fall risk patients  - Consider moving patient to room near nurses station  11/30/2022 2021 by Wilbert Lieberman RN  Outcome: Progressing  11/30/2022 2019 by Wilbert Lieberman RN  Outcome: Progressing  Goal: Maintains/Returns to pre admission functional level  Description: INTERVENTIONS:  - Perform BMAT or MOVE assessment daily    - Set and communicate daily mobility goal to care team and patient/family/caregiver  - Collaborate with rehabilitation services on mobility goals if consulted  - Reposition patient every 2 hours  - Stand patient 3 times a day  - Ambulate patient 3 times a day  - Out of bed to chair 3 times a day   - Out of bed for meals 3 times a day  - Out of bed for toileting  - Record patient progress and toleration of activity level   11/30/2022 2021 by Jaylan Novoa RN  Outcome: Progressing  11/30/2022 2019 by Jaylan Novoa RN  Outcome: Progressing     Problem: DISCHARGE PLANNING  Goal: Discharge to home or other facility with appropriate resources  Description: INTERVENTIONS:  - Identify barriers to discharge w/patient and caregiver  - Arrange for needed discharge resources and transportation as appropriate  - Identify discharge learning needs (meds, wound care, etc )  - Arrange for interpretive services to assist at discharge as needed  - Refer to Case Management Department for coordinating discharge planning if the patient needs post-hospital services based on physician/advanced practitioner order or complex needs related to functional status, cognitive ability, or social support system  11/30/2022 2021 by Jaylan Novoa RN  Outcome: Progressing  11/30/2022 2019 by Jaylan Novoa RN  Outcome: Progressing     Problem: Knowledge Deficit  Goal: Patient/family/caregiver demonstrates understanding of disease process, treatment plan, medications, and discharge instructions  Description: Complete learning assessment and assess knowledge base    Interventions:  - Provide teaching at level of understanding  - Provide teaching via preferred learning methods  11/30/2022 2021 by Jaylan Novoa RN  Outcome: Progressing  11/30/2022 2019 by Jaylan Novoa RN  Outcome: Progressing     Problem: CARDIOVASCULAR - ADULT  Goal: Maintains optimal cardiac output and hemodynamic stability  Description: INTERVENTIONS:  - Monitor I/O, vital signs and rhythm  - Monitor for S/S and trends of decreased cardiac output  - Administer and titrate ordered vasoactive medications to optimize hemodynamic stability  - Assess quality of pulses, skin color and temperature  - Assess for signs of decreased coronary artery perfusion  - Instruct patient to report change in severity of symptoms  11/30/2022 2021 by Mary Ellen Sun RN  Outcome: Progressing  11/30/2022 2019 by Mary Ellen Sun RN  Outcome: Progressing  Goal: Absence of cardiac dysrhythmias or at baseline rhythm  Description: INTERVENTIONS:  - Continuous cardiac monitoring, vital signs, obtain 12 lead EKG if ordered  - Administer antiarrhythmic and heart rate control medications as ordered  - Monitor electrolytes and administer replacement therapy as ordered  11/30/2022 2021 by Mary Ellen Sun RN  Outcome: Progressing  11/30/2022 2019 by Mary Ellen Sun RN  Outcome: Progressing     Problem: RESPIRATORY - ADULT  Goal: Achieves optimal ventilation and oxygenation  Description: INTERVENTIONS:  - Assess for changes in respiratory status  - Assess for changes in mentation and behavior  - Position to facilitate oxygenation and minimize respiratory effort  - Oxygen administered by appropriate delivery if ordered  - Initiate smoking cessation education as indicated  - Encourage broncho-pulmonary hygiene including cough, deep breathe, Incentive Spirometry  - Assess the need for suctioning and aspirate as needed  - Assess and instruct to report SOB or any respiratory difficulty  - Respiratory Therapy support as indicated  11/30/2022 2021 by Mary Ellen Sun RN  Outcome: Progressing  11/30/2022 2019 by Mary Ellen Sun RN  Outcome: Progressing     Problem: MUSCULOSKELETAL - ADULT  Goal: Maintain or return mobility to safest level of function  Description: INTERVENTIONS:  - Assess patient's ability to carry out ADLs; assess patient's baseline for ADL function and identify physical deficits which impact ability to perform ADLs (bathing, care of mouth/teeth, toileting, grooming, dressing, etc )  - Assess/evaluate cause of self-care deficits   - Assess range of motion  - Assess patient's mobility  - Assess patient's need for assistive devices and provide as appropriate  - Encourage maximum independence but intervene and supervise when necessary  - Involve family in performance of ADLs  - Assess for home care needs following discharge   - Consider OT consult to assist with ADL evaluation and planning for discharge  - Provide patient education as appropriate  11/30/2022 2021 by Adonis Ortiz RN  Outcome: Progressing  11/30/2022 2019 by Adonis Ortiz RN  Outcome: Progressing  Goal: Maintain proper alignment of affected body part  Description: INTERVENTIONS:  - Support, maintain and protect limb and body alignment  - Provide patient/ family with appropriate education  11/30/2022 2021 by Adonis Ortiz RN  Outcome: Progressing  11/30/2022 2019 by Adonis Ortiz RN  Outcome: Progressing     Problem: HEMATOLOGIC - ADULT  Goal: Maintains hematologic stability  Description: INTERVENTIONS  - Assess for signs and symptoms of bleeding or hemorrhage  - Monitor labs  - Administer supportive blood products/factors as ordered and appropriate  11/30/2022 2021 by Adonis Ortiz RN  Outcome: Progressing  11/30/2022 2019 by Adonis Ortiz RN  Outcome: Progressing     Problem: Prexisting or High Potential for Compromised Skin Integrity  Goal: Skin integrity is maintained or improved  Description: INTERVENTIONS:  - Identify patients at risk for skin breakdown  - Assess and monitor skin integrity  - Assess and monitor nutrition and hydration status  - Monitor labs   - Assess for incontinence   - Turn and reposition patient  - Assist with mobility/ambulation  - Relieve pressure over bony prominences  - Avoid friction and shearing  - Provide appropriate hygiene as needed including keeping skin clean and dry  - Evaluate need for skin moisturizer/barrier cream  - Collaborate with interdisciplinary team   - Patient/family teaching  - Consider wound care consult   11/30/2022 2021 by Patricia Mcdowell RN  Outcome: Progressing  11/30/2022 2019 by Patricia Mcdowell RN  Outcome: Progressing

## 2022-12-01 NOTE — QUICK NOTE
The right chest tube was pulled  An occlusive dressing using xeroform, 4x4 gauze, and chest tube tape was used for the occlusive dressing  The patient tolerated the procedure well with no immediate complications  The patient is currently breathing RA and saturating >92%  Will obtain a 4-hour PA and lateral post-pull CXR

## 2022-12-01 NOTE — PROGRESS NOTES
[address] [facility]  Progress Note - Rosie Oriental orthodox 10/21/1933, 80 y o  male MRN: [id number]  Unit/Bed#: [facility] 803-01 Encounter: [id number]  Primary Care Provider: Faith Metz DO   Date and time admitted to hospital: 11/25/2022  5:46 PM    Closed fracture of multiple ribs of right side  Assessment & Plan  · Right sided rib fractures 10 mildly displaced, 11 nondisplaced   Rib fracture protocol  Pulmonary toilette/IS  Pulling 1000mL on IS today  Patient having a large amount of thick sputum over the past 48 hours--patient is able to clear with strong cough, which he has been able to tolerate  Added Mucinex and DuoNeb to assist--encourage nebulizer use with nursing staff  Added flutter  Multi modal analgesia and bowel regimen   APS consulted note appreciated  · Repeat CXR stable as of 11/30-continue to reassess daily; Chest tube removed on 12/1  · PT/OT with rehab and treatment      Acute pain due to trauma  Assessment & Plan  · Secondary to rib fractures  · APS consulted note appreciated  · Pain is currently under control with current multimodal pain regimen-patient has not required any IV pain medicine  Hypertension  Assessment & Plan  · Continue home metoprolol      Hyperlipidemia  Assessment & Plan  · Continue home pravastatin    Cardiomyopathy [facility])  Assessment & Plan  · History of CMP with last ECHO on 8/21 showing EF 55-60% grade 1 DD, mildly decreased LV filling internal cavity size, mild AI, Moderate TR, Moderate MR with MVP  · Monitor I&O  · Continue PO metoprolol    Atrial fibrillation (HCC)  Assessment & Plan  · Continue home Eliquis and metoprolol  · Patient switched back to his Toprol XL starting 11/29    * Hemothorax  Assessment & Plan  · Secondary to fall on -CT imaging showed right Hemothorax  · 11/25 S/P Thoracostomy tube placement, initially 500 cc drainage  · Placed to water seal on 11/28    Air leak resolved today with troubleshooting external connection--continue to monitor  · 120mL chest tube drainage over the last 12 hours (evening of 11/29-11/30- 12 hours) and 50 mL from evening of 11/20 to morning of 12/1  · CXR 11/30 stable  · CXR removed and dressing placed on 12/1 at 930 AM, CXR ordered for 4 hours later  · No o2 requirements  · Was told chest tube wasn't tidaling on night shift but CXR ordered was normal and chest tube was in place  Bowel Regimen: Senna  VTE Prophylaxis: Eliquis     Disposition: Patient should be stable to go home today pending CXR s/p chest tube removal     Subjective   Chief Complaint: " I am okay"    Subjective:     Patient states he is doing well this morning and has minimal pain  Patient states the nebulizers are helping his sputum production  Patient is eager to remove the chest tube and states he has been using the IS frequently  Patient has been having bowel movements and urinating and has no complaints  Patient understands and agrees with the plan  All systems negative on ROS including chest pain, SOB, calf pain  Objective   Vitals:   Temp:  [97 3 °F (36 3 °C)-97 9 °F (36 6 °C)] 97 5 °F (36 4 °C)  HR:  [65-80] 65  Resp:  [12-18] 18  BP: (126-141)/(64-76) 136/75    I/O       11/29 0701  11/30 0700 11/30 0701  12/01 0700 12/01 0701  12/02 0700    P  O  120 180 0    Total Intake(mL/kg) 120 (1 6) 180 (2 4) 0 (0)    Urine (mL/kg/hr) 500 (0 3) 0 (0) 350 (1 5)    Stool 0      Chest Tube 270 120     Total Output 770 120 350    Net -650 +60 -350           Unmeasured Urine Occurrence 1 x 3 x     Unmeasured Stool Occurrence 1 x           Physical Exam:   GENERAL APPEARANCE: wdwn, nad  OOB in chair reading the newspaper  Alert and oriented x3  NEURO: GCS15  Light sensation in tact  No deficits  At baseline  HEENT: Normocephalic, atraumatic  EOMS in tact  Wearing glasses  External ear normal  Hearing greatly impaired despite hearing aids  Neck supple and good ROM  CV: RRR no m/r/g  2+ radial and DP pulses    LUNGS: CTAB  Good expansion  Chest tube with 50 cc sanguinous drainage  No air leak  GI: Abdomen soft, nontender, nondistended  : Voiding spontaneously  Pelvis stable   MSK: Full ROM and 5/5 strength of b/l UE and LE  SKIN: Warm, dry, in tact  No abrasions or pressure injuries  Invasive Devices     Peripheral Intravenous Line  Duration           Peripheral IV 11/30/22 Left;Dorsal (posterior) Forearm 1 day                 [facility] Score  PIC Pain Score: 3 (12/1/2022  8:49 AM)  PIC Incentive Spirometry Score: 3 (12/1/2022  4:00 AM)  PIC Cough Description: 3 (12/1/2022  4:00 AM)  PIC Total Score: 9 (12/1/2022  4:00 AM)       If the Total PIC Score </=5, did you consult APS and evaluate patient for further intervention?: yes      Pain:    Incentive Spirometry  Cough  3 = Controlled  4 = Above goal volume 3 = Strong  2 = Moderate  3 = Goal to alert volume 2 = Weak  1 = Severe  2 = Below alert volume 1 = Absent     1 = Unable to perform IS         Lab Results: Results: I have personally reviewed all pertinent laboratory/tests results  Imaging: I have personally reviewed pertinent reports       Other Studies: N/A

## 2022-12-01 NOTE — CASE MANAGEMENT
Case Management Progress Note    Patient name Maricarmen Patton  Location [facility] 314-64 MRN [id number]  : 10/21/1933 Date 2022       LOS (days): 6  Geometric Mean LOS (GMLOS) (days): 3 10  Days to GMLOS:-2 6        OBJECTIVE:        Current admission status: Inpatient  Preferred Pharmacy:   [facility], [address]  [address] [facility]  Phone: [phone] Fax: [phone]    [facility] [address], [address]   [address]   [place] [address]  Phone: [phone] Fax: [phone]    Primary Care Provider: Agatha Barrios DO    Primary Insurance: [id number] [address]  Fabiola Rogers  [facility] REP  Secondary Insurance:     PROGRESS NOTE:    Pt's chest tube removed   Pt will have a post-pull Xray and if stable, will d/c home today

## 2022-12-01 NOTE — DISCHARGE SUMMARY
Discharge Summary - Shlomo Becker 80 y o  male MRN: [id number]    [facility] Encounter: [id number]    Admission Date:   Admission Orders (From admission, onward)     Ordered        11/25/22 1947  Inpatient Admission  Once                        Admitting Diagnosis: Rib fracture [S22 39XA]  Fall [W19  XXXA]  Hemothorax on right [J94 2]    HPI:  "80-year-old male presents for evaluation mechanical fall that occurred yesterday  Patient was in his garage and leaning over to reach for an item fell onto his right side  Patient states he hit the right side of his back on a   Denies head trauma  Reports immediate pain at that time which continued throughout the day today  Patient reports taking ibuprofen prior to arrival with mild relief  Started with shortness of breath a few hours ago which prompted him to present to the emergency department  Denies any further complaints or injuries  Patient is on Eliquis daily  " per Roxi Weldon 12/1    Procedures Performed:   Orders Placed This Encounter   Procedures   • Chest Tube Insertion       Summary of Hospital Course:     Patient was transferred to [facility] and admitted to the trauma service for further monitoring and evaluation including chest tube placement  Patient was initially admitted to the ICU for close monitoring of vital signs and decreased nursing ratio  Patient briefly required NC oxygen but was quickly weaned of these requirements  Patient had progressive improvement of his pulmonary functioning and CXR findings  Patient's hemothorax showed resolution demonstartaed by minimal chest tube output on 12/1  Patient's chest tube was pulled and CXR was obtained demonstrating relief of pneumothorax and hemothorax  Patient was considered stable for d/c home on 12/1/22 and will f/u in the trauma clinic       Significant Findings, Care, Treatment and Services Provided:     XR chest portable    Result Date: 11/30/2022  Impression: Probable trace residual right pneumothorax  Subsegmental right lung base atelectasis  Workstation performed: [id number]     XR chest portable    Result Date: 11/29/2022  Impression: Tiny right apical pneumothorax with pleural line below the level of the right 2nd rib posteriorly with right-sided chest tube in place  The study was marked in [facility] for immediate notification  Workstation performed: [id number]     XR chest portable    Result Date: 11/28/2022  Impression: No pneumothorax with no definite effusion  Workstation performed: [id number]     XR chest portable    Result Date: 11/28/2022  Impression: Stable small right hydropneumothorax and mild bibasilar atelectasis  Workstation performed: [id number]     XR chest portable    Result Date: 11/27/2022  Impression: Slight interval retraction of the right-sided chest tube with the sidehole located along the lateral aspect of the hemithorax  Small right pneumothorax is noted and not previously identified  Small right pleural effusion with subjacent compressive atelectasis  The study was marked in [facility] for immediate notification  Workstation performed: [id number]     XR chest 1 view portable    Result Date: 11/26/2022  Impression: Right chest tube tip near the apex with resolution of pneumothorax  Small right effusion with underlying atelectasis  Workstation performed: [id number]     XR Trauma chest portable    Result Date: 11/25/2022  Impression: Small right pleural effusion and right lower lung opacity, which may represent atelectasis, or pneumonia in the appropriate clinical setting  The study was marked in [facility] for immediate notification  Workstation performed: [id number]     XR chest pa & lateral    Result Date: 12/1/2022  Impression: 1  Right chest tube in place with no definite residual pneumothorax seen  2   Right basilar atelectasis with small effusion   Workstation performed: [id number]     TRAUMA - CT head wo contrast    Result Date: 11/25/2022  Impression: No acute intracranial pathology  In particular, no intracranial hemorrhage or calvarial fracture  Mild chronic microvascular ischemic changes  The study was marked in [facility] for immediate notification  Workstation performed: [id number]     TRAUMA - CT spine cervical wo contrast    Result Date: 11/25/2022  Impression: No cervical spine fracture or traumatic malalignment  Mild multilevel degenerative changes  The study was marked in [facility] for immediate notification  Workstation performed: [id number]     TRAUMA - CT chest abdomen pelvis w contrast    Result Date: 11/25/2022  Impression: Acute, mildly displaced fracture of right 10th rib posteriorly and nondisplaced fracture of right 11th rib posteriorly  Moderate right pleural effusion measuring mildly higher than simple fluid in density, likely due to a component of hemorrhage given the aforementioned rib fracture  Resultant atelectasis in the right lower and middle lobes  No evidence of acute trauma in the abdomen or pelvis  Moderate cardiomegaly  Fusiform ectasia of ascending thoracic aorta measuring 4 1 cm in diameter  No aortic dissection  Recommend follow-up low radiation chest CT in 12 months  Multiple additional findings as above  The study was marked in [facility] for immediate notification  Workstation performed: [id number]     XR chest portable ICU    Result Date: 11/26/2022  Impression: Probable small right effusion and right base atelectasis  No pneumothorax  Workstation performed: [id number]     Complications: None     Discharge Diagnosis: Rib fx, closed  Hemothorax, resolved  Medical Problems     Resolved Problems  Date Reviewed: 12/1/2022   None         Condition at Discharge: good         Discharge instructions/Information to patient and family:   See after visit summary for information provided to patient and family  Provisions for Follow-Up Care:  See after visit summary for information related to follow-up care and any pertinent home health orders        PCP: Weston Page, DO    Disposition: Home    Planned Readmission: No      Discharge Statement   I spent 25 minutes discharging the patient  This time was spent on the day of discharge  I had direct contact with the patient on the day of discharge  Additional documentation is required if more than 30 minutes were spent on discharge  Discharge Medications:  See after visit summary for reconciled discharge medications provided to patient and family

## 2022-12-02 ENCOUNTER — TELEPHONE (OUTPATIENT)
Dept: FAMILY MEDICINE CLINIC | Facility: CLINIC | Age: 87
End: 2022-12-02

## 2022-12-02 DIAGNOSIS — R05.9 COUGH, UNSPECIFIED TYPE: Primary | ICD-10-CM

## 2022-12-02 RX ORDER — CEFUROXIME AXETIL 500 MG/1
500 TABLET ORAL EVERY 12 HOURS SCHEDULED
Qty: 14 TABLET | Refills: 0 | Status: SHIPPED | OUTPATIENT
Start: 2022-12-02 | End: 2022-12-09

## 2022-12-02 NOTE — TELEPHONE ENCOUNTER
Patient's daughter Sintia Hull called  Patient still has a cough and is coughing up mucus which is green  Patient was given musinex while in the hospital, but it did not help  Jenelle also states that patient has blurry vision when coughing  This was happening in the hospital as well  Jenelle would like to know if there is something they can give the patient for the cough      Please advise  Thank you

## 2022-12-02 NOTE — TELEPHONE ENCOUNTER
Advised pts daughter ceftin sent over to pharmacy  Advised robitussin or OTC cough medication to try to help with the cough  Dr Saad Collado will evaluate in office on Wednesday  TCM call complete

## 2022-12-02 NOTE — UTILIZATION REVIEW
NOTIFICATION OF ADMISSION DISCHARGE   This is a Notification of Discharge from [facility]  Please be advised that this patient has been discharge from our facility  Below you will find the admission and discharge date and time including the patient’s disposition  UTILIZATION REVIEW CONTACT:  Lamine Grigsby  Utilization   Network Utilization Review Department  Phone: [phone] x carefully listen to the prompts  All voicemails are confidential   Email: [email or web address]     ADMISSION INFORMATION  PRESENTATION DATE: 11/25/2022  5:46 PM  OBERVATION ADMISSION DATE:   INPATIENT ADMISSION DATE: 11/25/22  7:47 PM   DISCHARGE DATE: 12/1/2022  4:51 PM   DISPOSITION:Home/Self Care    IMPORTANT INFORMATION:  Send all requests for admission clinical reviews, approved or denied determinations and any other requests to dedicated fax number below belonging to the campus where the patient is receiving treatment   List of dedicated fax numbers:  [address] DENIALS (Administrative/Medical Necessity) [phone]   [address] (Maternity/NICU/Pediatrics) [phone]   [facility] [phone]   [facility] [phone]   [address] [phone]   [address] [facility] [phone] [address] [facility] [phone]   [address] [address] [phone]   [facility]  [phone]   [address] [phone]   [address] [facility] [address] [facility] [phone]

## 2022-12-05 ENCOUNTER — RA CDI HCC (OUTPATIENT)
Dept: OTHER | Facility: HOSPITAL | Age: 87
End: 2022-12-05

## 2022-12-05 NOTE — PROGRESS NOTES
[facility]  coding opportunities       Chart reviewed, no opportunity found:   [address]        Patients Insurance     Medicare Insurance: [employer]vantage

## 2022-12-07 ENCOUNTER — OFFICE VISIT (OUTPATIENT)
Dept: FAMILY MEDICINE CLINIC | Facility: CLINIC | Age: 87
End: 2022-12-07

## 2022-12-07 VITALS
HEART RATE: 67 BPM | SYSTOLIC BLOOD PRESSURE: 104 MMHG | HEIGHT: 66 IN | BODY MASS INDEX: 27.51 KG/M2 | RESPIRATION RATE: 20 BRPM | WEIGHT: 171.2 LBS | DIASTOLIC BLOOD PRESSURE: 58 MMHG | TEMPERATURE: 97 F | OXYGEN SATURATION: 99 %

## 2022-12-07 DIAGNOSIS — S22.41XS CLOSED FRACTURE OF MULTIPLE RIBS OF RIGHT SIDE, SEQUELA: ICD-10-CM

## 2022-12-07 DIAGNOSIS — G89.11 ACUTE PAIN DUE TO TRAUMA: ICD-10-CM

## 2022-12-07 DIAGNOSIS — J94.2 HEMOTHORAX: Primary | ICD-10-CM

## 2022-12-07 DIAGNOSIS — Z00.00 MEDICARE ANNUAL WELLNESS VISIT, SUBSEQUENT: ICD-10-CM

## 2022-12-07 NOTE — PROGRESS NOTES
Assessment and Plan:       Medicare wellness visit (subsequent)    Medicare wellness visit completed  See other note for acute chronic issues    BMI Counseling: Body mass index is 27 63 kg/m²  The BMI is above normal  Nutrition recommendations include decreasing portion sizes, encouraging healthy choices of fruits and vegetables, decreasing fast food intake, consuming healthier snacks, limiting drinks that contain sugar, moderation in carbohydrate intake, increasing intake of lean protein, reducing intake of saturated and trans fat and reducing intake of cholesterol  Exercise recommendations include exercising 3-5 times per week  Rationale for BMI follow-up plan is due to patient being overweight or obese  Depression Screening and Follow-up Plan: Patient was screened for depression during today's encounter  They screened negative with a PHQ-2 score of 0  Preventive health issues were discussed with patient, and age appropriate screening tests were ordered as noted in patient's After Visit Summary  Personalized health advice and appropriate referrals for health education or preventive services given if needed, as noted in patient's After Visit Summary  History of Present Illness:     Patient presents for a Medicare Wellness Visit    Patient presents today for Medicare wellness visit  See other note for acute and chronic issues     Patient Care Team:  Abdullahi Ayers DO as PCP - General     Review of Systems:     Review of Systems   Constitutional: Negative  HENT: Negative  Eyes: Negative  Respiratory: Negative  Cardiovascular: Negative  Gastrointestinal: Negative  Endocrine: Negative  Genitourinary: Negative  Musculoskeletal: Negative  Skin: Negative  Allergic/Immunologic: Negative  Neurological: Negative  Hematological: Negative  Psychiatric/Behavioral: Negative  All other systems reviewed and are negative         Problem List:     Patient Active Problem List Diagnosis   • Age-related macular degeneration   • Aortic regurgitation   • Atrial fibrillation (HCC)   • AV block   • Benign prostatic hyperplasia   • Bilateral dry eyes   • Bilateral impacted cerumen   • Carcinoma in situ of skin   • Cardiomyopathy (HCC)   • Cataract   • Disorder of skin   • Hearing loss   • Hyperlipidemia   • Hypertension   • Mitral prolapse   • Other microscopic hematuria   • Hematoma   • Bleeding from varicose vein   • COVID-19   • Hemothorax   • Acute pain due to trauma   • Closed fracture of multiple ribs of right side      Past Medical and Surgical History:     Past Medical History:   Diagnosis Date   • Bradycardia    • Cataract     resolved: 10/19/15   • Other microscopic hematuria     resolved: 1/5/18   • Pacemaker      Past Surgical History:   Procedure Laterality Date   • CARDIAC PACEMAKER PLACEMENT      Dual-Chamber   • HERNIA REPAIR     • KNEE ARTHROSCOPY        Family History:     Family History   Problem Relation Age of Onset   • No Known Problems Mother    • No Known Problems Father       Social History:     Social History     Socioeconomic History   • Marital status:      Spouse name: None   • Number of children: 2   • Years of education: None   • Highest education level: None   Occupational History   • None   Tobacco Use   • Smoking status: Never   • Smokeless tobacco: Never   Vaping Use   • Vaping Use: Never used   Substance and Sexual Activity   • Alcohol use: Yes     Comment: social   • Drug use: Never   • Sexual activity: Not Currently   Other Topics Concern   • None   Social History Narrative    Exercising regularly    Working full time     Social Determinants of Health     Financial Resource Strain: Not on file   Food Insecurity: No Food Insecurity   • Worried About Running Out of Food in the Last Year: Never true   • Ran Out of Food in the Last Year: Never true   Transportation Needs: No Transportation Needs   • Lack of Transportation (Medical):  No   • Lack of Transportation (Non-Medical):  No   Physical Activity: Not on file   Stress: Not on file   Social Connections: Not on file   Intimate Partner Violence: Not At Risk   • Fear of Current or Ex-Partner: No   • Emotionally Abused: No   • Physically Abused: No   • Sexually Abused: No   Housing Stability: Low Risk    • Unable to Pay for Housing in the Last Year: No   • Number of Places Lived in the Last Year: 1   • Unstable Housing in the Last Year: No      Medications and Allergies:     Current Outpatient Medications   Medication Sig Dispense Refill   • acetaminophen (TYLENOL) 325 mg tablet Take 2 tablets (650 mg total) by mouth every 6 (six) hours  0   • apixaban (ELIQUIS) 5 mg Take 5 mg by mouth 2 (two) times a day     • Bee Pollen 500 MG CHEW Chew 500 mg     • Calcium Carbonate (CALCIUM 600 PO) Take 600 mg by mouth     • cefuroxime (CEFTIN) 500 mg tablet Take 1 tablet (500 mg total) by mouth every 12 (twelve) hours for 7 days 14 tablet 0   • Coenzyme Q10 (Co Q 10) 100 MG CAPS Take 100 mg by mouth     • doxazosin (CARDURA) 8 MG tablet Take 1 tablet by mouth daily     • gabapentin (NEURONTIN) 100 mg capsule Take 1 capsule (100 mg total) by mouth 2 (two) times a day for 10 days 20 capsule 0   • losartan (COZAAR) 50 mg tablet Take 1 tablet by mouth daily     • Magnesium 250 MG TABS Take 250 mg by mouth     • menthol-methyl salicylate (BENGAY) 01-69 % cream Apply topically 3 (three) times a day  0   • methocarbamol (ROBAXIN) 500 mg tablet Take 0 5 tablets (250 mg total) by mouth 2 (two) times a day for 10 days 10 tablet 0   • metoprolol succinate (TOPROL-XL) 50 mg 24 hr tablet Take 2 tablets by mouth daily     • polyethylene glycol (MIRALAX) 17 g packet Take 17 g by mouth in the morning Do not start before December 2, 2022   0   • selenium 200 mcg Take 200 mcg by mouth daily     • simvastatin (ZOCOR) 20 mg tablet Take by mouth daily     • Zinc 50 MG TABS Take 50 mg by mouth     • guaiFENesin (MUCINEX) 600 mg 12 hr tablet Take 1 tablet (600 mg total) by mouth every 12 (twelve) hours (Patient not taking: Reported on 12/2/2022)  0     No current facility-administered medications for this visit  No Known Allergies   Immunizations:     Immunization History   Administered Date(s) Administered   • COVID-19 MODERNA VACC 0 5 ML IM 01/30/2021, 02/25/2021, 11/23/2021   • INFLUENZA 11/13/2012, 08/17/2015, 11/02/2016, 11/03/2017, 10/18/2018   • Influenza Split High Dose Preservative Free IM 11/02/2016   • Influenza, seasonal, injectable 10/24/2013, 08/17/2015   • Pneumococcal Polysaccharide PPV23 10/18/2003, 05/06/2011   • influenza, trivalent, adjuvanted 08/29/2019      Health Maintenance: There are no preventive care reminders to display for this patient  Topic Date Due   • Hepatitis B Vaccine (1 of 3 - 3-dose series) Never done   • Pneumococcal Vaccine: 65+ Years (2 - PCV) 05/06/2012   • COVID-19 Vaccine (4 - Booster for Versa Said series) 03/23/2022      Medicare Screening Tests and Risk Assessments:     Soham Louis is here for his Subsequent Wellness visit  Last Medicare Wellness visit information reviewed, patient interviewed and updates made to the record today  Health Risk Assessment:   Patient rates overall health as good  Patient feels that their physical health rating is same  Patient is satisfied with their life  Eyesight was rated as same  Hearing was rated as same  Patient feels that their emotional and mental health rating is same  Patients states they are never, rarely angry  Patient states they are never, rarely unusually tired/fatigued  Pain experienced in the last 7 days has been some  Patient's pain rating has been 2/10  Patient states that he has experienced no weight loss or gain in last 6 months  Depression Screening:   PHQ-2 Score: 0      Fall Risk Screening:    In the past year, patient has experienced: no history of falling in past year      Home Safety:  Patient does not have trouble with stairs inside or outside of their home  Patient has working smoke alarms and has working carbon monoxide detector  Home safety hazards include: none  Nutrition:   Current diet is Regular  Medications:   Patient is currently taking over-the-counter supplements  OTC medications include: see medication list  Patient is able to manage medications  Activities of Daily Living (ADLs)/Instrumental Activities of Daily Living (IADLs):   Walk and transfer into and out of bed and chair?: Yes  Dress and groom yourself?: Yes    Bathe or shower yourself?: Yes    Feed yourself? Yes  Do your laundry/housekeeping?: Yes  Manage your money, pay your bills and track your expenses?: Yes  Make your own meals?: Yes    Do your own shopping?: Yes    Previous Hospitalizations:   Any hospitalizations or ED visits within the last 12 months?: Yes    How many hospitalizations have you had in the last year?: 1-2    Advance Care Planning:   Living will: Yes    Durable POA for healthcare:  Yes    Advanced directive: Yes    Advanced directive counseling given: Yes    Five wishes given: No    End of Life Decisions reviewed with patient: Yes    Provider agrees with end of life decisions: Yes      Cognitive Screening:   Provider or family/friend/caregiver concerned regarding cognition?: No    PREVENTIVE SCREENINGS      Cardiovascular Screening:    General: History Lipid Disorder and Patient Declines      Diabetes Screening:     General: Screening Current      Colorectal Cancer Screening:     General: Screening Not Indicated      Prostate Cancer Screening:    General: Screening Not Indicated      Osteoporosis Screening:    General: Screening Not Indicated      Abdominal Aortic Aneurysm (AAA) Screening:        General: Screening Not Indicated      Lung Cancer Screening:     General: Screening Not Indicated      Hepatitis C Screening:    General: Screening Not Indicated    Screening, Brief Intervention, and Referral to Treatment (SBIRT)    Screening    Typical number of drinks in a week: 0    Single Item Drug Screening:  How often have you used an illegal drug (including marijuana) or a prescription medication for non-medical reasons in the past year? never    Single Item Drug Screen Score: 0  Interpretation: Negative screen for possible drug use disorder    Brief Intervention  Alcohol & drug use screenings were reviewed  No concerns regarding substance use disorder identified  Other Counseling Topics:   Car/seat belt/driving safety, skin self-exam, sunscreen and calcium and vitamin D intake and regular weightbearing exercise  No results found  Physical Exam:     /58 (BP Location: Left arm, Patient Position: Sitting, Cuff Size: Large)   Pulse 67   Temp (!) 97 °F (36 1 °C) (Tympanic)   Resp 20   Ht 5' 6" (1 676 m)   Wt 77 7 kg (171 lb 3 2 oz)   SpO2 99%   BMI 27 63 kg/m²     Physical Exam  Constitutional:       Appearance: He is well-developed  HENT:      Head: Normocephalic  Right Ear: External ear normal       Left Ear: External ear normal       Nose: Nose normal    Eyes:      Conjunctiva/sclera: Conjunctivae normal       Pupils: Pupils are equal, round, and reactive to light  Cardiovascular:      Rate and Rhythm: Normal rate and regular rhythm  Heart sounds: Normal heart sounds  Pulmonary:      Effort: Pulmonary effort is normal       Breath sounds: Normal breath sounds  Abdominal:      General: Bowel sounds are normal       Palpations: Abdomen is soft  Musculoskeletal:         General: Normal range of motion  Cervical back: Normal range of motion and neck supple  Skin:     General: Skin is warm and dry  Comments: Right side wound due to healing chest tube   Neurological:      Mental Status: He is alert and oriented to person, place, and time  Psychiatric:         Behavior: Behavior normal          Thought Content:  Thought content normal          Judgment: Judgment normal           Weston Escoto DO

## 2022-12-07 NOTE — PROGRESS NOTES
Assessment & Plan     1  Hemothorax    2  Acute pain due to trauma    3  Closed fracture of multiple ribs of right side, sequela    4  Medicare annual wellness visit, subsequent    Patient overall is doing much better  He will uses pain medicine as needed  He is able to drive again although I recommend he start local first  We will continue to watch the chest tube wound he will  His pain is controlled  He can follow-up in 6 months or sooner if needed    Depression Screening and Follow-up Plan: Patient was screened for depression during today's encounter  They screened negative with a PHQ-2 score of 0  Subjective     Transitional Care Management Review:   Remi Swanson is a 80 y o  male here for TCM follow up  During the TCM phone call patient stated:  TCM Call     Date and time call was made  12/2/2022  5:25 PM    Hospital care reviewed  Records reviewed    Patient was hospitial[facility] at  [facility]    Date of Admission  11/25/22    Date of discharge  12/01/22    Diagnosis  Hemothorax    Disposition  Home    Were the patients medications reviewed and updated  Yes    Current Symptoms  Cough    Cough Severity  Moderate      TCM Call     Post hospital issues  Reduced activity    Should patient be enrolled in anticoag monitoring? No    Scheduled for follow up?   Yes    Did you obtain your prescribed medications  Yes    Do you need help managing your prescriptions or medications  No    Is transportation to your appointment needed  No    I have advised the patient to call PCP with any new or worsening symptoms  Lazara Garrido, [address]    The type of support provided  Physical; Emotional    Do you have social support  Yes, as much as I need    Are you recieving any outpatient services  No    Are you recieving home care services  No    Are you using any community resources  No    Current waiver services  No    Have you fallen in the last 12 months Yes    Interperter language line needed  No    Counseling  Patient; Family    Counseling topics  patient and family education        Patient presents today for transition of care  Patient had fallen and broken some ribs  He was in the hospital for 6 days  His pain has been controlled  He is recovering  He still has a wound from his chest tube  But otherwise is doing well with no other major complaints    Review of Systems   Constitutional: Negative  HENT: Negative  Eyes: Negative  Respiratory: Negative  Cardiovascular: Positive for chest pain  Due to rib fractures   Gastrointestinal: Negative  Endocrine: Negative  Genitourinary: Negative  Musculoskeletal: Negative  Skin: Negative  Allergic/Immunologic: Negative  Neurological: Negative  Hematological: Negative  Psychiatric/Behavioral: Negative  All other systems reviewed and are negative  Objective     /58 (BP Location: Left arm, Patient Position: Sitting, Cuff Size: Large)   Pulse 67   Temp (!) 97 °F (36 1 °C) (Tympanic)   Resp 20   Ht 5' 6" (1 676 m)   Wt 77 7 kg (171 lb 3 2 oz)   SpO2 99%   BMI 27 63 kg/m²      Physical Exam  Constitutional:       Appearance: He is well-developed  HENT:      Head: Normocephalic  Right Ear: External ear normal       Left Ear: External ear normal       Nose: Nose normal    Eyes:      Conjunctiva/sclera: Conjunctivae normal       Pupils: Pupils are equal, round, and reactive to light  Cardiovascular:      Rate and Rhythm: Normal rate and regular rhythm  Heart sounds: Normal heart sounds  Pulmonary:      Effort: Pulmonary effort is normal       Breath sounds: Normal breath sounds  Abdominal:      General: Bowel sounds are normal       Palpations: Abdomen is soft  Musculoskeletal:         General: Normal range of motion  Cervical back: Normal range of motion and neck supple  Skin:     General: Skin is warm and dry     Neurological:      Mental Status: He is alert and oriented to person, place, and time  Psychiatric:         Behavior: Behavior normal          Thought Content:  Thought content normal          Judgment: Judgment normal        Weston Page, DO

## 2022-12-20 ENCOUNTER — OFFICE VISIT (OUTPATIENT)
Dept: SURGERY | Facility: CLINIC | Age: 87
End: 2022-12-20

## 2022-12-20 VITALS
HEART RATE: 75 BPM | HEIGHT: 66 IN | OXYGEN SATURATION: 97 % | TEMPERATURE: 97.6 F | BODY MASS INDEX: 28 KG/M2 | DIASTOLIC BLOOD PRESSURE: 73 MMHG | SYSTOLIC BLOOD PRESSURE: 125 MMHG | WEIGHT: 174.2 LBS | RESPIRATION RATE: 12 BRPM

## 2022-12-20 DIAGNOSIS — S22.41XD CLOSED FRACTURE OF MULTIPLE RIBS OF RIGHT SIDE WITH ROUTINE HEALING, SUBSEQUENT ENCOUNTER: Primary | ICD-10-CM

## 2022-12-20 DIAGNOSIS — G89.11 ACUTE PAIN DUE TO TRAUMA: ICD-10-CM

## 2022-12-20 NOTE — ASSESSMENT & PLAN NOTE
Patient with mechanical fall  Brief ICU stay secondary to decreased nursing ratio  Chest abdomen pelvis with contrast- " Acute, mildly displaced fracture of right 10th rib posteriorly and nondisplaced fracture of right 11th rib posteriorly "  Hemothorax, pneumothorax improved with chest tube treatment  Patient improved with multimodal pain control  Reports frequent use of IS with 1200 average pull  Patient denies pain at this time  Counseled patient on refraining from lifting items heavier than 20 lb  Counseled patient on not flying for 3 months from current date  Delivered patient work note with limited activity at this time  Counseled patient on following up with trauma services should symptoms worsen

## 2022-12-20 NOTE — LETTER
December 20, 2022     Patient: Kwasi Maloney  YOB: 1933  Date of Visit: 12/20/2022      To Whom it May Concern:    Uma Roe is under my professional care  Meme Dewitt was seen in my office on 12/20/2022  Meme Dewitt may return to work with limitations no lifting weight over 20 pounds until 1/2/2023  If you have any questions or concerns, please don't hesitate to call           Sincerely,          VLADISLAV TRAUMA PROVIDER        CC: Raj Dickson, DO

## 2022-12-20 NOTE — PROGRESS NOTES
Office Visit - General Surgery  Jose M Rocha MRN: [id number]  Encounter: [id number]    Assessment and Plan  Problem List Items Addressed This Visit        Musculoskeletal and Integument    Closed fracture of multiple ribs of right side - Primary     Patient with mechanical fall  Brief ICU stay secondary to decreased nursing ratio  Chest abdomen pelvis with contrast- " Acute, mildly displaced fracture of right 10th rib posteriorly and nondisplaced fracture of right 11th rib posteriorly "  Hemothorax, pneumothorax improved with chest tube treatment  Patient improved with multimodal pain control  Reports frequent use of IS with 1200 average pull  Patient denies pain at this time  Counseled patient on refraining from lifting items heavier than 20 lb  Counseled patient on not flying for 3 months from current date  Delivered patient work note with limited activity at this time  Counseled patient on following up with trauma services should symptoms worsen  Other    Acute pain due to trauma     See below for noted injuries            Chief Complaint:  Jose M Rocha is a 80 y o  male who presents for Follow-up (F/u rib fx  Feeling significantly better than in hospital )    Subjective  Patient is a 57-year-old male with a history of Eliquis use and hypertension that presents to the trauma service clinic for follow-up for multiple right-sided rib fractures  Patient reports frequent use of IS with average pull of 1200  Patient reports that he is not taking any pain medication at this time  Patient reports that he has assumed baseline activities at home with no difficulty and requests a work note for partial duty at this time  Patient reports no new symptoms and currently is on room oxygen with SP O2 saturation of 99%        Past Medical History:   Diagnosis Date   • Bradycardia    • Cataract     resolved: 10/19/15   • Other microscopic hematuria     resolved: 1/5/18   • Pacemaker        Past Surgical History: Procedure Laterality Date   • CARDIAC PACEMAKER PLACEMENT      Dual-Chamber   • HERNIA REPAIR     • KNEE ARTHROSCOPY         Family History   Problem Relation Age of Onset   • No Known Problems Mother    • No Known Problems Father        Social History     Tobacco Use   • Smoking status: Never   • Smokeless tobacco: Never   Vaping Use   • Vaping Use: Never used   Substance Use Topics   • Alcohol use: Yes     Comment: social   • Drug use: Never        Medications  Current Outpatient Medications on File Prior to Visit   Medication Sig Dispense Refill   • acetaminophen (TYLENOL) 325 mg tablet Take 2 tablets (650 mg total) by mouth every 6 (six) hours  0   • apixaban (ELIQUIS) 5 mg Take 5 mg by mouth 2 (two) times a day     • Bee Pollen 500 MG CHEW Chew 500 mg     • Calcium Carbonate (CALCIUM 600 PO) Take 600 mg by mouth     • Coenzyme Q10 (Co Q 10) 100 MG CAPS Take 100 mg by mouth     • doxazosin (CARDURA) 8 MG tablet Take 1 tablet by mouth daily     • losartan (COZAAR) 50 mg tablet Take 1 tablet by mouth daily     • Magnesium 250 MG TABS Take 250 mg by mouth     • menthol-methyl salicylate (BENGAY) 21-53 % cream Apply topically 3 (three) times a day  0   • metoprolol succinate (TOPROL-XL) 50 mg 24 hr tablet Take 2 tablets by mouth daily     • polyethylene glycol (MIRALAX) 17 g packet Take 17 g by mouth in the morning Do not start before December 2, 2022   0   • selenium 200 mcg Take 200 mcg by mouth daily     • simvastatin (ZOCOR) 20 mg tablet Take by mouth daily     • Zinc 50 MG TABS Take 50 mg by mouth     • gabapentin (NEURONTIN) 100 mg capsule Take 1 capsule (100 mg total) by mouth 2 (two) times a day for 10 days 20 capsule 0   • guaiFENesin (MUCINEX) 600 mg 12 hr tablet Take 1 tablet (600 mg total) by mouth every 12 (twelve) hours (Patient not taking: Reported on 12/2/2022)  0   • methocarbamol (ROBAXIN) 500 mg tablet Take 0 5 tablets (250 mg total) by mouth 2 (two) times a day for 10 days 10 tablet 0     No current facility-administered medications on file prior to visit  Allergies  No Known Allergies    Review of Systems   Constitutional: Negative for chills and fever  HENT: Negative for ear pain and sore throat  Eyes: Negative for pain and visual disturbance  Respiratory: Negative for cough and shortness of breath  Cardiovascular: Negative for chest pain and palpitations  Gastrointestinal: Negative for abdominal pain and vomiting  Genitourinary: Negative for dysuria and hematuria  Musculoskeletal: Negative for arthralgias and back pain  Skin: Negative for color change and rash  Neurological: Negative for seizures and syncope  All other systems reviewed and are negative  Objective  Vitals:    12/20/22 1226   BP: 125/73   Pulse: 75   Resp: 12   Temp: 97 6 °F (36 4 °C)   SpO2: 97%       Physical Exam  Vitals and nursing note reviewed  Constitutional:       Appearance: Normal appearance  He is normal weight  Comments: /73   Pulse 75   Temp 97 6 °F (36 4 °C) (Temporal)   Resp 12   Ht 5' 6" (1 676 m)   Wt 79 kg (174 lb 3 2 oz)   SpO2 97%   BMI 28 12 kg/m²      HENT:      Head: Normocephalic and atraumatic  Jaw: There is normal jaw occlusion  Right Ear: Hearing, tympanic membrane, ear canal and external ear normal       Left Ear: Hearing, tympanic membrane, ear canal and external ear normal       Nose: Nose normal       Mouth/Throat:      Lips: Pink  Mouth: Mucous membranes are moist       Pharynx: Oropharynx is clear  Eyes:      General: Lids are normal  Vision grossly intact  Extraocular Movements: Extraocular movements intact  Conjunctiva/sclera: Conjunctivae normal       Pupils: Pupils are equal, round, and reactive to light  Neck:      Trachea: Trachea and phonation normal    Cardiovascular:      Rate and Rhythm: Normal rate  Pulses: Normal pulses  Heart sounds: Normal heart sounds     Pulmonary:      Effort: Pulmonary effort is normal       Breath sounds: Normal breath sounds  Chest:      Comments: No chest wall tenderness anterior and/or posterior  Abdominal:      General: Abdomen is flat  Palpations: Abdomen is soft  Musculoskeletal:         General: Normal range of motion  Cervical back: Full passive range of motion without pain, normal range of motion and neck supple  Lymphadenopathy:      Cervical: No cervical adenopathy  Right cervical: No superficial, deep or posterior cervical adenopathy  Left cervical: No superficial, deep or posterior cervical adenopathy  Skin:     General: Skin is warm  Capillary Refill: Capillary refill takes less than 2 seconds  Neurological:      General: No focal deficit present  Mental Status: He is alert and oriented to person, place, and time  GCS: GCS eye subscore is 4  GCS verbal subscore is 5  GCS motor subscore is 6  Cranial Nerves: Cranial nerves 2-12 are intact  Deep Tendon Reflexes: Reflexes are normal and symmetric  Reflex Scores:       Patellar reflexes are 2+ on the right side and 2+ on the left side  Psychiatric:         Attention and Perception: Attention normal          Mood and Affect: Mood normal          Speech: Speech normal          Behavior: Behavior normal  Behavior is cooperative

## 2023-01-16 ENCOUNTER — TELEPHONE (OUTPATIENT)
Dept: FAMILY MEDICINE CLINIC | Facility: CLINIC | Age: 88
End: 2023-01-16

## 2023-04-27 ENCOUNTER — RA CDI HCC (OUTPATIENT)
Dept: OTHER | Facility: HOSPITAL | Age: 88
End: 2023-04-27

## 2023-05-02 ENCOUNTER — OFFICE VISIT (OUTPATIENT)
Dept: FAMILY MEDICINE CLINIC | Facility: CLINIC | Age: 88
End: 2023-05-02

## 2023-05-02 VITALS
HEIGHT: 66 IN | OXYGEN SATURATION: 100 % | RESPIRATION RATE: 18 BRPM | SYSTOLIC BLOOD PRESSURE: 154 MMHG | WEIGHT: 177.4 LBS | BODY MASS INDEX: 28.51 KG/M2 | HEART RATE: 63 BPM | DIASTOLIC BLOOD PRESSURE: 86 MMHG | TEMPERATURE: 98.7 F

## 2023-05-02 DIAGNOSIS — T16.1XXA FOREIGN BODY OF RIGHT EAR, INITIAL ENCOUNTER: Primary | ICD-10-CM

## 2023-05-02 DIAGNOSIS — H61.21 IMPACTED CERUMEN OF RIGHT EAR: ICD-10-CM

## 2023-05-02 NOTE — PROGRESS NOTES
"    Assessment/Plan:       Diagnoses and all orders for this visit:    Foreign body of right ear, initial encounter  -     Foreign body removal    Other orders  -     Ear cerumen removal      Foreign body removed without complication  The rest of his cerumen impactions were removed  Patient could hear better he will follow-up with his audiologist  He can follow-up with me as scheduled for his routine care      Subjective:     Chief Complaint   Patient presents with    Bilateral ear lavage        Patient ID: Vazquez Raines is a 80 y o  male  Patient presents today for hearing loss  Patient saw audiologist and was told he had a lot of wax as well as a potential foreign body in his ears      The following portions of the patient's history were reviewed and updated as appropriate: allergies, current medications, past family history, past medical history, past social history, past surgical history and problem list     Review of Systems   Constitutional: Negative  HENT: Positive for hearing loss  Eyes: Negative  Respiratory: Negative  Cardiovascular: Negative  Gastrointestinal: Negative  Endocrine: Negative  Genitourinary: Negative  Musculoskeletal: Negative  Skin: Negative  Allergic/Immunologic: Negative  Neurological: Negative  Hematological: Negative  Psychiatric/Behavioral: Negative  All other systems reviewed and are negative  Objective:    Vitals:    05/02/23 1507   BP: 154/86   BP Location: Left arm   Patient Position: Sitting   Cuff Size: Standard   Pulse: 63   Resp: 18   Temp: 98 7 °F (37 1 °C)   TempSrc: Tympanic   SpO2: 100%   Weight: 80 5 kg (177 lb 6 4 oz)   Height: 5' 6\" (1 676 m)          Physical Exam  Vitals and nursing note reviewed  Constitutional:       General: He is not in acute distress  Appearance: He is well-developed  HENT:      Head: Normocephalic  Right Ear: External ear normal  There is impacted cerumen        Left Ear: External " ear normal  There is impacted cerumen  Ears:      Comments: Foreign body in right ear appears to be the bud of a hearing aid     Nose: Nose normal    Eyes:      General:         Right eye: No discharge  Left eye: No discharge  Conjunctiva/sclera: Conjunctivae normal       Pupils: Pupils are equal, round, and reactive to light  Cardiovascular:      Rate and Rhythm: Normal rate and regular rhythm  Heart sounds: Normal heart sounds  Pulmonary:      Effort: Pulmonary effort is normal       Breath sounds: Normal breath sounds  Abdominal:      General: Bowel sounds are normal  There is no distension  Palpations: Abdomen is soft  Tenderness: There is no abdominal tenderness  Musculoskeletal:         General: Normal range of motion  Cervical back: Normal range of motion  Skin:     General: Skin is warm and dry  Findings: No rash  Neurological:      Mental Status: He is alert and oriented to person, place, and time  Cranial Nerves: No cranial nerve deficit  Psychiatric:         Behavior: Behavior normal          Thought Content: Thought content normal          Judgment: Judgment normal            Universal Protocol:  Consent: Verbal consent obtained  Consent given by: patient  Patient understanding: patient states understanding of the procedure being performed  Patient consent: the patient's understanding of the procedure matches consent given  Procedure consent: procedure consent matches procedure scheduled  Test results: test results available and properly labeled  Site marked: the operative site was marked  Radiology Images displayed and confirmed   If images not available, report reviewed: imaging studies available    Foreign body removal    Date/Time: 5/2/2023 3:15 PM  Performed by: Shelli Cardenas DO  Authorized by: Shelli Cardenas DO   Body area: ear    Sedation:  Patient sedated: no  Patient restrained: no  Localization method: ENT speculum  Removal mechanism: alligator forceps  Complexity: simple  1 objects recovered  Objects recovered: Hearing aid earbud  Post-procedure assessment: foreign body removed  Patient tolerance: patient tolerated the procedure well with no immediate complications  Ear cerumen removal    Date/Time: 5/2/2023 3:15 PM  Performed by: Kane Mendenhall DO  Authorized by: Kane Mendenhall DO   Universal Protocol:  Consent: Verbal consent obtained  Risks and benefits: risks, benefits and alternatives were discussed  Consent given by: patient  Patient understanding: patient states understanding of the procedure being performed  Patient consent: the patient's understanding of the procedure matches consent given  Procedure consent: procedure consent matches procedure scheduled  Relevant documents: relevant documents present and verified  Test results: test results available and properly labeled  Site marked: the operative site was marked  Radiology Images displayed and confirmed  If images not available, report reviewed: imaging studies available      Patient location:  Clinic  Procedure details:     Location:  L ear and R ear    Procedure type: irrigation with instrumentation      Instrumentation: forceps and loop      Approach:  Natural orifice  Post-procedure details:     Hearing quality:  Normal    Patient tolerance of procedure: Tolerated well, no immediate complications    I have spent a total time of 30  minutes on 05/02/23 in caring for this patient including Instructions for management

## 2024-02-21 PROBLEM — H61.23 BILATERAL IMPACTED CERUMEN: Status: RESOLVED | Noted: 2017-07-20 | Resolved: 2024-02-21

## 2024-11-26 ENCOUNTER — OFFICE VISIT (OUTPATIENT)
Dept: FAMILY MEDICINE CLINIC | Facility: CLINIC | Age: 89
End: 2024-11-26
Payer: COMMERCIAL

## 2024-11-26 VITALS
BODY MASS INDEX: 28.16 KG/M2 | OXYGEN SATURATION: 98 % | HEART RATE: 69 BPM | TEMPERATURE: 97.6 F | DIASTOLIC BLOOD PRESSURE: 80 MMHG | WEIGHT: 175.2 LBS | SYSTOLIC BLOOD PRESSURE: 146 MMHG | RESPIRATION RATE: 17 BRPM | HEIGHT: 66 IN

## 2024-11-26 DIAGNOSIS — I77.810 THORACIC AORTIC ECTASIA (HCC): ICD-10-CM

## 2024-11-26 DIAGNOSIS — H61.23 BILATERAL IMPACTED CERUMEN: Primary | ICD-10-CM

## 2024-11-26 DIAGNOSIS — I48.11 LONGSTANDING PERSISTENT ATRIAL FIBRILLATION (HCC): ICD-10-CM

## 2024-11-26 DIAGNOSIS — D68.8 OTHER SPECIFIED COAGULATION DEFECTS (HCC): ICD-10-CM

## 2024-11-26 DIAGNOSIS — I42.9 CARDIOMYOPATHY, UNSPECIFIED TYPE (HCC): ICD-10-CM

## 2024-11-26 PROCEDURE — 69209 REMOVE IMPACTED EAR WAX UNI: CPT | Performed by: STUDENT IN AN ORGANIZED HEALTH CARE EDUCATION/TRAINING PROGRAM

## 2024-11-26 PROCEDURE — 99213 OFFICE O/P EST LOW 20 MIN: CPT | Performed by: STUDENT IN AN ORGANIZED HEALTH CARE EDUCATION/TRAINING PROGRAM

## 2024-11-26 RX ORDER — LOSARTAN POTASSIUM 100 MG/1
1 TABLET ORAL DAILY
COMMUNITY
Start: 2024-08-22

## 2024-11-26 NOTE — ASSESSMENT & PLAN NOTE
C/w Cardiology follow up. Discussed updated labs which pt declined. Pt noted q3-6 months labs with [facility] Cardiology

## 2024-11-26 NOTE — ASSESSMENT & PLAN NOTE
C/w Cardiology follow up. Discussed updated labs which pt declined. Pt noted q3-6 months labs with [facility] Card[facility]

## 2024-11-26 NOTE — ASSESSMENT & PLAN NOTE
C/w Cardiology follow up. Discussed updated labs which pt declined. Pt noted q3-6 months labs with [facility] Cardiology

## 2024-11-26 NOTE — ASSESSMENT & PLAN NOTE
C/w Cardiology follow up. Discussed updated labs which pt declined. Pt noted q3-6 months labs with [facility] Cardiology

## 2024-11-26 NOTE — PROGRESS NOTES
Name: Christophe De León      : 10/21/1933      MRN: [id number]  Encounter Provider: Chayo Dodge MD  Encounter Date: 2024   Encounter department: [facility]  :  Assessment & Plan  Bilateral impacted cerumen  Successful cerumen impaction removal; hearing improved.        Thoracic aortic ectasia (HCC)  C/w Cardiology follow up. Discussed updated labs which pt declined. Pt noted q3-6 months labs with [facility]        Other specified coagulation defects (HCC)  C/w Cardiology follow up. Discussed updated labs which pt declined. Pt noted q3-6 months labs with [facility]        Longstanding persistent atrial fibrillation (HCC)  C/w Cardiology follow up. Discussed updated labs which pt declined. Pt noted q3-6 months labs with [facility]        Cardiomyopathy, unspecified type ([facility])  C/w Cardiology follow up. Discussed updated labs which pt declined. Pt noted q3-6 months labs with [facility]               History of Present Illness     89 yo M w/ buddy of Afib, HTN, BPH, and HLD presenting for b/l cerumen impaction. Denies any ear pain just notes hearing aids are not working as well. Otherwise feels well       Review of Systems   Constitutional:  Negative for chills and fever.   HENT:  Positive for hearing loss. Negative for ear pain and trouble swallowing.    Eyes:  Negative for visual disturbance.   Respiratory:  Negative for cough and shortness of breath.    Cardiovascular:  Negative for chest pain and palpitations.   Gastrointestinal:  Negative for abdominal pain and blood in stool.   Genitourinary:  Negative for dysuria and hematuria.   Musculoskeletal:  Negative for arthralgias and back pain.   Skin:  Negative for color change and rash.   Neurological:  Negative for syncope and light-headedness.   All other systems reviewed and are negative.    Past Medical History   Past Medical History:   Diagnosis Date    Bradycardia     Cataract     resolved: 10/19/15    Other  microscopic hematuria     resolved: 1/5/18    Pacemaker      Past Surgical History:   Procedure Laterality Date    CARDIAC PACEMAKER PLACEMENT      Dual-Chamber    HERNIA REPAIR      KNEE ARTHROSCOPY       Family History   Problem Relation Age of Onset    No Known Problems Mother     No Known Problems Father       reports that he has never smoked. He has never used smokeless tobacco. He reports current alcohol use. He reports that he does not use drugs.  Current Outpatient Medications on File Prior to Visit   Medication Sig Dispense Refill    acetaminophen (TYLENOL) 325 mg tablet Take 2 tablets (650 mg total) by mouth every 6 (six) hours  0    apixaban (ELIQUIS) 5 mg Take 5 mg by mouth 2 (two) times a day      Bee Pollen 500 MG CHEW Chew 500 mg      Calcium Carbonate (CALCIUM 600 PO) Take 600 mg by mouth      Coenzyme Q10 (Co Q 10) 100 MG CAPS Take 100 mg by mouth      doxazosin (CARDURA) 8 MG tablet Take 1 tablet by mouth daily      losartan (COZAAR) 100 MG tablet Take 1 tablet by mouth in the morning      Magnesium 250 MG TABS Take 250 mg by mouth      metoprolol succinate (TOPROL-XL) 50 mg 24 hr tablet Take 2 tablets by mouth daily      selenium 200 mcg Take 200 mcg by mouth daily      simvastatin (ZOCOR) 20 mg tablet Take by mouth daily      Zinc 50 MG TABS Take 50 mg by mouth      gabapentin (NEURONTIN) 100 mg capsule Take 1 capsule (100 mg total) by mouth 2 (two) times a day for 10 days (Patient not taking: Reported on 5/2/2023) 20 capsule 0    guaiFENesin (MUCINEX) 600 mg 12 hr tablet Take 1 tablet (600 mg total) by mouth every 12 (twelve) hours (Patient not taking: Reported on 12/2/2022)  0    losartan (COZAAR) 50 mg tablet Take 1 tablet by mouth daily (Patient not taking: Reported on 11/26/2024)      menthol-methyl salicylate (BENGAY) 10-15 % cream Apply topically 3 (three) times a day (Patient not taking: Reported on 5/2/2023)  0    methocarbamol (ROBAXIN) 500 mg tablet Take 0.5 tablets (250 mg total) by  mouth 2 (two) times a day for 10 days 10 tablet 0    polyethylene glycol (MIRALAX) 17 g packet Take 17 g by mouth in the morning Do not start before December 2, 2022. (Patient not taking: Reported on 5/2/2023)  0     No current facility-administered medications on file prior to visit.   No Known Allergies   Medical History Reviewed by provider this encounter:  Tobacco  Allergies  Meds  Problems  Med Hx  Surg Hx  Fam Hx     .  Current Outpatient Medications on File Prior to Visit   Medication Sig Dispense Refill    acetaminophen (TYLENOL) 325 mg tablet Take 2 tablets (650 mg total) by mouth every 6 (six) hours  0    apixaban (ELIQUIS) 5 mg Take 5 mg by mouth 2 (two) times a day      Bee Pollen 500 MG CHEW Chew 500 mg      Calcium Carbonate (CALCIUM 600 PO) Take 600 mg by mouth      Coenzyme Q10 (Co Q 10) 100 MG CAPS Take 100 mg by mouth      doxazosin (CARDURA) 8 MG tablet Take 1 tablet by mouth daily      losartan (COZAAR) 100 MG tablet Take 1 tablet by mouth in the morning      Magnesium 250 MG TABS Take 250 mg by mouth      metoprolol succinate (TOPROL-XL) 50 mg 24 hr tablet Take 2 tablets by mouth daily      selenium 200 mcg Take 200 mcg by mouth daily      simvastatin (ZOCOR) 20 mg tablet Take by mouth daily      Zinc 50 MG TABS Take 50 mg by mouth      gabapentin (NEURONTIN) 100 mg capsule Take 1 capsule (100 mg total) by mouth 2 (two) times a day for 10 days (Patient not taking: Reported on 5/2/2023) 20 capsule 0    guaiFENesin (MUCINEX) 600 mg 12 hr tablet Take 1 tablet (600 mg total) by mouth every 12 (twelve) hours (Patient not taking: Reported on 12/2/2022)  0    losartan (COZAAR) 50 mg tablet Take 1 tablet by mouth daily (Patient not taking: Reported on 11/26/2024)      menthol-methyl salicylate (BENGAY) 10-15 % cream Apply topically 3 (three) times a day (Patient not taking: Reported on 5/2/2023)  0    methocarbamol (ROBAXIN) 500 mg tablet Take 0.5 tablets (250 mg total) by mouth 2 (two) times a  "day for 10 days 10 tablet 0    polyethylene glycol (MIRALAX) 17 g packet Take 17 g by mouth in the morning Do not start before December 2, 2022. (Patient not taking: Reported on 5/2/2023)  0     No current facility-administered medications on file prior to visit.      Social History     Tobacco Use    Smoking status: Never    Smokeless tobacco: Never   Vaping Use    Vaping status: Never Used   Substance and Sexual Activity    Alcohol use: Yes     Comment: social    Drug use: Never    Sexual activity: Not Currently        Objective   /80 (BP Location: Left arm, Patient Position: Sitting, Cuff Size: Large)   Pulse 69   Temp 97.6 °F (36.4 °C) (Tympanic)   Resp 17   Ht 5' 6\" (1.676 m)   Wt 79.5 kg (175 lb 3.2 oz)   SpO2 98%   BMI 28.28 kg/m²      Physical Exam  Vitals and nursing note reviewed.   Constitutional:       General: He is not in acute distress.     Appearance: He is well-developed.   HENT:      Head: Normocephalic and atraumatic.      Right Ear: There is impacted cerumen.      Left Ear: There is impacted cerumen.      Nose: Nose normal.      Mouth/Throat:      Mouth: Mucous membranes are moist.      Pharynx: Oropharynx is clear.   Eyes:      Conjunctiva/sclera: Conjunctivae normal.   Cardiovascular:      Rate and Rhythm: Normal rate and regular rhythm.      Pulses: Normal pulses.      Heart sounds: Murmur heard.   Pulmonary:      Effort: Pulmonary effort is normal. No respiratory distress.      Breath sounds: Normal breath sounds. No wheezing or rales.   Abdominal:      General: Bowel sounds are normal.      Palpations: Abdomen is soft.      Tenderness: There is no abdominal tenderness.   Musculoskeletal:         General: No swelling. Normal range of motion.      Cervical back: Normal range of motion and neck supple.   Skin:     General: Skin is warm and dry.      Capillary Refill: Capillary refill takes less than 2 seconds.   Neurological:      General: No focal deficit present.      Mental " "Status: He is alert.   Psychiatric:         Mood and Affect: Mood normal.     Ear cerumen removal    Date/Time: 11/26/2024 2:00 PM    Performed by: Chayo Dodge MD  Authorized by: Chayo Dodge MD  [facility] Protocol:  procedure performed by consultantConsent: Verbal consent obtained. Written consent not obtained.  Risks and benefits: risks, benefits and alternatives were discussed  Consent given by: patient  Time out: Immediately prior to procedure a \"time out\" was called to verify the correct patient, procedure, equipment, support staff and site/side marked as required.  Timeout called at: 11/26/2024 2:31 PM.  Patient understanding: patient states understanding of the procedure being performed  Patient consent: the patient's understanding of the procedure matches consent given  Procedure consent: procedure consent matches procedure scheduled  Relevant documents: relevant documents present and verified  Test results: test results available and properly labeled  Site marked: the operative site was marked  Radiology Images displayed and confirmed. If images not available, report reviewed: imaging studies available  Patient identity confirmed: verbally with patient    Patient location:  Clinic  Procedure details:     Local anesthetic:  None    Location:  Both ears    Procedure type: irrigation only      Approach:  External  Post-procedure details:     Complication:  None    Hearing quality:  Improved    Patient tolerance of procedure:  Tolerated well, no immediate complications      Administrative Statements   I have spent a total time of 20 minutes in caring for this patient on the day of the visit/encounter including Prognosis, Risks and benefits of tx options, Instructions for management, Importance of tx compliance, Impressions, Documenting in the medical record, and Obtaining or reviewing history  .   "

## 2024-12-10 ENCOUNTER — RA CDI HCC (OUTPATIENT)
Dept: OTHER | Facility: HOSPITAL | Age: 89
End: 2024-12-10

## 2024-12-12 ENCOUNTER — OFFICE VISIT (OUTPATIENT)
Dept: FAMILY MEDICINE CLINIC | Facility: CLINIC | Age: 89
End: 2024-12-12
Payer: COMMERCIAL

## 2024-12-12 VITALS
DIASTOLIC BLOOD PRESSURE: 76 MMHG | HEIGHT: 66 IN | WEIGHT: 179.2 LBS | HEART RATE: 71 BPM | BODY MASS INDEX: 28.8 KG/M2 | SYSTOLIC BLOOD PRESSURE: 148 MMHG | OXYGEN SATURATION: 97 % | TEMPERATURE: 97.7 F | RESPIRATION RATE: 18 BRPM

## 2024-12-12 DIAGNOSIS — L98.9 SKIN LESION OF BACK: Primary | ICD-10-CM

## 2024-12-12 DIAGNOSIS — R05.1 ACUTE COUGH: ICD-10-CM

## 2024-12-12 PROCEDURE — G2211 COMPLEX E/M VISIT ADD ON: HCPCS | Performed by: STUDENT IN AN ORGANIZED HEALTH CARE EDUCATION/TRAINING PROGRAM

## 2024-12-12 PROCEDURE — 99213 OFFICE O/P EST LOW 20 MIN: CPT | Performed by: STUDENT IN AN ORGANIZED HEALTH CARE EDUCATION/TRAINING PROGRAM

## 2024-12-12 RX ORDER — DOXYCYCLINE HYCLATE 100 MG
100 TABLET ORAL 2 TIMES DAILY
Qty: 10 TABLET | Refills: 0 | Status: SHIPPED | OUTPATIENT
Start: 2024-12-12 | End: 2024-12-12 | Stop reason: CLARIF

## 2024-12-12 RX ORDER — DOXYCYCLINE 100 MG/1
100 TABLET ORAL 2 TIMES DAILY
Qty: 10 TABLET | Refills: 0 | Status: SHIPPED | OUTPATIENT
Start: 2024-12-12 | End: 2024-12-17

## 2024-12-12 NOTE — PROGRESS NOTES
Name: Christophe De León      : 10/21/1933      MRN: [id number]  Encounter Provider: Chayo Dodge MD  Encounter Date: 2024   Encounter department: [facility]  :  Assessment & Plan  Skin lesion of back  Right posterior inferior ruptured sebaceous cyst- no drainage, no surrounding erythema and no tenderness to suggest superimposed bacterial     Discussed return precautions, discussed wound care. Pt provided wound care supplies during apt and dressing instructions.        Acute cough  7 days of persistent cough w/ productive cough of yellow sputum w/ slight blood tinge    Denies any fevers or chills or SOB. Does not no improvement in sx.     Will cover for CAP- dual therapy due to age and co morbidities. AE of rx discussed and pt v/u    If blood tinged sputum persists after abx completion, pt to RTC for CT chest   Orders:    amoxicillin-clavulanate (AUGMENTIN) 875-125 mg per tablet; Take 1 tablet by mouth every 12 (twelve) hours for 5 days    doxycycline (ADOXA) 100 MG tablet; Take 1 tablet (100 mg total) by mouth 2 (two) times a day for 5 days           History of Present Illness     89 yo M w/ buddy of Afib, BPH, cardiomyopathy, carcinoma in situ of skin presenting with skin concern as well as cough. Notes his posterior right lower back after showering one day to have some drainage. Denies any pain or fevers or chills. Denies any trauma to that area. Denies any other skin involvement. Also notes 7 days of persistent productive cough with slight blood tinge of sputum. Denies any SOB or ROLON, denies any fevers or chills or sinus pain       Review of Systems   Constitutional:  Negative for chills and fever.   HENT:  Negative for ear pain, postnasal drip, rhinorrhea, sinus pressure, sinus pain, sore throat and trouble swallowing.    Eyes:  Negative for pain and visual disturbance.   Respiratory:  Positive for cough. Negative for shortness of breath.    Cardiovascular:  Negative for chest  "pain and palpitations.   Gastrointestinal:  Negative for abdominal pain and diarrhea.   Genitourinary:  Negative for difficulty urinating, dysuria and hematuria.   Musculoskeletal:  Negative for arthralgias and back pain.   Skin:  Positive for wound. Negative for color change and rash.   Neurological:  Negative for syncope and light-headedness.   All other systems reviewed and are negative.      Objective   /76 (BP Location: Left arm, Patient Position: Sitting, Cuff Size: Large)   Pulse 71   Temp 97.7 °F (36.5 °C) (Tympanic)   Resp 18   Ht 5' 6\" (1.676 m)   Wt 81.3 kg (179 lb 3.2 oz)   SpO2 97%   BMI 28.92 kg/m²      Physical Exam  Vitals and nursing note reviewed.   Constitutional:       General: He is not in acute distress.     Appearance: Normal appearance. He is well-developed. He is not ill-appearing, toxic-appearing or diaphoretic.   HENT:      Head: Normocephalic and atraumatic.      Nose: Nose normal.      Mouth/Throat:      Mouth: Mucous membranes are moist.      Pharynx: Oropharynx is clear.   Eyes:      Conjunctiva/sclera: Conjunctivae normal.   Cardiovascular:      Rate and Rhythm: Normal rate and regular rhythm.      Pulses: Normal pulses.      Heart sounds: Normal heart sounds. No murmur heard.     No friction rub. No gallop.   Pulmonary:      Effort: Pulmonary effort is normal. No respiratory distress.      Breath sounds: Normal breath sounds. No wheezing or rales.   Abdominal:      General: Bowel sounds are normal. There is no distension.      Palpations: Abdomen is soft.      Tenderness: There is no abdominal tenderness.   Musculoskeletal:         General: No swelling. Normal range of motion.      Cervical back: Normal range of motion and neck supple. No rigidity.   Lymphadenopathy:      Cervical: No cervical adenopathy.   Skin:     General: Skin is warm and dry.      Capillary Refill: Capillary refill takes less than 2 seconds.             Comments: Right posterior inferior ruptured " sebaceous cyst- no surrounding erythema, no tenderness to touch, no warmth to touch    Neurological:      Mental Status: He is alert.   Psychiatric:         Mood and Affect: Mood normal.       Administrative Statements   I have spent a total time of 20 minutes in caring for this patient on the day of the visit/encounter including Prognosis, Risks and benefits of tx options, Instructions for management, Patient and family education, Importance of tx compliance, Risk factor reductions, Impressions, Counseling / Coordination of care, Documenting in the medical record, and Reviewing / ordering tests, medicine, procedures  .

## 2025-06-03 ENCOUNTER — OFFICE VISIT (OUTPATIENT)
Dept: FAMILY MEDICINE CLINIC | Facility: CLINIC | Age: OVER 89
End: 2025-06-03
Payer: COMMERCIAL

## 2025-06-03 VITALS
SYSTOLIC BLOOD PRESSURE: 138 MMHG | TEMPERATURE: 97.9 F | RESPIRATION RATE: 16 BRPM | OXYGEN SATURATION: 97 % | BODY MASS INDEX: 27.87 KG/M2 | HEIGHT: 66 IN | DIASTOLIC BLOOD PRESSURE: 78 MMHG | WEIGHT: 173.4 LBS | HEART RATE: 61 BPM

## 2025-06-03 DIAGNOSIS — I42.9 CARDIOMYOPATHY, UNSPECIFIED TYPE (HCC): ICD-10-CM

## 2025-06-03 DIAGNOSIS — I48.11 LONGSTANDING PERSISTENT ATRIAL FIBRILLATION (HCC): ICD-10-CM

## 2025-06-03 DIAGNOSIS — Z00.00 MEDICARE ANNUAL WELLNESS VISIT, SUBSEQUENT: Primary | ICD-10-CM

## 2025-06-03 PROCEDURE — G0439 PPPS, SUBSEQ VISIT: HCPCS | Performed by: FAMILY MEDICINE

## 2025-06-03 NOTE — PROGRESS NOTES
Name: Christophe De León      : 10/21/1933      MRN: [id number]  Encounter Provider: Weston Escoto DO  Encounter Date: 6/3/2025   Encounter department: [facility]  :  Assessment & Plan  Medicare annual wellness visit, subsequent  Medicare wellness visit completed  Overall patient is doing well  He will continue his current medications he will follow-up with cardiology  He can follow-up with me in 1 year or sooner if needed       Longstanding persistent atrial fibrillation (HCC)  Patient follows with cardiology       Cardiomyopathy, unspecified type (HCC)  Patient follows with cardiology         Depression Screening and Follow-up Plan: Patient was screened for depression during today's encounter. They screened negative with a PHQ-2 score of 0.        Preventive health issues were discussed with patient, and age appropriate screening tests were ordered as noted in patient's After Visit Summary. Personalized health advice and appropriate referrals for health education or preventive services given if needed, as noted in patient's After Visit Summary.    History of Present Illness     Patient presents today for Medicare wellness visit  He is feeling well with no complaints he is still working full-time at the age of 89       Patient Care Team:  Weston Escoto DO as PCP - General    Review of Systems   Constitutional: Negative.    HENT: Negative.     Eyes: Negative.    Respiratory: Negative.     Cardiovascular: Negative.    Gastrointestinal: Negative.    Endocrine: Negative.    Genitourinary: Negative.    Musculoskeletal: Negative.    Skin: Negative.    Allergic/Immunologic: Negative.    Neurological: Negative.    Hematological: Negative.    Psychiatric/Behavioral: Negative.     All other systems reviewed and are negative.    Medical History Reviewed by provider this encounter:  Tobacco  Allergies  Meds  Problems  Med Hx  Surg Hx  Fam Hx       Annual Wellness Visit Questionnaire   Christophe is here  for his Subsequent Wellness visit. Last Medicare Wellness visit information reviewed, patient interviewed and updates made to the record today.      Health Risk Assessment:   Patient rates overall health as good. Patient feels that their physical health rating is same. Patient is satisfied with their life. Eyesight was rated as same. Hearing was rated as same. Patient feels that their emotional and mental health rating is same. Patients states they are never, rarely angry. Patient states they are never, rarely unusually tired/fatigued. Pain experienced in the last 7 days has been none. Patient states that he has experienced no weight loss or gain in last 6 months.     Depression Screening:   PHQ-2 Score: 0      Fall Risk Screening:   In the past year, patient has experienced: no history of falling in past year      Home Safety:  Patient does not have trouble with stairs inside or outside of their home. Patient has working smoke alarms and has working carbon monoxide detector. Home safety hazards include: none.     Nutrition:   Current diet is Regular.     Medications:   Patient is currently taking over-the-counter supplements. OTC medications include: see medication list. Patient is able to manage medications.     Activities of Daily Living (ADLs)/Instrumental Activities of Daily Living (IADLs):   Walk and transfer into and out of bed and chair?: Yes  Dress and groom yourself?: Yes    Bathe or shower yourself?: Yes    Feed yourself? Yes  Do your laundry/housekeeping?: Yes  Manage your money, pay your bills and track your expenses?: Yes  Make your own meals?: Yes    Do your own shopping?: Yes    Previous Hospitalizations:   Any hospitalizations or ED visits within the last 12 months?: No      Advance Care Planning:   Living will: Yes    Durable POA for healthcare: Yes    Advanced directive: Yes    Advanced directive counseling given: Yes    End of Life Decisions reviewed with patient: Yes    Provider agrees with end  of life decisions: Yes      Cognitive Screening:   Provider or family/friend/caregiver concerned regarding cognition?: No    Preventive Screenings      Cardiovascular Screening:    General: History Lipid Disorder and Screening Current      Diabetes Screening:     General: Screening Current      Colorectal Cancer Screening:     General: Screening Not Indicated      Prostate Cancer Screening:    General: Screening Not Indicated      Osteoporosis Screening:    General: Screening Not Indicated      Abdominal Aortic Aneurysm (AAA) Screening:        General: Screening Not Indicated      Lung Cancer Screening:     General: Screening Not Indicated      Hepatitis C Screening:    General: Screening Not Indicated    Immunizations:  - Immunizations due: Prevnar 20 and Zoster (Shingrix)  - Risks/benefits immunizations discussed      Screening, Brief Intervention, and Referral to Treatment (SBIRT)     Screening    Typical number of drinks in a week: 0    Single Item Drug Screening:  How often have you used an illegal drug (including marijuana) or a prescription medication for non-medical reasons in the past year? never    Single Item Drug Screen Score: 0  Interpretation: Negative screen for possible drug use disorder    Brief Intervention  Alcohol & drug use screenings were reviewed. No concerns regarding substance use disorder identified.     Other Counseling Topics:   Car/seat belt/driving safety, skin self-exam, sunscreen and regular weightbearing exercise and calcium and vitamin D intake.     Social Drivers of Health     Financial Resource Strain: Low Risk  (12/7/2022)    Overall Financial Resource Strain (CARDIA)     Difficulty of Paying Living Expenses: Not hard at all   Food Insecurity: No Food Insecurity (6/3/2025)    Nursing - Inadequate Food Risk Classification     Worried About Running Out of Food in the Last Year: Never true     Ran Out of Food in the Last Year: Never true   Transportation Needs: No Transportation  "Needs (6/3/2025)    PRAPARE - Transportation     Lack of Transportation (Medical): No     Lack of Transportation (Non-Medical): No   Housing Stability: Low Risk  (6/3/2025)    Housing Stability Vital Sign     Unable to Pay for Housing in the Last Year: No     Number of Times Moved in the Last Year: 0     Homeless in the Last Year: No   Utilities: Not At Risk (6/3/2025)    [facility] Utilities     Threatened with loss of utilities: No     No results found.    Objective   /78 (BP Location: Left arm, Patient Position: Sitting, Cuff Size: Large)   Pulse 61   Temp 97.9 °F (36.6 °C) (Tympanic)   Resp 16   Ht 5' 6\" (1.676 m)   Wt 78.7 kg (173 lb 6.4 oz)   SpO2 97%   BMI 27.99 kg/m²     Physical Exam  Vitals and nursing note reviewed.   Constitutional:       Appearance: Normal appearance. He is well-developed.   HENT:      Head: Normocephalic.      Right Ear: External ear normal.      Left Ear: External ear normal.      Nose: Nose normal.     Eyes:      Conjunctiva/sclera: Conjunctivae normal.      Pupils: Pupils are equal, round, and reactive to light.       Cardiovascular:      Rate and Rhythm: Normal rate and regular rhythm.      Heart sounds: Normal heart sounds.   Pulmonary:      Effort: Pulmonary effort is normal.      Breath sounds: Normal breath sounds.   Abdominal:      General: Bowel sounds are normal.      Palpations: Abdomen is soft.     Musculoskeletal:         General: Normal range of motion.      Cervical back: Normal range of motion and neck supple.     Skin:     General: Skin is warm and dry.     Neurological:      General: No focal deficit present.      Mental Status: He is alert and oriented to person, place, and time.     Psychiatric:         Behavior: Behavior normal.         Thought Content: Thought content normal.         Judgment: Judgment normal.         "

## 2025-06-03 NOTE — PATIENT INSTRUCTIONS
Medicare Preventive Visit Patient Instructions  Thank you for completing your Welcome to Medicare Visit or Medicare Annual Wellness Visit today. Your next wellness visit will be due in one year (6/4/2026).  The screening/preventive services that you may require over the next 5-10 years are detailed below. Some tests may not apply to you based off risk factors and/or age. Screening tests ordered at today's visit but not completed yet may show as past due. Also, please note that scanned in results may not display below.  Preventive Screenings:  Service Recommendations Previous Testing/Comments   Colorectal Cancer Screening  Colonoscopy    Fecal Occult Blood Test (FOBT)/Fecal Immunochemical Test (FIT)  Fecal DNA/Cologuard Test  Flexible Sigmoidoscopy Age: 45-75 years old   Colonoscopy: every 10 years (May be performed more frequently if at higher risk)  OR  FOBT/FIT: every 1 year  OR  Cologuard: every 3 years  OR  Sigmoidoscopy: every 5 years  Screening may be recommended earlier than age 45 if at higher risk for colorectal cancer. Also, an individualized decision between you and your healthcare provider will decide whether screening between the ages of 76-85 would be appropriate. Colonoscopy: 04/07/2009  FOBT/FIT: Not on file  Cologuard: Not on file  Sigmoidoscopy: Not on file          Prostate Cancer Screening Individualized decision between patient and health care provider in men between ages of 55-69   Medicare will cover every 12 months beginning on the day after your 50th birthday PSA: No results in last 5 years           Hepatitis C Screening Once for adults born between 1945 and 1965  More frequently in patients at high risk for Hepatitis C Hep C Antibody: Not on file        Diabetes Screening 1-2 times per year if you're at risk for diabetes or have pre-diabetes Fasting glucose: No results in last 5 years (No results in last 5 years)  A1C: No results in last 5 years (No results in last 5 years)       Cholesterol Screening Once every 5 years if you don't have a lipid disorder. May order more often based on risk factors. Lipid panel: Not on file         Other Preventive Screenings Covered by Medicare:  Abdominal Aortic Aneurysm (AAA) Screening: covered once if your at risk. You're considered to be at risk if you have a family history of AAA or a male between the age of 65-75 who smoking at least 100 cigarettes in your lifetime.  Lung Cancer Screening: covers low dose CT scan once per year if you meet all of the following conditions: (1) Age 55-77; (2) No signs or symptoms of lung cancer; (3) Current smoker or have quit smoking within the last 15 years; (4) You have a tobacco smoking history of at least 20 pack years (packs per day x number of years you smoked); (5) You get a written order from a healthcare provider.  Glaucoma Screening: covered annually if you're considered high risk: (1) You have diabetes OR (2) Family history of glaucoma OR (3)  aged 50 and older OR (4)  American aged 65 and older  Osteoporosis Screening: covered every 2 years if you meet one of the following conditions: (1) Have a vertebral abnormality; (2) On glucocorticoid therapy for more than 3 months; (3) Have primary hyperparathyroidism; (4) On osteoporosis medications and need to assess response to drug therapy.  HIV Screening: covered annually if you're between the age of 15-65. Also covered annually if you are younger than 15 and older than 65 with risk factors for HIV infection. For pregnant patients, it is covered up to 3 times per pregnancy.    Immunizations:  Immunization Recommendations   Influenza Vaccine Annual influenza vaccination during flu season is recommended for all persons aged >= 6 months who do not have contraindications   Pneumococcal Vaccine   * Pneumococcal conjugate vaccine = PCV13 (Prevnar 13), PCV15 (Vaxneuvance), PCV20 (Prevnar 20)  * Pneumococcal polysaccharide vaccine = PPSV23  (Pneumovax) Adults 19-63 yo with certain risk factors or if 65+ yo  If never received any pneumonia vaccine: recommend Prevnar 20 (PCV20)  Give PCV20 if previously received 1 dose of PCV13 or PPSV23   Hepatitis B Vaccine 3 dose series if at intermediate or high risk (ex: diabetes, end stage renal disease, liver disease)   Respiratory syncytial virus (RSV) Vaccine - COVERED BY MEDICARE PART D  * RSVPreF3 (Arexvy) CDC recommends that adults 60 years of age and older may receive a single dose of RSV vaccine using shared clinical decision-making (SCDM)   Tetanus (Td) Vaccine - COST NOT COVERED BY MEDICARE PART B Following completion of primary series, a booster dose should be given every 10 years to maintain immunity against tetanus. Td may also be given as tetanus wound prophylaxis.   Tdap Vaccine - COST NOT COVERED BY MEDICARE PART B Recommended at least once for all adults. For pregnant patients, recommended with each pregnancy.   Shingles Vaccine (Shingrix) - COST NOT COVERED BY MEDICARE PART B  2 shot series recommended in those 19 years and older who have or will have weakened immune systems or those 50 years and older     Health Maintenance Due:  There are no preventive care reminders to display for this patient.  Immunizations Due:      Topic Date Due   • Pneumococcal Vaccine: 65+ Years (2 of 2 - PCV) 05/06/2012   • COVID-19 Vaccine (5 - 2024-25 season) 09/01/2024     Advance Directives   What are advance directives?  Advance directives are legal documents that state your wishes and plans for medical care. These plans are made ahead of time in case you lose your ability to make decisions for yourself. Advance directives can apply to any medical decision, such as the treatments you want, and if you want to donate organs.   What are the types of advance directives?  There are many types of advance directives, and each state has rules about how to use them. You may choose a combination of any of the  following:  Living will:  This is a written record of the treatment you want. You can also choose which treatments you do not want, which to limit, and which to stop at a certain time. This includes surgery, medicine, IV fluid, and tube feedings.   Durable power of  for healthcare (DPAHC):  This is a written record that states who you want to make healthcare choices for you when you are unable to make them for yourself. This person, called a proxy, is usually a family member or a friend. You may choose more than 1 proxy.  Do not resuscitate (DNR) order:  A DNR order is used in case your heart stops beating or you stop breathing. It is a request not to have certain forms of treatment, such as CPR. A DNR order may be included in other types of advance directives.  Medical directive:  This covers the care that you want if you are in a coma, near death, or unable to make decisions for yourself. You can list the treatments you want for each condition. Treatment may include pain medicine, surgery, blood transfusions, dialysis, IV or tube feedings, and a ventilator (breathing machine).  Values history:  This document has questions about your views, beliefs, and how you feel and think about life. This information can help others choose the care that you would choose.  Why are advance directives important?  An advance directive helps you control your care. Although spoken wishes may be used, it is better to have your wishes written down. Spoken wishes can be misunderstood, or not followed. Treatments may be given even if you do not want them. An advance directive may make it easier for your family to make difficult choices about your care.   Weight Management   Why it is important to manage your weight:  Being overweight increases your risk of health conditions such as heart disease, high blood pressure, type 2 diabetes, and certain types of cancer. It can also increase your risk for osteoarthritis, sleep apnea, and  other respiratory problems. Aim for a slow, steady weight loss. Even a small amount of weight loss can lower your risk of health problems.  How to lose weight safely:  A safe and healthy way to lose weight is to eat fewer calories and get regular exercise. You can lose up about 1 pound a week by decreasing the number of calories you eat by 500 calories each day.   Healthy meal plan for weight management:  A healthy meal plan includes a variety of foods, contains fewer calories, and helps you stay healthy. A healthy meal plan includes the following:  Eat whole-grain foods more often.  A healthy meal plan should contain fiber. Fiber is the part of grains, fruits, and vegetables that is not broken down by your body. Whole-grain foods are healthy and provide extra fiber in your diet. Some examples of whole-grain foods are whole-wheat breads and pastas, oatmeal, brown rice, and bulgur.  Eat a variety of vegetables every day.  Include dark, leafy greens such as spinach, kale, raul greens, and mustard greens. Eat yellow and orange vegetables such as carrots, sweet potatoes, and winter squash.   Eat a variety of fruits every day.  Choose fresh or canned fruit (canned in its own juice or light syrup) instead of juice. Fruit juice has very little or no fiber.  Eat low-fat dairy foods.  Drink fat-free (skim) milk or 1% milk. Eat fat-free yogurt and low-fat cottage cheese. Try low-fat cheeses such as mozzarella and other reduced-fat cheeses.  Choose meat and other protein foods that are low in fat.  Choose beans or other legumes such as split peas or lentils. Choose fish, skinless poultry (chicken or turkey), or lean cuts of red meat (beef or pork). Before you cook meat or poultry, cut off any visible fat.   Use less fat and oil.  Try baking foods instead of frying them. Add less fat, such as margarine, sour cream, regular salad dressing and mayonnaise to foods. Eat fewer high-fat foods. Some examples of high-fat foods  include french fries, doughnuts, ice cream, and cakes.  Eat fewer sweets.  Limit foods and drinks that are high in sugar. This includes candy, cookies, regular soda, and sweetened drinks.  Exercise:  Exercise at least 30 minutes per day on most days of the week. Some examples of exercise include walking, biking, dancing, and swimming. You can also fit in more physical activity by taking the stairs instead of the elevator or parking farther away from stores. Ask your healthcare provider about the best exercise plan for you.    © Copyright [employer] 2018 Information is for End User's use only and may not be sold, redistributed or otherwise used for commercial purposes. All illustrations and images included in CareNotes® are the copyrighted property of [employer] or [employer]